# Patient Record
Sex: MALE | Race: BLACK OR AFRICAN AMERICAN | NOT HISPANIC OR LATINO | Employment: FULL TIME | URBAN - METROPOLITAN AREA
[De-identification: names, ages, dates, MRNs, and addresses within clinical notes are randomized per-mention and may not be internally consistent; named-entity substitution may affect disease eponyms.]

---

## 2017-04-03 ENCOUNTER — OFFICE VISIT (OUTPATIENT)
Dept: ORTHOPEDICS | Facility: CLINIC | Age: 47
End: 2017-04-03
Payer: COMMERCIAL

## 2017-04-03 VITALS — HEIGHT: 72 IN | BODY MASS INDEX: 34.54 KG/M2 | WEIGHT: 255 LBS

## 2017-04-03 DIAGNOSIS — S46.102A INJURY OF TENDON OF LONG HEAD OF LEFT BICEPS, INITIAL ENCOUNTER: ICD-10-CM

## 2017-04-03 DIAGNOSIS — S46.212A RUPTURE OF LEFT DISTAL BICEPS TENDON, INITIAL ENCOUNTER: ICD-10-CM

## 2017-04-03 DIAGNOSIS — S46.102A INJURY OF TENDON OF LONG HEAD OF BICEPS, LEFT, INITIAL ENCOUNTER: Primary | ICD-10-CM

## 2017-04-03 PROCEDURE — 99203 OFFICE O/P NEW LOW 30 MIN: CPT | Mod: S$GLB,,, | Performed by: ORTHOPAEDIC SURGERY

## 2017-04-03 PROCEDURE — 1160F RVW MEDS BY RX/DR IN RCRD: CPT | Mod: S$GLB,,, | Performed by: ORTHOPAEDIC SURGERY

## 2017-04-03 PROCEDURE — 99999 PR PBB SHADOW E&M-EST. PATIENT-LVL II: CPT | Mod: PBBFAC,,, | Performed by: ORTHOPAEDIC SURGERY

## 2017-04-03 NOTE — LETTER
April 3, 2017        Shane Mayorga Jr., MD  [address] [facility] [address]             [facility]  [address] [place] [address] [address]  [facility] [address]  Phone: [phone]   Patient: Esa Yang   MR Number: [id number]   YOB: 1970   Date of Visit: 4/3/2017       Dear Dr. Mayorga:    Thank you for referring Esa Yang to me for evaluation. Below are the relevant portions of my assessment and plan of care.            If you have questions, please do not hesitate to call me. I look forward to following Esa along with you.    Sincerely,      Montana Russ Jr., MD           CC  No Recipients

## 2017-04-03 NOTE — MR AVS SNAPSHOT
[facility]  [address] [place] [address] [address]  [place] [address]  Phone: [phone]                  Esa Yang   4/3/2017 9:00 AM   Office Visit    Description:  Male : 1970   Provider:  Montana Russ Jr., MD   Department:  [place] [facility]           Reason for Visit     Left Upper Arm - Pain, Injury           Diagnoses this Visit        Comments    Injury of tendon of long head of biceps, left, initial encounter    -  Primary     Injury of tendon of long head of left biceps, initial encounter         Rupture of left distal biceps tendon, initial encounter                To Do List           Future Appointments        Provider Department Dept Phone    2017 6:00 PM [facility] MRI1 [facility] [phone]    4/10/2017 10:00 AM Montana Russ Jr., MD [facility] [phone]      Goals (5 Years of Data)     None      [facility]     [facility] Nurse Care Line -  Assistance  Unless otherwise directed by your provider, please contact [facility], our nurse care line that is available for  assistance.     Registered nurses in the [facility] provide: appointment scheduling, clinical advisement, health education, and other advisory services.  Call: [phone] (toll free)               Medications           Message regarding Medications     Verify the changes and/or additions to your medication regime listed below are the same as discussed with your clinician today.  If any of these changes or additions are incorrect, please notify your healthcare provider.             Verify that the below list of medications is an accurate representation of the medications you are currently taking.  If none reported, the list may be blank. If incorrect, please contact your healthcare provider. Carry this list with you in case of emergency.           Current Medications     AXIRON 30 mg/actuation (1.5 mL) SlPm     cetirizine (ZYRTEC) 10  MG tablet Take 10 mg by mouth once daily.    ibuprofen (ADVIL,MOTRIN) 600 MG tablet Take 1 tablet (600 mg total) by mouth every 6 (six) hours as needed for Pain.    ranitidine (ZANTAC) 150 MG capsule Take 150 mg by mouth 2 (two) times daily.           Clinical Reference Information           Your Vitals Were     Height Weight BMI          6' (1.829 m) 115.7 kg (255 lb) 34.58 kg/m2        Allergies as of 4/3/2017     Sulfa (Sulfonamide Antibiotics)    Neosporin (Neomycin-polymyx)    Aspirin    Shellfish Containing Products      Immunizations Administered on Date of Encounter - 4/3/2017     None      Orders Placed During Today's Visit     Future Labs/Procedures Expected by Expires    MRI Upper Extremity Joint WO Cont Left  4/3/2017 4/3/2018      [facility] Sign-Up     Activating your [facility] account is as easy as 1-2-3!     1) Visit [email or web address], select Sign Up Now, enter this activation code and your date of birth, then select Next.  [email or web address]  Expires: 5/18/2017  9:29 AM      2) Create a username and password to use when you visit [facility] in the future and select a security question in case you lose your password and select Next.    3) Enter your e-mail address and click Sign Up!    Additional Information  If you have questions, please e-mail [email or web address] or call [phone] to talk to our [facility] staff. Remember, [facility] is NOT to be used for urgent needs. For medical emergencies, dial 911.         Language Assistance Services     ATTENTION: Language assistance services are available, free of charge. Please call [phone].      ATENCIÓN: Si habla español, tiene a glover disposición servicios gratuitos de asistencia lingüística. Llame al [phone].     CHÚ Ý: N?u b?n nói Ti?ng Vi?t, có các d?ch v? h? tr? ngôn ng? mi?n phí dành cho b?n. G?i s? [phone].         [facility] complies with applicable Federal civil rights laws and does not discriminate on the basis of race,  color, national origin, age, disability, or sex.

## 2017-04-03 NOTE — PROGRESS NOTES
INITIAL VISIT HISTORY:  A 47-year-old male sustained injury to his left arm when   he fell off a ladder, two days ago; seen at Urgent Care, and noted to have a   possible biceps rupture of the left arm.  He has had a lot of bruising and   swelling over the weekend.  He has been using ice.  Some pain reported, but   weakness mainly, left arm.    PAST MEDICAL HISTORY:  Significant for back pain and GERD.    PAST SURGICAL HISTORY:  Includes ankle surgery, knee surgery, and back surgery   recently.    FAMILY HISTORY:  Negative.    SOCIAL HISTORY:  The patient does not smoke.  Drinks occasionally.    REVIEW OF SYSTEMS:  Negative fever, chills, rashes.    CURRENT MEDICATIONS:  Reviewed on chart.    ALLERGIES:  Sulfa, Neosporin, aspirin, and shellfish.    PHYSICAL EXAMINATION:  GENERAL:  Well-developed, well-nourished, muscular male in no acute distress,   alert, oriented x3.  HEENT:  Unremarkable.  LUNGS:  Clear to auscultation.  HEART:  Regular rate and rhythm.  ABDOMEN:  Soft, nontender.  EXTREMITIES:  Significant for the left arm, demonstrating bruising and swelling   to left elbow anteriorly and medially.  He has full range of motion of elbow,   with some weakness of the biceps.  The biceps tendon itself is not really   palpable, but I do not feel anything ruptured either.    IMAGING:  No x-rays.    IMPRESSION:  Possible biceps tendon rupture, distal left elbow.    PLAN:  I explained the nature of the injury to the patient.  I think we do need   to get an MRI as soon as possible to check the biceps tendon.  We will set this   up this week.  In the meantime, continue ice, light activities, no heavy   lifting, and follow up in one week for recheck.      MARIELLA  dd: 04/03/2017 09:29:35 (CDT)  td: 04/03/2017 23:56:36 (CDT)  Doc ID   #[id number]  Job ID #[id number]    CC:

## 2017-04-05 ENCOUNTER — HOSPITAL ENCOUNTER (OUTPATIENT)
Dept: RADIOLOGY | Facility: HOSPITAL | Age: 47
Discharge: HOME | End: 2017-04-05
Attending: ORTHOPAEDIC SURGERY
Payer: COMMERCIAL

## 2017-04-05 DIAGNOSIS — S46.102A INJURY OF TENDON OF LONG HEAD OF BICEPS, LEFT, INITIAL ENCOUNTER: ICD-10-CM

## 2017-04-05 PROCEDURE — 73221 MRI JOINT UPR EXTREM W/O DYE: CPT | Mod: 26,LT,, | Performed by: RADIOLOGY

## 2017-04-05 PROCEDURE — 73221 MRI JOINT UPR EXTREM W/O DYE: CPT | Mod: TC,LT

## 2017-04-10 ENCOUNTER — OFFICE VISIT (OUTPATIENT)
Dept: ORTHOPEDICS | Facility: CLINIC | Age: 47
End: 2017-04-10
Payer: COMMERCIAL

## 2017-04-10 ENCOUNTER — TELEPHONE (OUTPATIENT)
Dept: ORTHOPEDICS | Facility: CLINIC | Age: 47
End: 2017-04-10

## 2017-04-10 DIAGNOSIS — S46.212D RUPTURE OF LEFT DISTAL BICEPS TENDON, SUBSEQUENT ENCOUNTER: Primary | ICD-10-CM

## 2017-04-10 PROCEDURE — 99214 OFFICE O/P EST MOD 30 MIN: CPT | Mod: 57,S$GLB,, | Performed by: ORTHOPAEDIC SURGERY

## 2017-04-10 PROCEDURE — 99999 PR PBB SHADOW E&M-EST. PATIENT-LVL III: CPT | Mod: PBBFAC,,, | Performed by: ORTHOPAEDIC SURGERY

## 2017-04-10 PROCEDURE — 1160F RVW MEDS BY RX/DR IN RCRD: CPT | Mod: S$GLB,,, | Performed by: ORTHOPAEDIC SURGERY

## 2017-04-10 NOTE — TELEPHONE ENCOUNTER
I spoke with the patient and informed him that we needed him at the hospital for 9:30am on Wednesday. He understood.

## 2017-04-10 NOTE — PROGRESS NOTES
CHIEF COMPLAINT:  Biceps tendon rupture, left elbow.    HISTORY OF PRESENT ILLNESS:  A 47-year-old male sustained injury to his left   elbow a week ago, injured his left arm.  Recent MRI shows a complete tear of the   distal biceps tendon, left elbow with some retraction.  I explained the nature   of the injury to the patient, recommended and set up surgery for later this week   at [facility].    PAST MEDICAL HISTORY:  Significant for back pain and GERD.    PAST SURGICAL HISTORY:  Includes knee surgery and ankle surgery.    FAMILY HISTORY:  Unremarkable.    SOCIAL HISTORY:  The patient does not smoke.  Drinks occasionally.    REVIEW OF SYSTEMS:  Negative fever, chills, rashes.    CURRENT MEDICATIONS:  Reviewed on the chart.    ALLERGIES:  Sulfa, Neosporin, aspirin and shellfish.    PHYSICAL EXAMINATION:  GENERAL:  Well-developed, muscular male in no acute distress, alert and oriented   x3.  HEENT:  Unremarkable.  LUNGS:  Clear to auscultation.  HEART:  Regular rate and rhythm.  ABDOMEN:  Soft, nontender.  EXTREMITIES:  Significant for the left elbow demonstrating tenderness   anteriorly, mild swelling.  Range of motion full.  Weakness, biceps.  NEUROLOGIC:  Intact.    MRI demonstrates complete tear of the biceps, distal.    IMPRESSION:  Distal biceps tendon tear, left elbow.    PLAN:  We will set up surgery for later this week for biceps repair to bone.    Risks and benefits of surgery explained to the patient as well as the   possibility of nerve injury, he understands.      MARIELLA  dd: 04/10/2017 10:33:31 (CDT)  td: 04/11/2017 08:11:09 (CDT)  Doc ID   #[id number]  Job ID #[id number]    CC:

## 2017-04-11 ENCOUNTER — TELEPHONE (OUTPATIENT)
Dept: ORTHOPEDICS | Facility: CLINIC | Age: 47
End: 2017-04-11

## 2017-04-11 ENCOUNTER — HOSPITAL ENCOUNTER (OUTPATIENT)
Dept: PREADMISSION TESTING | Facility: OTHER | Age: 47
Discharge: HOME | End: 2017-04-11
Attending: ORTHOPAEDIC SURGERY
Payer: COMMERCIAL

## 2017-04-11 ENCOUNTER — ANESTHESIA EVENT (OUTPATIENT)
Dept: SURGERY | Facility: OTHER | Age: 47
End: 2017-04-11
Payer: COMMERCIAL

## 2017-04-11 VITALS
HEIGHT: 72 IN | DIASTOLIC BLOOD PRESSURE: 88 MMHG | BODY MASS INDEX: 34.54 KG/M2 | OXYGEN SATURATION: 100 % | WEIGHT: 255 LBS | SYSTOLIC BLOOD PRESSURE: 139 MMHG | HEART RATE: 72 BPM | TEMPERATURE: 98 F

## 2017-04-11 RX ORDER — MIDAZOLAM HYDROCHLORIDE 5 MG/ML
5 INJECTION INTRAMUSCULAR; INTRAVENOUS
Status: DISPENSED | OUTPATIENT
Start: 2017-04-11 | End: 2017-04-11

## 2017-04-11 RX ORDER — PREGABALIN 75 MG/1
150 CAPSULE ORAL
Status: DISPENSED | OUTPATIENT
Start: 2017-04-11 | End: 2017-04-11

## 2017-04-11 RX ORDER — SODIUM CHLORIDE, SODIUM LACTATE, POTASSIUM CHLORIDE, CALCIUM CHLORIDE 600; 310; 30; 20 MG/100ML; MG/100ML; MG/100ML; MG/100ML
INJECTION, SOLUTION INTRAVENOUS CONTINUOUS
Status: CANCELLED | OUTPATIENT
Start: 2017-04-11

## 2017-04-11 RX ORDER — OMEPRAZOLE 40 MG/1
40 CAPSULE, DELAYED RELEASE ORAL DAILY
COMMUNITY
End: 2021-10-18

## 2017-04-11 RX ORDER — FAMOTIDINE 20 MG/1
20 TABLET, FILM COATED ORAL
Status: CANCELLED | OUTPATIENT
Start: 2017-04-11 | End: 2017-04-11

## 2017-04-11 NOTE — DISCHARGE INSTRUCTIONS
PRE-ADMIT TESTING -  [phone]    [address]  [facility]        OUTPATIENT SURGERY UNIT - [phone]    Your surgery has been scheduled at [facility]. We are pleased to have the opportunity to serve you. For Further Information please call [phone].    On the day of surgery please report to the Information Desk on the 1st floor.    CONTACT YOUR PHYSICIAN'S OFFICE THE DAY PRIOR TO YOUR SURGERY TO OBTAIN YOUR ARRIVAL TIME.     The evening before surgery do not eat anything after 9 p.m. ( this includes hard candy, chewing gum and mints).  You may have GATORADE, POWERADE AND WATER FROM 9 p.m. until leaving home to come to the hospital.   DO NOT DRINK ANY LIQUIDS ON THE WAY TO THE HOSPITAL.     SPECIAL MEDICATION INSTRUCTIONS: TAKE medications checked off by the Anesthesiologist on your Medication List.    Angiogram Patients: Take medications as instructed by your physician, including aspirin.     Surgery Patients:    If you take ASPIRIN - Your PHYSICIAN/SURGEON will need to inform you IF/OR when you need to stop taking aspirin prior to your surgery.     Do Not take any medications containing IBUPROFEN.  Do Not Wear any make-up or dark nail polish   (especially eye make-up) to surgery. If you come to surgery with makeup on you will be required to remove the makeup or nail polish.    Do not shave your surgical area at least 5 days prior to your surgery. The surgical prep will be performed at the hospital according to Infection Control regulations.    Leave all valuables at home.   Do Not wear any jewelry or watches, including any metal in body piercings.  Contact Lens must be removed before surgery. Either do not wear the contact lens or bring a case and solution for storage.  Please bring a container for eyeglasses or dentures as required.  Bring any paperwork your physician has provided, such as consent forms,  history and physicals, doctor's orders, etc.   Bring comfortable  clothes that are loose fitting to wear upon discharge. Take into consideration the type of surgery being performed.  Maintain your diet as advised per your physician the day prior to surgery.      Adequate rest the night before surgery is advised.   Park in the Parking lot behind the hospital or in the [facility] across the street from the parking lot. Parking is complimentary.  If you will be discharged the same day as your procedure, please arrange for a responsible adult to drive you home or to accompany you if traveling by taxi.   YOU WILL NOT BE PERMITTED TO DRIVE OR TO LEAVE THE HOSPITAL ALONE AFTER SURGERY.   It is strongly recommended that you arrange for someone to remain with you for the first 24 hrs following your surgery.       Thank you for your cooperation.  The Staff of [facility].        Bathing Instructions                                                                 Please shower the evening before and morning of your procedure with    ANTIBACTERIAL SOAP. ( DIAL, etc )  Concentrate on the surgical area   for at least 3 minutes and rinse completely. Dry off as usual.   Do not use any deodorant, powder, body lotions, perfume, after shave or    cologne.

## 2017-04-11 NOTE — IP AVS SNAPSHOT
[facility] Location ([facility])  [address] [place], [place] [address]  Phone: [phone]           Patient Discharge Instructions  Our goal is to set you up for success. This packet includes information on your condition, medications, and your home care. It will help you care for yourself to prevent having to return to the hospital.     Please ask your nurse if you have any questions.      There are many details to remember when preparing for your surgery. Here is what you will need to do, please ask your nurse if there are more specific instructions and if you have any questions:    1. Before procedure Do not smoke or drink alcoholic beverages 24 hours prior to your procedure. Do not eat or drink anything 8 hours before your procedure - this includes gum, mints, and candy.     2. Day of procedure Please remove all jewelry for the procedure. If you wear contact lenses, dentures, hearing aids or glasses, bring a container to put them in during your surgery and give to a family member.  If your doctor has scheduled you for an overnight stay, bring a small overnight bag with any personal items that you need.      3. After procedure  Make arrangements in advance for transportation home by a responsible adult. It is not safe to drive a vehicle during the 24 hours following surgery.     PLEASE NOTE: You may be contacted the day before your surgery to confirm your surgery date and arrival time. The Surgery schedule has many variables which may affect the time of your surgery case. Family members should be available if your surgery time changes.           [facility]  Unless otherwise directed by your provider, please   contact [facility], our nurse care line   that is available for 24/7 assistance.     [phone] (toll-free)     Registered nurses in the [facility]   provide: appointment scheduling, clinical advisement, health education, and other advisory services.                  **  Verify the list of medication(s) below is accurate and up to date. Carry this with you in case of emergency. If your medications have changed, please notify your healthcare provider.             Medication List      TAKE these medications        Additional Info                      omeprazole 40 MG capsule   Commonly known as:  PRILOSEC   Refills:  0   Dose:  40 mg    Instructions:  Take 40 mg by mouth once daily.     Begin Date    AM    Noon    PM    Bedtime                  Please bring to all follow up appointments:    1. A copy of your discharge instructions.  2. All medicines you are currently taking in their original bottles.  3. Identification and insurance card.    Please arrive 15 minutes ahead of scheduled appointment time.    Please call 24 hours in advance if you must reschedule your appointment and/or time.        Your Future Surgeries/Procedures     Apr 12, 2017   Surgery with Montana Russ Jr., MD   [facility] ([facility]    [address] [place] [address]   [phone]                  Discharge Instructions       PRE-ADMIT TESTING -  [phone]    [address] [place] [address]        OUTPATIENT SURGERY UNIT - [phone]    Your surgery has been scheduled at [facility]. We are pleased to have the opportunity to serve you. For Further Information please call [phone].    On the day of surgery please report to the Information Desk on the 1st floor.    CONTACT YOUR PHYSICIAN'S OFFICE THE DAY PRIOR TO YOUR SURGERY TO OBTAIN YOUR ARRIVAL TIME.     The evening before surgery do not eat anything after 9 p.m. ( this includes hard candy, chewing gum and mints).  You may have GATORADE, POWERADE AND WATER FROM 9 p.m. until leaving home to come to the hospital.   DO NOT DRINK ANY LIQUIDS ON THE WAY TO THE HOSPITAL.     SPECIAL MEDICATION INSTRUCTIONS: TAKE medications checked off by the Anesthesiologist on your Medication  List.    Angiogram Patients: Take medications as instructed by your physician, including aspirin.     Surgery Patients:    If you take ASPIRIN - Your PHYSICIAN/SURGEON will need to inform you IF/OR when you need to stop taking aspirin prior to your surgery.     Do Not take any medications containing IBUPROFEN.  Do Not Wear any make-up or dark nail polish   (especially eye make-up) to surgery. If you come to surgery with makeup on you will be required to remove the makeup or nail polish.    Do not shave your surgical area at least 5 days prior to your surgery. The surgical prep will be performed at the hospital according to Infection Control regulations.    Leave all valuables at home.   Do Not wear any jewelry or watches, including any metal in body piercings.  Contact Lens must be removed before surgery. Either do not wear the contact lens or bring a case and solution for storage.  Please bring a container for eyeglasses or dentures as required.  Bring any paperwork your physician has provided, such as consent forms,  history and physicals, doctor's orders, etc.   Bring comfortable clothes that are loose fitting to wear upon discharge. Take into consideration the type of surgery being performed.  Maintain your diet as advised per your physician the day prior to surgery.      Adequate rest the night before surgery is advised.   Park in the Parking lot behind the hospital or in the [place] [facility] across the street from the parking lot. Parking is complimentary.  If you will be discharged the same day as your procedure, please arrange for a responsible adult to drive you home or to accompany you if traveling by taxi.   YOU WILL NOT BE PERMITTED TO DRIVE OR TO LEAVE THE HOSPITAL ALONE AFTER SURGERY.   It is strongly recommended that you arrange for someone to remain with you for the first 24 hrs following your surgery.       Thank you for your cooperation.  The Staff of [facility].        Bathing Instructions                                                                 Please shower the evening before and morning of your procedure with    ANTIBACTERIAL SOAP. ( DIAL, etc )  Concentrate on the surgical area   for at least 3 minutes and rinse completely. Dry off as usual.   Do not use any deodorant, powder, body lotions, perfume, after shave or    cologne.                                                Admission Information     Date & Time Provider Department CSN    4/11/2017  8:00 AM Montana Russ Jr., MD [facility] [id number]      Care Providers     Provider Role Specialty Primary office phone    Montana Russ Jr., MD Attending Provider Hand Surgery [phone]      Your Vitals Were     BP Pulse Temp Height Weight SpO2    139/88 72 98.2 °F (36.8 °C) (Oral) 6' (1.829 m) 115.7 kg (255 lb) 100%    BMI                34.58 kg/m2          Recent Lab Values        11/14/2014                           9:00 AM           A1C 6.2                       Allergies as of 4/11/2017        Reactions    Sulfa (Sulfonamide Antibiotics) Swelling    Neosporin (Neomycin-polymyx) Rash    Topical irritation     Aspirin Swelling    Shellfish Containing Products Swelling      Advance Directives     An advance directive is a document which, in the event you are no longer able to make decisions for yourself, tells your healthcare team what kind of treatment you do or do not want to receive, or who you would like to make those decisions for you.  If you do not currently have an advance directive, [facility] encourages you to create one.  For more information call:  [phone]), [phone] ([phone]),  or log on to [email or web address].        Language Assistance Services     ATTENTION: Language assistance services are available, free of charge. Please call [phone].      ATENCIÓN: Si habla español, tiene a glover disposición servicios gratuitos de asistencia  apula. Cindy dillon [phone].     PAUL Ý: N?u b?n nói Ti?ng Vi?t, có các d?ch v? h? tr? ngôn ng? mi?n phí dành cho b?n. G?i s? [phone].        [facility] Sign-Up     Activating your [facility] account is as easy as 1-2-3!     1) Visit [email or web address], select Sign Up Now, enter this activation code and your date of birth, then select Next.  [email or web address]  Expires: 5/18/2017  9:29 AM      2) Create a username and password to use when you visit [facility] in the future and select a security question in case you lose your password and select Next.    3) Enter your e-mail address and click Sign Up!    Additional Information  If you have questions, please e-mail [email or web address] or call [phone] to talk to our [facility] staff. Remember, [facility] is NOT to be used for urgent needs. For medical emergencies, dial 911.          [facility] complies with applicable Federal civil rights laws and does not discriminate on the basis of race, color, national origin, age, disability, or sex.

## 2017-04-11 NOTE — ANESTHESIA PREPROCEDURE EVALUATION
04/11/2017  Esa Yang is a 47 y.o., male.    [facility] Anesthesia Evaluation    I have reviewed the Patient Summary Reports.    I have reviewed the Nursing Notes.   I have reviewed the Medications.     Review of Systems  Anesthesia Hx:  No problems with previous Anesthesia    Social:  Non-Smoker, No Alcohol Use    Hematology/Oncology:  Hematology Normal   Oncology Normal     Cardiovascular:  Cardiovascular Normal     Pulmonary:  Pulmonary Normal    Renal/:  Renal/ Normal     Hepatic/GI:   GERD, well controlled    Musculoskeletal:   Discectomies L3 and 5 with hardware.   Doing well with good pain relief Spine Disorders: lumbar Disc disease    Neurological:  Neurology Normal    Endocrine:  Endocrine Normal        Physical Exam  General:  Obesity    Airway/Jaw/Neck:  Airway Findings: Mouth Opening: Normal Tongue: Normal  General Airway Assessment: Adult, Average  Mallampati: II  TM Distance: 4 - 6 cm  Jaw/Neck Findings:  Neck ROM: Normal ROM      Dental:  Dental Findings: In tact, upper front caps             Anesthesia Plan  Type of Anesthesia, risks & benefits discussed:  Anesthesia Type:  general  Patient's Preference:   Intra-op Monitoring Plan:   Intra-op Monitoring Plan Comments:   Post Op Pain Control Plan:   Post Op Pain Control Plan Comments:   Induction:   IV  Beta Blocker:         Informed Consent: Patient understands risks and agrees with Anesthesia plan.  Questions answered. Anesthesia consent signed with patient.  ASA Score: 2     Day of Surgery Review of History & Physical:    H&P update referred to the surgeon.         Ready For Surgery From Anesthesia Perspective.

## 2017-04-12 ENCOUNTER — SURGERY (OUTPATIENT)
Age: 47
End: 2017-04-12

## 2017-04-12 ENCOUNTER — ANESTHESIA (OUTPATIENT)
Dept: SURGERY | Facility: OTHER | Age: 47
End: 2017-04-12
Payer: COMMERCIAL

## 2017-04-12 ENCOUNTER — HOSPITAL ENCOUNTER (OUTPATIENT)
Facility: OTHER | Age: 47
Discharge: HOME | End: 2017-04-12
Attending: ORTHOPAEDIC SURGERY | Admitting: ORTHOPAEDIC SURGERY
Payer: COMMERCIAL

## 2017-04-12 VITALS
DIASTOLIC BLOOD PRESSURE: 97 MMHG | HEIGHT: 72 IN | OXYGEN SATURATION: 100 % | RESPIRATION RATE: 18 BRPM | HEART RATE: 75 BPM | TEMPERATURE: 98 F | SYSTOLIC BLOOD PRESSURE: 164 MMHG | BODY MASS INDEX: 34.54 KG/M2 | WEIGHT: 255 LBS

## 2017-04-12 DIAGNOSIS — S46.212D RUPTURE OF LEFT DISTAL BICEPS TENDON, SUBSEQUENT ENCOUNTER: ICD-10-CM

## 2017-04-12 PROCEDURE — 63600175 PHARM REV CODE 636 W HCPCS: Performed by: SPECIALIST

## 2017-04-12 PROCEDURE — 63600175 PHARM REV CODE 636 W HCPCS: Performed by: ORTHOPAEDIC SURGERY

## 2017-04-12 PROCEDURE — 71000033 HC RECOVERY, INTIAL HOUR: Performed by: ORTHOPAEDIC SURGERY

## 2017-04-12 PROCEDURE — 27201423 OPTIME MED/SURG SUP & DEVICES STERILE SUPPLY: Performed by: ORTHOPAEDIC SURGERY

## 2017-04-12 PROCEDURE — 37000008 HC ANESTHESIA 1ST 15 MINUTES: Performed by: ORTHOPAEDIC SURGERY

## 2017-04-12 PROCEDURE — 25000003 PHARM REV CODE 250: Performed by: ANESTHESIOLOGY

## 2017-04-12 PROCEDURE — 36000708 HC OR TIME LEV III 1ST 15 MIN: Performed by: ORTHOPAEDIC SURGERY

## 2017-04-12 PROCEDURE — 24340 TENODESIS BICEPS TDN AT ELBW: CPT | Mod: LT,,, | Performed by: ORTHOPAEDIC SURGERY

## 2017-04-12 PROCEDURE — 25000242 PHARM REV CODE 250 ALT 637 W/ HCPCS: Performed by: NURSE ANESTHETIST, CERTIFIED REGISTERED

## 2017-04-12 PROCEDURE — 25000003 PHARM REV CODE 250: Performed by: ORTHOPAEDIC SURGERY

## 2017-04-12 PROCEDURE — 71000015 HC POSTOP RECOV 1ST HR: Performed by: ORTHOPAEDIC SURGERY

## 2017-04-12 PROCEDURE — 71000016 HC POSTOP RECOV ADDL HR: Performed by: ORTHOPAEDIC SURGERY

## 2017-04-12 PROCEDURE — 63600175 PHARM REV CODE 636 W HCPCS: Performed by: NURSE ANESTHETIST, CERTIFIED REGISTERED

## 2017-04-12 PROCEDURE — 25000003 PHARM REV CODE 250: Performed by: SPECIALIST

## 2017-04-12 PROCEDURE — 36000709 HC OR TIME LEV III EA ADD 15 MIN: Performed by: ORTHOPAEDIC SURGERY

## 2017-04-12 PROCEDURE — 71000039 HC RECOVERY, EACH ADD'L HOUR: Performed by: ORTHOPAEDIC SURGERY

## 2017-04-12 PROCEDURE — C1713 ANCHOR/SCREW BN/BN,TIS/BN: HCPCS | Performed by: ORTHOPAEDIC SURGERY

## 2017-04-12 PROCEDURE — 25000003 PHARM REV CODE 250: Performed by: NURSE ANESTHETIST, CERTIFIED REGISTERED

## 2017-04-12 PROCEDURE — 37000009 HC ANESTHESIA EA ADD 15 MINS: Performed by: ORTHOPAEDIC SURGERY

## 2017-04-12 DEVICE — BUTTON BICEPS DISTAL STERILE: Type: IMPLANTABLE DEVICE | Site: ARM | Status: FUNCTIONAL

## 2017-04-12 RX ORDER — DIPHENHYDRAMINE HYDROCHLORIDE 50 MG/ML
25 INJECTION INTRAMUSCULAR; INTRAVENOUS EVERY 6 HOURS PRN
Status: DISCONTINUED | OUTPATIENT
Start: 2017-04-12 | End: 2017-04-12 | Stop reason: HOSPADM

## 2017-04-12 RX ORDER — OXYCODONE AND ACETAMINOPHEN 10; 325 MG/1; MG/1
1 TABLET ORAL EVERY 4 HOURS PRN
Qty: 40 TABLET | Refills: 0 | Status: SHIPPED | OUTPATIENT
Start: 2017-04-12 | End: 2017-04-24

## 2017-04-12 RX ORDER — FAMOTIDINE 20 MG/1
20 TABLET, FILM COATED ORAL
Status: COMPLETED | OUTPATIENT
Start: 2017-04-12 | End: 2017-04-12

## 2017-04-12 RX ORDER — PROPOFOL 10 MG/ML
VIAL (ML) INTRAVENOUS
Status: DISCONTINUED | OUTPATIENT
Start: 2017-04-12 | End: 2017-04-12

## 2017-04-12 RX ORDER — OXYCODONE HYDROCHLORIDE 5 MG/1
5 TABLET ORAL
Status: DISCONTINUED | OUTPATIENT
Start: 2017-04-12 | End: 2017-04-12 | Stop reason: HOSPADM

## 2017-04-12 RX ORDER — BUPIVACAINE HYDROCHLORIDE 2.5 MG/ML
INJECTION, SOLUTION EPIDURAL; INFILTRATION; INTRACAUDAL
Status: DISCONTINUED | OUTPATIENT
Start: 2017-04-12 | End: 2017-04-12 | Stop reason: HOSPADM

## 2017-04-12 RX ORDER — HYDRALAZINE HYDROCHLORIDE 20 MG/ML
10 INJECTION INTRAMUSCULAR; INTRAVENOUS ONCE
Status: COMPLETED | OUTPATIENT
Start: 2017-04-12 | End: 2017-04-12

## 2017-04-12 RX ORDER — ONDANSETRON 2 MG/ML
4 INJECTION INTRAMUSCULAR; INTRAVENOUS ONCE AS NEEDED
Status: COMPLETED | OUTPATIENT
Start: 2017-04-12 | End: 2017-04-12

## 2017-04-12 RX ORDER — ONDANSETRON 8 MG/1
8 TABLET, ORALLY DISINTEGRATING ORAL EVERY 8 HOURS PRN
Status: DISCONTINUED | OUTPATIENT
Start: 2017-04-12 | End: 2017-04-12 | Stop reason: HOSPADM

## 2017-04-12 RX ORDER — OXYCODONE HYDROCHLORIDE 5 MG/1
10 TABLET ORAL EVERY 4 HOURS PRN
Status: DISCONTINUED | OUTPATIENT
Start: 2017-04-12 | End: 2017-04-12 | Stop reason: HOSPADM

## 2017-04-12 RX ORDER — MEPERIDINE HYDROCHLORIDE 50 MG/ML
12.5 INJECTION INTRAMUSCULAR; INTRAVENOUS; SUBCUTANEOUS ONCE AS NEEDED
Status: DISCONTINUED | OUTPATIENT
Start: 2017-04-12 | End: 2017-04-12 | Stop reason: HOSPADM

## 2017-04-12 RX ORDER — HYDROMORPHONE HYDROCHLORIDE 2 MG/ML
0.4 INJECTION, SOLUTION INTRAMUSCULAR; INTRAVENOUS; SUBCUTANEOUS EVERY 5 MIN PRN
Status: DISCONTINUED | OUTPATIENT
Start: 2017-04-12 | End: 2017-04-12 | Stop reason: HOSPADM

## 2017-04-12 RX ORDER — SODIUM CHLORIDE, SODIUM LACTATE, POTASSIUM CHLORIDE, CALCIUM CHLORIDE 600; 310; 30; 20 MG/100ML; MG/100ML; MG/100ML; MG/100ML
INJECTION, SOLUTION INTRAVENOUS CONTINUOUS
Status: DISCONTINUED | OUTPATIENT
Start: 2017-04-12 | End: 2017-04-12 | Stop reason: HOSPADM

## 2017-04-12 RX ORDER — ACETAMINOPHEN 325 MG/1
650 TABLET ORAL EVERY 4 HOURS PRN
Status: DISCONTINUED | OUTPATIENT
Start: 2017-04-12 | End: 2017-04-12 | Stop reason: HOSPADM

## 2017-04-12 RX ORDER — ALBUTEROL SULFATE 90 UG/1
AEROSOL, METERED RESPIRATORY (INHALATION)
Status: DISCONTINUED | OUTPATIENT
Start: 2017-04-12 | End: 2017-04-12

## 2017-04-12 RX ORDER — FENTANYL CITRATE 50 UG/ML
100 INJECTION, SOLUTION INTRAMUSCULAR; INTRAVENOUS EVERY 5 MIN PRN
Status: COMPLETED | OUTPATIENT
Start: 2017-04-12 | End: 2017-04-12

## 2017-04-12 RX ORDER — LIDOCAINE HCL/PF 100 MG/5ML
SYRINGE (ML) INTRAVENOUS
Status: DISCONTINUED | OUTPATIENT
Start: 2017-04-12 | End: 2017-04-12

## 2017-04-12 RX ORDER — PHENYLEPHRINE HYDROCHLORIDE 10 MG/ML
INJECTION INTRAVENOUS
Status: DISCONTINUED | OUTPATIENT
Start: 2017-04-12 | End: 2017-04-12

## 2017-04-12 RX ORDER — HYDROCODONE BITARTRATE AND ACETAMINOPHEN 5; 325 MG/1; MG/1
1 TABLET ORAL EVERY 4 HOURS PRN
Status: DISCONTINUED | OUTPATIENT
Start: 2017-04-12 | End: 2017-04-12 | Stop reason: HOSPADM

## 2017-04-12 RX ORDER — CEFAZOLIN SODIUM 2 G/50ML
2 SOLUTION INTRAVENOUS
Status: DISCONTINUED | OUTPATIENT
Start: 2017-04-12 | End: 2017-04-12 | Stop reason: HOSPADM

## 2017-04-12 RX ORDER — FENTANYL CITRATE 50 UG/ML
25 INJECTION, SOLUTION INTRAMUSCULAR; INTRAVENOUS EVERY 5 MIN PRN
Status: DISCONTINUED | OUTPATIENT
Start: 2017-04-12 | End: 2017-04-12 | Stop reason: HOSPADM

## 2017-04-12 RX ORDER — SODIUM CHLORIDE 0.9 % (FLUSH) 0.9 %
3 SYRINGE (ML) INJECTION
Status: DISCONTINUED | OUTPATIENT
Start: 2017-04-12 | End: 2017-04-12 | Stop reason: HOSPADM

## 2017-04-12 RX ORDER — PROMETHAZINE HYDROCHLORIDE 25 MG/1
25 TABLET ORAL EVERY 4 HOURS PRN
Qty: 30 TABLET | Refills: 0 | Status: SHIPPED | OUTPATIENT
Start: 2017-04-12 | End: 2017-04-19

## 2017-04-12 RX ORDER — MIDAZOLAM HYDROCHLORIDE 1 MG/ML
2 INJECTION INTRAMUSCULAR; INTRAVENOUS ONCE
Status: COMPLETED | OUTPATIENT
Start: 2017-04-12 | End: 2017-04-12

## 2017-04-12 RX ORDER — SODIUM CHLORIDE 0.9 % (FLUSH) 0.9 %
3 SYRINGE (ML) INJECTION EVERY 8 HOURS
Status: DISCONTINUED | OUTPATIENT
Start: 2017-04-12 | End: 2017-04-12 | Stop reason: HOSPADM

## 2017-04-12 RX ORDER — ONDANSETRON 2 MG/ML
INJECTION INTRAMUSCULAR; INTRAVENOUS
Status: DISCONTINUED | OUTPATIENT
Start: 2017-04-12 | End: 2017-04-12

## 2017-04-12 RX ADMIN — HYDRALAZINE HYDROCHLORIDE 10 MG: 20 INJECTION INTRAMUSCULAR; INTRAVENOUS at 02:04

## 2017-04-12 RX ADMIN — ALBUTEROL SULFATE 5 PUFF: 90 AEROSOL, METERED RESPIRATORY (INHALATION) at 09:04

## 2017-04-12 RX ADMIN — LIDOCAINE HYDROCHLORIDE 100 MG: 20 INJECTION, SOLUTION INTRAVENOUS at 08:04

## 2017-04-12 RX ADMIN — OXYCODONE HYDROCHLORIDE 5 MG: 5 TABLET ORAL at 10:04

## 2017-04-12 RX ADMIN — HYDROMORPHONE HYDROCHLORIDE 0.4 MG: 2 INJECTION, SOLUTION INTRAMUSCULAR; INTRAVENOUS; SUBCUTANEOUS at 10:04

## 2017-04-12 RX ADMIN — FENTANYL CITRATE 100 MCG: 50 INJECTION, SOLUTION INTRAMUSCULAR; INTRAVENOUS at 08:04

## 2017-04-12 RX ADMIN — CEFAZOLIN SODIUM 2 G: 2 SOLUTION INTRAVENOUS at 08:04

## 2017-04-12 RX ADMIN — ONDANSETRON 4 MG: 2 INJECTION INTRAMUSCULAR; INTRAVENOUS at 11:04

## 2017-04-12 RX ADMIN — FENTANYL CITRATE 50 MCG: 50 INJECTION, SOLUTION INTRAMUSCULAR; INTRAVENOUS at 08:04

## 2017-04-12 RX ADMIN — PHENYLEPHRINE HYDROCHLORIDE 100 MCG: 10 INJECTION INTRAVENOUS at 09:04

## 2017-04-12 RX ADMIN — FAMOTIDINE 20 MG: 20 TABLET, FILM COATED ORAL at 07:04

## 2017-04-12 RX ADMIN — BUPIVACAINE HYDROCHLORIDE 15 ML: 2.5 INJECTION, SOLUTION EPIDURAL; INFILTRATION; INTRACAUDAL; PERINEURAL at 09:04

## 2017-04-12 RX ADMIN — ONDANSETRON 8 MG: 8 TABLET, ORALLY DISINTEGRATING ORAL at 02:04

## 2017-04-12 RX ADMIN — HYDROMORPHONE HYDROCHLORIDE 0.4 MG: 2 INJECTION, SOLUTION INTRAMUSCULAR; INTRAVENOUS; SUBCUTANEOUS at 11:04

## 2017-04-12 RX ADMIN — SODIUM CHLORIDE, SODIUM LACTATE, POTASSIUM CHLORIDE, AND CALCIUM CHLORIDE: 600; 310; 30; 20 INJECTION, SOLUTION INTRAVENOUS at 09:04

## 2017-04-12 RX ADMIN — CARBOXYMETHYLCELLULOSE SODIUM 2 DROP: 2.5 SOLUTION/ DROPS OPHTHALMIC at 08:04

## 2017-04-12 RX ADMIN — ONDANSETRON 4 MG: 2 INJECTION INTRAMUSCULAR; INTRAVENOUS at 09:04

## 2017-04-12 RX ADMIN — SODIUM CHLORIDE, SODIUM LACTATE, POTASSIUM CHLORIDE, AND CALCIUM CHLORIDE: 600; 310; 30; 20 INJECTION, SOLUTION INTRAVENOUS at 07:04

## 2017-04-12 RX ADMIN — MIDAZOLAM HYDROCHLORIDE 2 MG: 1 INJECTION, SOLUTION INTRAMUSCULAR; INTRAVENOUS at 07:04

## 2017-04-12 RX ADMIN — PROPOFOL 300 MG: 10 INJECTION, EMULSION INTRAVENOUS at 08:04

## 2017-04-12 NOTE — TRANSFER OF CARE
Anesthesia Transfer of Care Note    Patient: Esa Yang    Procedure(s) Performed: Procedure(s) (LRB):  REPAIR-TENDON-BICEP (Left)    Patient location: PACU    Anesthesia Type: general    Transport from OR: Transported from OR on 2-3 L/min O2 by NC with adequate spontaneous ventilation    Post pain: adequate analgesia    Post assessment: no apparent anesthetic complications and tolerated procedure well    Post vital signs: stable    Level of consciousness: awake, alert and oriented    Nausea/Vomiting: no nausea/vomiting    Complications: none          Last vitals:   Visit Vitals    /85 (BP Location: Right arm, Patient Position: Lying, BP Method: Automatic)    Pulse 71    Temp 36.5 °C (97.7 °F) (Oral)    Resp 18    Ht 6' (1.829 m)    Wt 115.7 kg (255 lb)    SpO2 97%    BMI 34.58 kg/m2

## 2017-04-12 NOTE — OR NURSING
Spoke with Dr Delgadillo, aware that B/P was 159/108 prior to administration of hydralazine and , B/P after was 161/101, order noted to discharge home and have patient follow up with primary physician.

## 2017-04-12 NOTE — ANESTHESIA POSTPROCEDURE EVALUATION
Anesthesia Post Evaluation    Patient: Esa Yang    Procedure(s) Performed: Procedure(s) (LRB):  REPAIR-TENDON-BICEP (Left)    Final Anesthesia Type: general  Patient location during evaluation: PACU  Patient participation: Yes- Able to Participate  Level of consciousness: awake and alert  Post-procedure vital signs: reviewed and stable  Pain management: adequate  Airway patency: patent  PONV status at discharge: No PONV  Anesthetic complications: no      Cardiovascular status: blood pressure returned to baseline  Respiratory status: unassisted, spontaneous ventilation and room air  Hydration status: euvolemic  Follow-up not needed.        Visit Vitals    BP (!) 143/85 (BP Location: Left arm, Patient Position: Lying, BP Method: Automatic)    Pulse 90    Temp 36.5 °C (97.7 °F) (Oral)    Resp 16    Ht 6' (1.829 m)    Wt 115.7 kg (255 lb)    SpO2 100%    BMI 34.58 kg/m2       Pain/Melissa Score: Pain Assessment Performed: Yes (4/12/2017 10:10 AM)  Presence of Pain: non-verbal indicators absent (4/12/2017 10:10 AM)  Pain Rating Prior to Med Admin: 8 (4/12/2017 10:52 AM)  Melissa Score: 8 (4/12/2017 10:10 AM)

## 2017-04-12 NOTE — INTERVAL H&P NOTE
The patient has been examined and the H&P has been reviewed:    I concur with the findings and no changes have occurred since H&P was written.    Anesthesia/Surgery risks, benefits and alternative options discussed and understood by patient/family.          Active Hospital Problems    Diagnosis  POA    Rupture of left distal biceps tendon [S46.212A]  Yes      Resolved Hospital Problems    Diagnosis Date Resolved POA   No resolved problems to display.

## 2017-04-12 NOTE — BRIEF OP NOTE
[facility]  Brief Operative Note     SUMMARY     Surgery Date: 4/12/2017     Surgeon(s) and Role:     * Montana Russ Jr., MD - Primary    Assisting Surgeon: None    Pre-op Diagnosis:  Rupture of left distal biceps tendon, subsequent encounter [S46.212D]    Post-op Diagnosis:  Post-Op Diagnosis Codes:     * Rupture of left distal biceps tendon, subsequent encounter [S46.212D]    Procedure(s) (LRB):  REPAIR-TENDON-BICEP (Left)    Anesthesia: General    Description of the findings of the procedure: biceps tendon rupture left    Findings/Key Components: biceps repair left elbow    Estimated Blood Loss: * No values recorded between 4/12/2017  8:31 AM and 4/12/2017 10:12 AM *         Specimens:   Specimen     None          Discharge Note    SUMMARY     Admit Date: 4/12/2017    Discharge Date and Time:  04/12/2017 10:12 AM    Hospital Course (synopsis of major diagnoses, care, treatment, and services provided during the course of the hospital stay): see op note     Final Diagnosis: Post-Op Diagnosis Codes:     * Rupture of left distal biceps tendon, subsequent encounter [S46.212D]    Disposition: Home or Self Care    Follow Up/Patient Instructions:     Medications:  Reconciled Home Medications:   Current Discharge Medication List      START taking these medications    Details   oxycodone-acetaminophen (PERCOCET)  mg per tablet Take 1 tablet by mouth every 4 (four) hours as needed for Pain.  Qty: 40 tablet, Refills: 0         CONTINUE these medications which have NOT CHANGED    Details   omeprazole (PRILOSEC) 40 MG capsule Take 40 mg by mouth once daily.             Discharge Procedure Orders  Diet general     Shower on day dressing removed (No bath)     Call MD for:  temperature >100.4     Call MD for:  persistent nausea and vomiting     Call MD for:  severe uncontrolled pain     Leave dressing on - Keep it clean, dry, and intact until clinic visit       Follow-up Information     Follow up  with Montana Russ Jr, MD. Schedule an appointment as soon as possible for a visit in 8 days.    Specialties:  Hand Surgery, Orthopedic Surgery    Why:  For wound re-check    Contact information:    [address]  [address]  [place] [address] [phone]

## 2017-04-12 NOTE — H&P (VIEW-ONLY)
CHIEF COMPLAINT:  Biceps tendon rupture, left elbow.    HISTORY OF PRESENT ILLNESS:  A 47-year-old male sustained injury to his left   elbow a week ago, injured his left arm.  Recent MRI shows a complete tear of the   distal biceps tendon, left elbow with some retraction.  I explained the nature   of the injury to the patient, recommended and set up surgery for later this week   at [facility].    PAST MEDICAL HISTORY:  Significant for back pain and GERD.    PAST SURGICAL HISTORY:  Includes knee surgery and ankle surgery.    FAMILY HISTORY:  Unremarkable.    SOCIAL HISTORY:  The patient does not smoke.  Drinks occasionally.    REVIEW OF SYSTEMS:  Negative fever, chills, rashes.    CURRENT MEDICATIONS:  Reviewed on the chart.    ALLERGIES:  Sulfa, Neosporin, aspirin and shellfish.    PHYSICAL EXAMINATION:  GENERAL:  Well-developed, muscular male in no acute distress, alert and oriented   x3.  HEENT:  Unremarkable.  LUNGS:  Clear to auscultation.  HEART:  Regular rate and rhythm.  ABDOMEN:  Soft, nontender.  EXTREMITIES:  Significant for the left elbow demonstrating tenderness   anteriorly, mild swelling.  Range of motion full.  Weakness, biceps.  NEUROLOGIC:  Intact.    MRI demonstrates complete tear of the biceps, distal.    IMPRESSION:  Distal biceps tendon tear, left elbow.    PLAN:  We will set up surgery for later this week for biceps repair to bone.    Risks and benefits of surgery explained to the patient as well as the   possibility of nerve injury, he understands.      MARIELLA  dd: 04/10/2017 10:33:31 (CDT)  td: 04/11/2017 08:11:09 (CDT)  Doc ID   #[id number]  Job ID #[id number]    CC:

## 2017-04-12 NOTE — DISCHARGE INSTRUCTIONS
Anesthesia: After Your Surgery  Youve just had surgery. During surgery, you received medication called anesthesia to keep you comfortable and pain-free. After surgery, you may experience some pain or nausea. This is common. Here are some tips for feeling better and recovering after surgery.    Going home  Your doctor or nurse will show you how to take care of yourself when you go home. He or she will also answer your questions. Have an adult family member or friend drive you home. For the first 24 hours after your surgery:  · Do not drive or use heavy equipment.  · Do not make important decisions or sign legal documents.  · Avoid alcohol.  · Have someone stay with you, if needed. He or she can watch for problems and help keep you safe.  Be sure to keep all follow-up appointments with your doctor. And rest after your procedure for as long as your doctor tells you to.    Coping with pain  If you have pain after surgery, pain medication will help you feel better. Take it as directed, before pain becomes severe. Also, ask your doctor or pharmacist about other ways to control pain, such as with heat, ice, and relaxation. And follow any other instructions your surgeon or nurse gives you.    Tips for taking pain medication  To get the best relief possible, remember these points:  · Pain medications can upset your stomach. Taking them with a little food may help.  · Most pain relievers taken by mouth need at least 20 to 30 minutes to take effect.  · Taking medication on a schedule can help you remember to take it. Try to time your medication so that you can take it before beginning an activity, such as dressing, walking, or sitting down for dinner.  · Constipation is a common side effect of pain medications. Contact your doctor before taking any medications like laxatives or stool softeners to help relieve constipation. Also ask about any dietary restrictions, because drinking lots of fluids and eating foods like fruits  and vegetables that are high in fiber can also help. Remember, dont take laxatives unless your surgeon has prescribed them.  · Mixing alcohol and pain medication can cause dizziness and slow your breathing. It can even be fatal. Dont drink alcohol while taking pain medication.  · Pain medication can slow your reflexes. Dont drive or operate machinery while taking pain medication.  If your health care provider tells you to take acetaminophen to help relieve your pain, ask him or her how much you are supposed to take each day. (Acetaminophen is the generic name for Tylenol and other brand-name pain relievers.) Acetaminophen or other pain relievers may interact with your prescription medicines or other over-the-counter (OTC) drugs. Some prescription medications contain acetaminophen along with other active ingredients. Using both prescription and OTC acetaminophen for pain can cause you to overdose. The FDA recommends that you read the labels on your OTC medications carefully. This will help you to clearly understand the list of active ingredients, dosing instructions, and any warnings. It may also help you avoid taking too much acetaminophen. If you have questions or don't understand the information, ask your pharmacist or health care provider to explain it to you before you take the OTC medication.    Managing nausea  Some people have an upset stomach after surgery. This is often due to anesthesia, pain, pain medications, or the stress of surgery. The following tips will help you manage nausea and get good nutrition as you recover. If you were on a special diet before surgery, ask your doctor if you should follow it during recovery. These tips may help:  · Dont push yourself to eat. Your body will tell you when to eat and how much.  · Start off with clear liquids and soup. They are easier to digest.  · Progress to semi-solid foods (mashed potatoes, applesauce, and gelatin) as you feel ready.  · Slowly move to  solid foods. Dont eat fatty, rich, or spicy foods at first.  · Dont force yourself to have three large meals a day. Instead, eat smaller amounts more often.  · Take pain medications with a small amount of solid food, such as crackers or toast to avoid nausea.      Call your surgeon if  · You still have pain an hour after taking medication (it may not be strong enough).  · You feel too sleepy, dizzy, or groggy (medication may be too strong).  · You have side effects like nausea, vomiting, or skin changes (rash, itching, or hives).   © 7590-8394 [employer]. [address] [place] [address], [place] [address]. All rights reserved. This information is not intended as a substitute for professional medical care. Always follow your healthcare professional's instructions.

## 2017-04-12 NOTE — OP NOTE
DATE OF PROCEDURE:  04/12/2017    PREOPERATIVE DIAGNOSIS:  Distal biceps tendon rupture, left elbow.    POSTOPERATIVE DIAGNOSIS:  Distal biceps tendon rupture, left elbow.    OPERATIVE PROCEDURE:  Biceps tendon repair, distal left elbow using Arthrex   Bio-Tenodesis screw and Endobutton.    SURGEON:  Montana Russ Jr., M.D.    ANESTHESIA:  General endotracheal.    ESTIMATED BLOOD LOSS:  Minimal.    COMPLICATIONS:  None.    BRIEF INDICATIONS:  A 47-year-old male sustained rupture, left distal biceps,   complete, taken to Surgery for the above procedure.    OPERATIVE PROCEDURE IN DETAIL:  After operative consent was obtained, the   patient brought to the Operating Room, placed supine on the operating room   table.  Anesthesia by GET method was performed by the Anesthesia staff.  After   the patient was asleep, tourniquet applied high on the left arm.  Left upper   prepped and draped out in the normal sterile fashion.  Esmarch used to   exsanguinate the limb.  Tourniquet inflated to 250 mmHg.    Following this, a longitudinal incision made just distal to the antecubital   fossa with a #15 blade.  Full-thickness skin flaps elevated, hemostasis achieved   with the Bovie.  Subcutaneous dissection used to dissect proximally and a   finger was used to retrieve the biceps tendon, which was retracted and curled up   on itself.  A pulling suture was placed in the leading edge of the biceps   tendon.  It was stretched back out to length and now trimmed up slightly.    Deep dissection was then performed in the biceps tendon interval passing down   between the brachioradialis and the pronator all the way down on to the radial   tuberosity.  The leash of Vincenzo was carefully ligated with 3-0 Vicryls ties.    Deep dissection was carefully performed to elevate soft tissue off of the   bicipital tuberosity and the forearm was held in full supination to allow this.    The posterior interosseous nerve was carefully protected by  supination maneuver   and sparing use of deep retractors.  Soft tissue was cleared off of the   bicipital tuberosity and a guidepin was then placed center-center position on   the radial tuberosity 30 degrees ulnar.    After drilling the guidepin, the reamer was used to ream unicortical over the   guidepin to create a bone tunnel.  The wound was then thoroughly irrigated of   bone debris and suctioned.    The pin was then removed and the Endobutton was applied to the sutures, which   had been previously placed in a special whipstitched through the leading edge of   the biceps tendon, which have been sized at a 7-mm size.  The Endobutton was   then passed through the tunnel, doubled back on itself with good purchase on the   distal cortex.    The sutures were then alternatively tightened up, bringing the biceps tendon   down into the bone tunnel all the way down in 15 mm.    After this had been done, the Bio-Tenodesis screw was carefully placed on the   radial side of the tunnel, capturing the tendon, and locking in place with good   purchase.  The suture was tied over the button to add security and range of   motion then checked and noted to be full.  Excess suture cut.  The wound   thoroughly irrigated with antibiotic saline solution.  Hemostasis achieved with   the Bovie.  The subcutaneous tissue closed with 3-0 Vicryl and Steri-Strips on   the skin.  Sterile dressing applied followed by a well-padded posterior splint   at 90 degrees.  Tourniquet deflated.  The patient extubated and brought to the   Recovery Room in stable condition.  All sponge and needle counts reported as   correct.  No complications.      NAFISA  dd: 04/12/2017 10:17:43 (CDT)  td: 04/12/2017 12:51:17 (CD)  Doc ID   #[id number]  Job ID #[id number]    CC:

## 2017-04-18 ENCOUNTER — TELEPHONE (OUTPATIENT)
Dept: ORTHOPEDICS | Facility: CLINIC | Age: 47
End: 2017-04-18

## 2017-04-18 NOTE — TELEPHONE ENCOUNTER
----- Message from Alicia Mott sent at 4/18/2017  9:00 AM CDT -----  Contact: [phone]/ self   Patient was told to follow up 8 days after his procedure but the soonest post-op is 05/0917.  Patient would like to be seen sooner than the next available appointment. Patient would like to know if he can take is cast off. Please advise.

## 2017-04-24 ENCOUNTER — OFFICE VISIT (OUTPATIENT)
Dept: ORTHOPEDICS | Facility: CLINIC | Age: 47
End: 2017-04-24
Payer: COMMERCIAL

## 2017-04-24 VITALS — BODY MASS INDEX: 34.54 KG/M2 | HEIGHT: 72 IN | WEIGHT: 255 LBS

## 2017-04-24 DIAGNOSIS — S46.212D RUPTURE OF LEFT DISTAL BICEPS TENDON, SUBSEQUENT ENCOUNTER: Primary | ICD-10-CM

## 2017-04-24 PROCEDURE — 99024 POSTOP FOLLOW-UP VISIT: CPT | Mod: S$GLB,,, | Performed by: ORTHOPAEDIC SURGERY

## 2017-04-24 PROCEDURE — 99999 PR PBB SHADOW E&M-EST. PATIENT-LVL II: CPT | Mod: PBBFAC,,, | Performed by: ORTHOPAEDIC SURGERY

## 2017-04-24 NOTE — PROGRESS NOTES
Mr. Yang is about 12 days out from biceps tendon repair, left elbow distal.    He is doing well.    PHYSICAL EXAMINATION:  LEFT ARM:  The incision looks great, but he does have some blistering from the   Steri-Strips.  No evidence of infection.  Range of motion of the elbow is   limited, but he has good palpable biceps tendon anteriorly.  No evidence of   infection.    PLAN:  I instructed an appropriate wound care for the blistering.  Gentle range   of motion only.  Continue sling, especially when out of the house.  No heavy   lifting.  I have cautioned him about any overuse with the left arm.  Follow up   in three weeks.      MARIELLA  dd: 04/24/2017 10:33:32 (CDT)  td: 04/25/2017 01:17:19 (CDT)  Doc ID   #[id number]  Job ID #[id number]    CC:

## 2017-09-27 ENCOUNTER — OFFICE VISIT (OUTPATIENT)
Dept: UROLOGY | Facility: CLINIC | Age: 47
End: 2017-09-27
Payer: COMMERCIAL

## 2017-09-27 ENCOUNTER — LAB VISIT (OUTPATIENT)
Dept: LAB | Facility: HOSPITAL | Age: 47
End: 2017-09-27
Attending: UROLOGY
Payer: COMMERCIAL

## 2017-09-27 VITALS
DIASTOLIC BLOOD PRESSURE: 99 MMHG | WEIGHT: 255 LBS | HEIGHT: 72 IN | SYSTOLIC BLOOD PRESSURE: 150 MMHG | HEART RATE: 73 BPM | BODY MASS INDEX: 34.54 KG/M2

## 2017-09-27 DIAGNOSIS — Z12.5 PROSTATE CANCER SCREENING: ICD-10-CM

## 2017-09-27 DIAGNOSIS — R03.0 BLOOD PRESSURE ELEVATED WITHOUT HISTORY OF HTN: ICD-10-CM

## 2017-09-27 DIAGNOSIS — Z80.42 FAMILY HISTORY OF PROSTATE CANCER: ICD-10-CM

## 2017-09-27 DIAGNOSIS — R35.0 FREQUENCY OF URINATION: Primary | ICD-10-CM

## 2017-09-27 LAB
BILIRUB SERPL-MCNC: NORMAL MG/DL
BLOOD URINE, POC: NORMAL
COLOR, POC UA: NORMAL
COMPLEXED PSA SERPL-MCNC: 0.33 NG/ML
GLUCOSE UR QL STRIP: NORMAL
KETONES UR QL STRIP: NORMAL
LEUKOCYTE ESTERASE URINE, POC: NORMAL
NITRITE, POC UA: NORMAL
PH, POC UA: 6
POC RESIDUAL URINE VOLUME: 25 ML (ref 0–100)
PROTEIN, POC: NORMAL
SPECIFIC GRAVITY, POC UA: 1.01
UROBILINOGEN, POC UA: NORMAL

## 2017-09-27 PROCEDURE — 51798 US URINE CAPACITY MEASURE: CPT | Mod: S$GLB,,, | Performed by: UROLOGY

## 2017-09-27 PROCEDURE — 36415 COLL VENOUS BLD VENIPUNCTURE: CPT

## 2017-09-27 PROCEDURE — 84153 ASSAY OF PSA TOTAL: CPT

## 2017-09-27 PROCEDURE — 87086 URINE CULTURE/COLONY COUNT: CPT

## 2017-09-27 PROCEDURE — 99999 PR PBB SHADOW E&M-EST. PATIENT-LVL III: CPT | Mod: PBBFAC,,, | Performed by: UROLOGY

## 2017-09-27 PROCEDURE — 81001 URINALYSIS AUTO W/SCOPE: CPT | Mod: S$GLB,,, | Performed by: UROLOGY

## 2017-09-27 PROCEDURE — 99204 OFFICE O/P NEW MOD 45 MIN: CPT | Mod: 25,S$GLB,, | Performed by: UROLOGY

## 2017-09-27 PROCEDURE — 3008F BODY MASS INDEX DOCD: CPT | Mod: S$GLB,,, | Performed by: UROLOGY

## 2017-09-27 RX ORDER — OXYBUTYNIN CHLORIDE 5 MG/1
5 TABLET ORAL 3 TIMES DAILY
Qty: 90 TABLET | Refills: 12 | Status: SHIPPED | OUTPATIENT
Start: 2017-09-27 | End: 2018-05-22

## 2017-09-27 NOTE — PROGRESS NOTES
HPI:  Esa Yang is a 47 y.o. year old male that  presents with No chief complaint on file.  .  This patient refers himself to the office with intermittent frequency especially at night.  This is been going on and off for approximately 6-12 months.  Patient has no dysuria    He has no some decreased force of stream with no straining to void.  He is not sure that he fully empties his bladder.  Usually he gets up only one time at night but occasionally has up to 3 times.  Bladder scan postvoid residual the office today is okay at 25 cc    Patient status at prostate cancer at age 69.  Other vasectomy patient had has had no other urologic intervention.  There is no family history of kidney or bladder cancer graft chart review shows ruptured biceps in orthopedic note from April of this year.  November 2014 serum creatinine was 1.2      Past Medical History:   Diagnosis Date    Allergy     Back pain     GERD (gastroesophageal reflux disease)      Social History     Social History    Marital status:      Spouse name: N/A    Number of children: N/A    Years of education: N/A     Occupational History    Not on file.     Social History Main Topics    Smoking status: Never Smoker    Smokeless tobacco: Never Used    Alcohol use Yes      Comment: occasional    Drug use: No    Sexual activity: Yes     Other Topics Concern    Not on file     Social History Narrative    No narrative on file     Past Surgical History:   Procedure Laterality Date    ANKLE SURGERY Left     BACK SURGERY  01/30/2017    KNEE SURGERY      x 5    VASECTOMY       Family History   Problem Relation Age of Onset    Prostate cancer Father     Kidney cancer Neg Hx            Review of Systems  The patient has no chest pains.  The patient has no shortness of breath  Patient wears        glasses.  All other review of systems are negative.      Physical Exam:  BP (!) 150/99   Pulse 73   Ht 6' (1.829 m)   Wt 115.7 kg (255 lb)    BMI 34.58 kg/m²   General appearance: alert, cooperative, no distress  Constitutional:Oriented to person, place, and time.appears well-developed and well-nourished.   HEENT: Normocephalic, atraumatic, neck symmetrical, no nasal discharge   Eyes: conjunctivae/corneas clear, PERRL, EOM's intact  Lungs: clear to auscultation bilaterally, no dullness to percussion bilaterally  Heart: regular rate and rhythm without rub; no displacement of the PMI   Abdomen: soft, non-tender; bowel sounds normoactive; no organomegaly  :Penis/perineum without lesions, scrotum without rash/cysts, epididymis nontender bilaterally, urethral meatus in normal location normal size, no penile plaques palpated, prostate:  Smooth small and unremarkable                     seminal vesicles not palpated.  No rectal masses, sphincter tone normal.  Testes equal in size without masses  Extremities: extremities symmetric; no clubbing, cyanosis, or edema  Integument: Skin color, texture, turgor normal; no rashes; hair distrubution normal  Neurologic: Alert and oriented X 3, normal strength, normal coordination and gait  Psychiatric: no pressured speech; normal affect; no evidence of impaired cognition     LABS:    Complete Blood Count  Lab Results   Component Value Date    RBC 5.49 11/10/2014    HGB 14.4 11/10/2014    HCT 42.8 11/10/2014    MCV 78 (L) 11/10/2014    MCH 26.2 (L) 11/10/2014    MCHC 33.6 11/10/2014    RDW 13.5 11/10/2014     11/10/2014    MPV 11.4 11/10/2014    GRAN 4.6 11/10/2014    GRAN 56.4 11/10/2014    LYMPH 2.8 11/10/2014    LYMPH 34.2 11/10/2014    MONO 0.6 11/10/2014    MONO 7.0 11/10/2014    EOS 0.2 11/10/2014    BASO 0.01 11/10/2014    EOSINOPHIL 2.3 11/10/2014    BASOPHIL 0.1 11/10/2014    DIFFMETHOD Automated 11/10/2014       Comprehensive Metabolic Panel  Lab Results   Component Value Date    GLU 94 11/10/2014    BUN 16 11/10/2014    CREATININE 1.2 11/10/2014     11/10/2014    K 4.4 11/10/2014      11/10/2014    PROT 8.1 11/10/2014    ALBUMIN 4.1 11/10/2014    BILITOT 0.6 11/10/2014    AST 20 11/10/2014    ALKPHOS 90 11/10/2014    CO2 24 11/10/2014    ALT 16 11/10/2014    ANIONGAP 10 11/10/2014    EGFRNONAA >60 11/10/2014    ESTGFRAFRICA >60 11/10/2014       PSA  No results found for: PSA      Assessment:    ICD-10-CM ICD-9-CM    1. Frequency of urination R35.0 788.41 POCT urinalysis, dipstick or tablet reag      Urine culture      POCT Bladder Scan   2. Blood pressure elevated without history of HTN R03.0 796.2    3. Family history of prostate cancer Z80.42 V16.42    4. Prostate cancer screening Z12.5 V76.44 PSA, Screening     The primary encounter diagnosis was Frequency of urination. Diagnoses of Blood pressure elevated without history of HTN, Family history of prostate cancer, and Prostate cancer screening were also pertinent to this visit.      Plan:1 intermittent frequency of urination especially at night.  Plan.  I discussed with patient the difficulty of explaining intermittent symptoms.  Further discussed that I do not feel that enlargement of his prostate is an issue.  I discussed risks and benefits of daily at bedtime oxybutynin which he wants to give it a try.  Patient warned about dry mouth and constipation and difficulty voiding.  Follow-up 2 months for recheck    #2.   Elevated blood pressure.  Plan.  This finding pointed out to the patient.  I recommend that the patient follow up with the patient's PCP for this.    #3.  And #4. Family history of prostate cancer.  I discussed with the patient that this places him at increased risk for developing prostate cancer.  I discussed that starting at age 40 he needs yearly PSAs and yearly digital rectal exams which are usually performed by his PCP.  I recommend screening PSA torsion patient agrees.  We will contact the patient with any significant finding  Orders Placed This Encounter   Procedures    Urine culture    PSA, Screening    POCT urinalysis,  dipstick or tablet reag    POCT Bladder Scan           Humble Green MD    PLEASE NOTE:  Please be advised that portions of this note were dictated using voice recognition software and may contain dictation related errors in spelling/grammar/appropriate pronouns/syntax or other errors that might have not been found and or corrected on text review.

## 2017-09-29 LAB — BACTERIA UR CULT: NORMAL

## 2018-05-22 ENCOUNTER — OFFICE VISIT (OUTPATIENT)
Dept: UROLOGY | Facility: CLINIC | Age: 48
End: 2018-05-22
Payer: COMMERCIAL

## 2018-05-22 VITALS — WEIGHT: 255 LBS | BODY MASS INDEX: 34.54 KG/M2 | HEIGHT: 72 IN

## 2018-05-22 DIAGNOSIS — N39.43 POST-VOID DRIBBLING: ICD-10-CM

## 2018-05-22 DIAGNOSIS — R39.11 HESITANCY: ICD-10-CM

## 2018-05-22 DIAGNOSIS — R79.89 LOW TESTOSTERONE IN MALE: ICD-10-CM

## 2018-05-22 DIAGNOSIS — R35.0 BENIGN PROSTATIC HYPERPLASIA WITH URINARY FREQUENCY: ICD-10-CM

## 2018-05-22 DIAGNOSIS — N40.1 BENIGN PROSTATIC HYPERPLASIA WITH URINARY FREQUENCY: ICD-10-CM

## 2018-05-22 DIAGNOSIS — R35.1 NOCTURIA: ICD-10-CM

## 2018-05-22 DIAGNOSIS — R39.198 SLOW URINARY STREAM: ICD-10-CM

## 2018-05-22 LAB
BILIRUB UR QL STRIP: NEGATIVE
CLARITY UR REFRACT.AUTO: CLEAR
COLOR UR AUTO: YELLOW
GLUCOSE UR QL STRIP: NEGATIVE
HGB UR QL STRIP: NEGATIVE
KETONES UR QL STRIP: NEGATIVE
LEUKOCYTE ESTERASE UR QL STRIP: NEGATIVE
NITRITE UR QL STRIP: NEGATIVE
PH UR STRIP: 5 [PH] (ref 5–8)
PROT UR QL STRIP: NEGATIVE
SP GR UR STRIP: 1.02 (ref 1–1.03)
URN SPEC COLLECT METH UR: NORMAL
UROBILINOGEN UR STRIP-ACNC: NEGATIVE EU/DL

## 2018-05-22 PROCEDURE — 99999 PR PBB SHADOW E&M-EST. PATIENT-LVL III: CPT | Mod: PBBFAC,,, | Performed by: UROLOGY

## 2018-05-22 PROCEDURE — 81003 URINALYSIS AUTO W/O SCOPE: CPT

## 2018-05-22 PROCEDURE — 3008F BODY MASS INDEX DOCD: CPT | Mod: CPTII,S$GLB,, | Performed by: UROLOGY

## 2018-05-22 PROCEDURE — 99215 OFFICE O/P EST HI 40 MIN: CPT | Mod: S$GLB,,, | Performed by: UROLOGY

## 2018-05-22 RX ORDER — TAMSULOSIN HYDROCHLORIDE 0.4 MG/1
0.4 CAPSULE ORAL DAILY
Qty: 30 CAPSULE | Refills: 11 | Status: SHIPPED | OUTPATIENT
Start: 2018-05-22 | End: 2019-01-11 | Stop reason: SDUPTHER

## 2018-05-22 NOTE — PROGRESS NOTES
Subjective:       Patient ID: Esa Yang is a 48 y.o. male.    Chief Complaint: Urinary Frequency and Other (saw dr moore 9/2017 for frequency and psa screening)    49 yo AAM with history of low Testosterone. In past. Lost to F/U and off meds. LUTS due to BPH. Seen by  last year with no improvement. Here for re-initiation of treatment.        Urinary Frequency    This is a chronic (Every 2 hours) problem. The current episode started more than 1 year ago. The problem occurs every urination. The problem has been unchanged. The pain is at a severity of 0/10. The patient is experiencing no pain. There has been no fever. He is sexually active. There is no history of pyelonephritis. Associated symptoms include frequency, hesitancy and urgency. Pertinent negatives include no behavior changes, chills, discharge, flank pain, hematuria, nausea, possible pregnancy, sweats, vomiting, weight loss, bubble bath use, constipation, rash or withholding. He has tried nothing for the symptoms. There is no history of catheterization, diabetes insipidus, diabetes mellitus, genitourinary reflux, hypertension, kidney stones, recurrent UTIs, a single kidney, STD, urinary stasis or a urological procedure.   Other   This is a chronic (Low Testosterone) problem. The current episode started more than 1 year ago. The problem occurs constantly. The problem has been waxing and waning. Associated symptoms include fatigue. Pertinent negatives include no abdominal pain, anorexia, arthralgias, change in bowel habit, chest pain, chills, congestion, coughing, diaphoresis, fever, headaches, joint swelling, myalgias, nausea, neck pain, numbness, rash, sore throat, swollen glands, urinary symptoms, vertigo, visual change, vomiting or weakness. Associated symptoms comments: Gaining weight.   Benign Prostatic Hypertrophy   This is a chronic problem. The current episode started more than 1 year ago. The problem has been gradually worsening  since onset. Irritative symptoms include frequency, nocturia ( x1-3) and urgency. Obstructive symptoms include dribbling (post-void), a slower stream and a weak stream. Obstructive symptoms do not include incomplete emptying, an intermittent stream or straining. Associated symptoms include hesitancy. Pertinent negatives include no chills, dysuria, genital pain, hematuria, nausea or vomiting. AUA score is 8-19. He is sexually active. The symptoms are aggravated by caffeine. Past treatments include nothing.     Review of Systems   Constitutional: Positive for fatigue. Negative for activity change, appetite change, chills, diaphoresis, fever, unexpected weight change and weight loss.   HENT: Negative for congestion, hearing loss, sinus pressure, sore throat and trouble swallowing.    Eyes: Negative for photophobia, pain, discharge and visual disturbance.   Respiratory: Negative for apnea, cough and shortness of breath.    Cardiovascular: Negative for chest pain, palpitations and leg swelling.   Gastrointestinal: Negative for abdominal distention, abdominal pain, anal bleeding, anorexia, blood in stool, change in bowel habit, constipation, diarrhea, nausea, rectal pain and vomiting.   Endocrine: Negative for cold intolerance, heat intolerance, polydipsia, polyphagia and polyuria.   Genitourinary: Positive for frequency, hesitancy, nocturia ( x1-3) and urgency. Negative for decreased urine volume, difficulty urinating, discharge, dysuria, enuresis, flank pain, genital sores, hematuria, incomplete emptying, penile pain, penile swelling, scrotal swelling and testicular pain.   Musculoskeletal: Negative for arthralgias, back pain, joint swelling, myalgias and neck pain.   Skin: Negative for color change, pallor, rash and wound.   Allergic/Immunologic: Negative for environmental allergies, food allergies and immunocompromised state.   Neurological: Negative for dizziness, vertigo, seizures, weakness, numbness and headaches.    Hematological: Negative for adenopathy. Does not bruise/bleed easily.   Psychiatric/Behavioral: Negative.        Objective:      Physical Exam   Nursing note and vitals reviewed.  Constitutional: He is oriented to person, place, and time. He appears well-developed and well-nourished.   HENT:   Head: Normocephalic.   Nose: Nose normal.   Mouth/Throat: Oropharynx is clear and moist.   Eyes: Conjunctivae and EOM are normal. Pupils are equal, round, and reactive to light.   Neck: Normal range of motion. Neck supple.   Cardiovascular: Normal rate, regular rhythm, normal heart sounds and intact distal pulses.    Pulmonary/Chest: Effort normal and breath sounds normal.   Abdominal: Soft. Bowel sounds are normal.   Genitourinary: Rectum normal, testes normal and penis normal. Prostate is enlarged. Prostate is not tender. Circumcised.   Musculoskeletal: Normal range of motion.   Neurological: He is alert and oriented to person, place, and time. He has normal reflexes.   Skin: Skin is warm and dry.     Psychiatric: He has a normal mood and affect. His behavior is normal. Judgment and thought content normal.       Assessment:       1. Nocturia    2. Benign prostatic hyperplasia with urinary frequency    3. Hesitancy    4. Slow urinary stream    5. Post-void dribbling    6. Low testosterone in male        Plan:       Patient Instructions   Check T level  U/A  Flomax daily  F/U 3 wks for TRT trial

## 2018-06-19 ENCOUNTER — OFFICE VISIT (OUTPATIENT)
Dept: UROLOGY | Facility: CLINIC | Age: 48
End: 2018-06-19
Payer: COMMERCIAL

## 2018-06-19 ENCOUNTER — LAB VISIT (OUTPATIENT)
Dept: LAB | Facility: HOSPITAL | Age: 48
End: 2018-06-19
Attending: UROLOGY
Payer: COMMERCIAL

## 2018-06-19 VITALS
HEART RATE: 79 BPM | HEIGHT: 72 IN | WEIGHT: 260 LBS | SYSTOLIC BLOOD PRESSURE: 106 MMHG | DIASTOLIC BLOOD PRESSURE: 76 MMHG | BODY MASS INDEX: 35.21 KG/M2

## 2018-06-19 DIAGNOSIS — R39.198 SLOW URINARY STREAM: ICD-10-CM

## 2018-06-19 DIAGNOSIS — R79.89 LOW TESTOSTERONE IN MALE: ICD-10-CM

## 2018-06-19 DIAGNOSIS — R39.11 HESITANCY: ICD-10-CM

## 2018-06-19 DIAGNOSIS — R79.89 LOW TESTOSTERONE IN MALE: Primary | ICD-10-CM

## 2018-06-19 DIAGNOSIS — R35.0 BENIGN PROSTATIC HYPERPLASIA WITH URINARY FREQUENCY: ICD-10-CM

## 2018-06-19 DIAGNOSIS — R35.1 NOCTURIA: ICD-10-CM

## 2018-06-19 DIAGNOSIS — N39.43 POST-VOID DRIBBLING: ICD-10-CM

## 2018-06-19 DIAGNOSIS — N40.1 BENIGN PROSTATIC HYPERPLASIA WITH URINARY FREQUENCY: ICD-10-CM

## 2018-06-19 LAB — TESTOST SERPL-MCNC: 446 NG/DL

## 2018-06-19 PROCEDURE — 84403 ASSAY OF TOTAL TESTOSTERONE: CPT | Mod: PO

## 2018-06-19 PROCEDURE — 3008F BODY MASS INDEX DOCD: CPT | Mod: CPTII,S$GLB,, | Performed by: UROLOGY

## 2018-06-19 PROCEDURE — 99214 OFFICE O/P EST MOD 30 MIN: CPT | Mod: S$GLB,,, | Performed by: UROLOGY

## 2018-06-19 PROCEDURE — 99999 PR PBB SHADOW E&M-EST. PATIENT-LVL III: CPT | Mod: PBBFAC,,, | Performed by: UROLOGY

## 2018-06-19 PROCEDURE — 36415 COLL VENOUS BLD VENIPUNCTURE: CPT | Mod: PO

## 2018-06-19 RX ORDER — TESTOSTERONE CYPIONATE 200 MG/ML
200 INJECTION, SOLUTION INTRAMUSCULAR
Qty: 10 ML | Refills: 1 | Status: SHIPPED | OUTPATIENT
Start: 2018-06-19 | End: 2018-12-18

## 2018-06-19 NOTE — PATIENT INSTRUCTIONS
Continue Flomax  Check T level x 2  (once in AM)  Replace Prior TRT with T Cypionate 200 mg q 2 weeks (1cc)  F/U 3 months with T level and PSA level.

## 2018-06-19 NOTE — PROGRESS NOTES
Subjective:       Patient ID: Esa Yang is a 48 y.o. male.    Chief Complaint: Follow-up and Urinary Frequency    49 yo AAM with (mild) ED and chronic low T. Failed gels and Axiron in the past. Desires TRT injection. LUTS and BPH ( just started Flomax 5 days ago)      Urinary Frequency    This is a chronic problem. The current episode started acute onset. The problem occurs every urination. The problem has been unchanged. The pain is at a severity of 0/10. The patient is experiencing no pain. He is sexually active. There is no history of pyelonephritis. Associated symptoms include frequency (Noctureia x 5-6, Freq x 5-6), hesitancy and urgency. Pertinent negatives include no behavior changes, chills, discharge, flank pain, hematuria, nausea, possible pregnancy, sweats, vomiting, weight loss, bubble bath use, constipation, rash or withholding. Associated symptoms comments: Weak Stream. Treatments tried: Flomax Started 5 days ago) The treatment provided mild relief. There is no history of catheterization, diabetes insipidus, diabetes mellitus, genitourinary reflux, hypertension, kidney stones, recurrent UTIs, a single kidney, STD, urinary stasis or a urological procedure.   Benign Prostatic Hypertrophy   This is a chronic problem. The current episode started more than 1 year ago. The problem is unchanged. Irritative symptoms include frequency (Noctureia x 5-6, Freq x 5-6), nocturia and urgency. Obstructive symptoms include dribbling, incomplete emptying, a slower stream and a weak stream. Obstructive symptoms do not include an intermittent stream or straining. Associated symptoms include hesitancy. Pertinent negatives include no chills, dysuria, genital pain, hematuria, nausea or vomiting. AUA score is 8-19. He is sexually active. Nothing aggravates the symptoms. Past treatments include tamsulosin. The treatment provided mild relief. He has been using treatment for 1 to 7 days.     Review of Systems    Constitutional: Negative for activity change, appetite change, chills, diaphoresis, fatigue, fever, unexpected weight change and weight loss.   HENT: Negative for congestion, hearing loss, sinus pressure and trouble swallowing.    Eyes: Negative for photophobia, pain, discharge and visual disturbance.   Respiratory: Negative for apnea, cough and shortness of breath.    Cardiovascular: Negative for chest pain, palpitations and leg swelling.   Gastrointestinal: Negative for abdominal distention, abdominal pain, anal bleeding, blood in stool, constipation, diarrhea, nausea, rectal pain and vomiting.   Endocrine: Negative for cold intolerance, heat intolerance, polydipsia, polyphagia and polyuria.   Genitourinary: Positive for frequency (Noctureia x 5-6, Freq x 5-6), hesitancy, incomplete emptying, nocturia and urgency. Negative for decreased urine volume, difficulty urinating, discharge, dysuria, enuresis, flank pain, genital sores, hematuria, penile pain, penile swelling, scrotal swelling and testicular pain.   Musculoskeletal: Negative for arthralgias, back pain and myalgias.   Skin: Negative for color change, pallor, rash and wound.   Allergic/Immunologic: Negative for environmental allergies, food allergies and immunocompromised state.   Neurological: Negative for dizziness, seizures, weakness and headaches.   Hematological: Negative for adenopathy. Does not bruise/bleed easily.   Psychiatric/Behavioral: Negative.        Objective:      Physical Exam   Nursing note and vitals reviewed.  Constitutional: He is oriented to person, place, and time. He appears well-developed and well-nourished.   HENT:   Head: Normocephalic.   Nose: Nose normal.   Mouth/Throat: Oropharynx is clear and moist.   Eyes: Conjunctivae and EOM are normal. Pupils are equal, round, and reactive to light.   Neck: Normal range of motion. Neck supple.   Cardiovascular: Normal rate, regular rhythm, normal heart sounds and intact distal pulses.     Pulmonary/Chest: Effort normal and breath sounds normal.   Abdominal: Soft. Bowel sounds are normal.   Musculoskeletal: Normal range of motion.   Neurological: He is alert and oriented to person, place, and time. He has normal reflexes.   Skin: Skin is warm and dry.     Psychiatric: He has a normal mood and affect. His behavior is normal. Judgment and thought content normal.       Assessment:       1. Low testosterone in male    2. Benign prostatic hyperplasia with urinary frequency    3. Hesitancy    4. Nocturia    5. Post-void dribbling    6. Slow urinary stream        Plan:       Patient Instructions   Continue Flomax  Check T level x 2  (once in AM)  Replace Prior TRT with T Cypionate 200 mg q 2 weeks (1cc)  F/U 3 months with T level and PSA level.

## 2018-06-20 ENCOUNTER — TELEPHONE (OUTPATIENT)
Dept: UROLOGY | Facility: CLINIC | Age: 48
End: 2018-06-20

## 2018-06-20 DIAGNOSIS — R79.89 LOW TESTOSTERONE: Primary | ICD-10-CM

## 2018-06-21 ENCOUNTER — LAB VISIT (OUTPATIENT)
Dept: LAB | Facility: HOSPITAL | Age: 48
End: 2018-06-21
Attending: UROLOGY
Payer: COMMERCIAL

## 2018-06-21 DIAGNOSIS — R79.89 LOW TESTOSTERONE: ICD-10-CM

## 2018-06-21 LAB — TESTOST SERPL-MCNC: 281 NG/DL

## 2018-06-21 PROCEDURE — 36415 COLL VENOUS BLD VENIPUNCTURE: CPT | Mod: PO

## 2018-06-21 PROCEDURE — 84403 ASSAY OF TOTAL TESTOSTERONE: CPT | Mod: PO

## 2018-09-09 ENCOUNTER — HOSPITAL ENCOUNTER (EMERGENCY)
Facility: HOSPITAL | Age: 48
Discharge: HOME | End: 2018-09-09
Attending: FAMILY MEDICINE
Payer: COMMERCIAL

## 2018-09-09 VITALS
WEIGHT: 255 LBS | OXYGEN SATURATION: 100 % | TEMPERATURE: 98 F | HEART RATE: 82 BPM | DIASTOLIC BLOOD PRESSURE: 76 MMHG | SYSTOLIC BLOOD PRESSURE: 123 MMHG | HEIGHT: 72 IN | RESPIRATION RATE: 20 BRPM | BODY MASS INDEX: 34.54 KG/M2

## 2018-09-09 DIAGNOSIS — K81.0 CHOLECYSTITIS, ACUTE: Primary | ICD-10-CM

## 2018-09-09 LAB
ALBUMIN SERPL BCP-MCNC: 4.2 G/DL
ALP SERPL-CCNC: 105 U/L
ALT SERPL W/O P-5'-P-CCNC: 15 U/L
AMYLASE SERPL-CCNC: 90 U/L
ANION GAP SERPL CALC-SCNC: 8 MMOL/L
APTT BLDCRRT: 32.6 SEC
AST SERPL-CCNC: 24 U/L
BACTERIA #/AREA URNS AUTO: NORMAL /HPF
BASOPHILS # BLD AUTO: 0.01 K/UL
BASOPHILS NFR BLD: 0.1 %
BILIRUB SERPL-MCNC: 0.7 MG/DL
BILIRUB UR QL STRIP: NEGATIVE
BUN SERPL-MCNC: 19 MG/DL
CALCIUM SERPL-MCNC: 9.4 MG/DL
CHLORIDE SERPL-SCNC: 101 MMOL/L
CLARITY UR REFRACT.AUTO: CLEAR
CO2 SERPL-SCNC: 28 MMOL/L
COLOR UR AUTO: YELLOW
CREAT SERPL-MCNC: 1.47 MG/DL
DIFFERENTIAL METHOD: ABNORMAL
EOSINOPHIL # BLD AUTO: 0.1 K/UL
EOSINOPHIL NFR BLD: 1 %
ERYTHROCYTE [DISTWIDTH] IN BLOOD BY AUTOMATED COUNT: 13.8 %
EST. GFR  (AFRICAN AMERICAN): >60 ML/MIN/1.73 M^2
EST. GFR  (NON AFRICAN AMERICAN): 55.2 ML/MIN/1.73 M^2
GLUCOSE SERPL-MCNC: 107 MG/DL
GLUCOSE UR QL STRIP: NEGATIVE
HCT VFR BLD AUTO: 42.6 %
HGB BLD-MCNC: 14.3 G/DL
HGB UR QL STRIP: ABNORMAL
HYALINE CASTS UR QL AUTO: 0 /LPF
INR PPP: 1.2
KETONES UR QL STRIP: NEGATIVE
LEUKOCYTE ESTERASE UR QL STRIP: NEGATIVE
LIPASE SERPL-CCNC: 37 U/L
LYMPHOCYTES # BLD AUTO: 2.1 K/UL
LYMPHOCYTES NFR BLD: 20.3 %
MCH RBC QN AUTO: 25.6 PG
MCHC RBC AUTO-ENTMCNC: 33.6 G/DL
MCV RBC AUTO: 76 FL
MICROSCOPIC COMMENT: NORMAL
MONOCYTES # BLD AUTO: 0.7 K/UL
MONOCYTES NFR BLD: 6.2 %
NEUTROPHILS # BLD AUTO: 7.6 K/UL
NEUTROPHILS NFR BLD: 72.2 %
NITRITE UR QL STRIP: NEGATIVE
PH UR STRIP: 6 [PH] (ref 5–8)
PLATELET # BLD AUTO: 196 K/UL
PMV BLD AUTO: 11.2 FL
POTASSIUM SERPL-SCNC: 4.2 MMOL/L
PROT SERPL-MCNC: 8.2 G/DL
PROT UR QL STRIP: ABNORMAL
PROTHROMBIN TIME: 13.3 SEC
RBC # BLD AUTO: 5.58 M/UL
RBC #/AREA URNS AUTO: 1 /HPF (ref 0–4)
SODIUM SERPL-SCNC: 137 MMOL/L
SP GR UR STRIP: 1.01 (ref 1–1.03)
TROPONIN I SERPL DL<=0.01 NG/ML-MCNC: <0.012 NG/ML
URN SPEC COLLECT METH UR: ABNORMAL
UROBILINOGEN UR STRIP-ACNC: NEGATIVE EU/DL
WBC # BLD AUTO: 10.46 K/UL
WBC #/AREA URNS AUTO: 0 /HPF (ref 0–5)

## 2018-09-09 PROCEDURE — 82150 ASSAY OF AMYLASE: CPT

## 2018-09-09 PROCEDURE — 84484 ASSAY OF TROPONIN QUANT: CPT

## 2018-09-09 PROCEDURE — 83690 ASSAY OF LIPASE: CPT

## 2018-09-09 PROCEDURE — 63600175 PHARM REV CODE 636 W HCPCS: Performed by: FAMILY MEDICINE

## 2018-09-09 PROCEDURE — 85025 COMPLETE CBC W/AUTO DIFF WBC: CPT

## 2018-09-09 PROCEDURE — 99284 EMERGENCY DEPT VISIT MOD MDM: CPT | Mod: 25

## 2018-09-09 PROCEDURE — 81000 URINALYSIS NONAUTO W/SCOPE: CPT

## 2018-09-09 PROCEDURE — 96375 TX/PRO/DX INJ NEW DRUG ADDON: CPT | Mod: 59

## 2018-09-09 PROCEDURE — 96365 THER/PROPH/DIAG IV INF INIT: CPT

## 2018-09-09 PROCEDURE — 25000003 PHARM REV CODE 250: Performed by: FAMILY MEDICINE

## 2018-09-09 PROCEDURE — 85610 PROTHROMBIN TIME: CPT

## 2018-09-09 PROCEDURE — 80053 COMPREHEN METABOLIC PANEL: CPT

## 2018-09-09 PROCEDURE — C9113 INJ PANTOPRAZOLE SODIUM, VIA: HCPCS | Performed by: FAMILY MEDICINE

## 2018-09-09 PROCEDURE — 96361 HYDRATE IV INFUSION ADD-ON: CPT

## 2018-09-09 PROCEDURE — 85730 THROMBOPLASTIN TIME PARTIAL: CPT

## 2018-09-09 RX ORDER — DICYCLOMINE HYDROCHLORIDE 20 MG/1
20 TABLET ORAL 3 TIMES DAILY PRN
Qty: 30 TABLET | Refills: 0 | Status: SHIPPED | OUTPATIENT
Start: 2018-09-09 | End: 2021-10-18

## 2018-09-09 RX ORDER — MORPHINE SULFATE 4 MG/ML
3 INJECTION, SOLUTION INTRAMUSCULAR; INTRAVENOUS
Status: COMPLETED | OUTPATIENT
Start: 2018-09-09 | End: 2018-09-09

## 2018-09-09 RX ORDER — HYOSCYAMINE SULFATE 0.5 MG/ML
0.5 INJECTION, SOLUTION SUBCUTANEOUS
Status: COMPLETED | OUTPATIENT
Start: 2018-09-09 | End: 2018-09-09

## 2018-09-09 RX ORDER — TRAMADOL HYDROCHLORIDE 50 MG/1
50 TABLET ORAL EVERY 6 HOURS PRN
Qty: 12 TABLET | Refills: 0 | Status: SHIPPED | OUTPATIENT
Start: 2018-09-09 | End: 2018-09-19

## 2018-09-09 RX ORDER — METRONIDAZOLE 500 MG/1
500 TABLET ORAL 3 TIMES DAILY
Qty: 21 TABLET | Refills: 0 | Status: SHIPPED | OUTPATIENT
Start: 2018-09-09 | End: 2018-09-16

## 2018-09-09 RX ORDER — CIPROFLOXACIN 500 MG/1
500 TABLET ORAL 2 TIMES DAILY
Qty: 20 TABLET | Refills: 0 | Status: SHIPPED | OUTPATIENT
Start: 2018-09-09 | End: 2018-09-19

## 2018-09-09 RX ORDER — PANTOPRAZOLE SODIUM 40 MG/10ML
40 INJECTION, POWDER, LYOPHILIZED, FOR SOLUTION INTRAVENOUS
Status: COMPLETED | OUTPATIENT
Start: 2018-09-09 | End: 2018-09-09

## 2018-09-09 RX ADMIN — SODIUM CHLORIDE 1000 ML: 0.9 INJECTION, SOLUTION INTRAVENOUS at 03:09

## 2018-09-09 RX ADMIN — MORPHINE SULFATE 3 MG: 4 INJECTION INTRAVENOUS at 05:09

## 2018-09-09 RX ADMIN — PANTOPRAZOLE SODIUM 40 MG: 40 INJECTION, POWDER, LYOPHILIZED, FOR SOLUTION INTRAVENOUS at 03:09

## 2018-09-09 RX ADMIN — HYOSCYAMINE SULFATE 0.5 MG: 0.5 INJECTION, SOLUTION SUBCUTANEOUS at 03:09

## 2018-09-09 RX ADMIN — PIPERACILLIN AND TAZOBACTAM 4.5 G: 4; .5 INJECTION, POWDER, LYOPHILIZED, FOR SOLUTION INTRAVENOUS; PARENTERAL at 05:09

## 2018-09-09 NOTE — ED PROVIDER NOTES
"Encounter Date: 9/9/2018       History     Chief Complaint   Patient presents with    Abdominal Pain     RUQ pain that started tonight; denies N/V/D; "sharp" constant pain     Patient complains of right upper quadrant pain since 10:00 p.m. last night.  Patient had fried chicken at 5:00 p.m..  Denies drinking any alcohol.   pain got worse with nausea came to the ER for evaluation.  Denies any fever, chills or rigors.  No diarrhea.  No chest pain.      The history is provided by the patient.     Review of patient's allergies indicates:   Allergen Reactions    Sulfa (sulfonamide antibiotics) Swelling    Neosporin (neomycin-polymyx) Rash     Topical irritation     Aspirin Swelling    Latex, natural rubber Hives and Itching    Shellfish containing products Swelling     Past Medical History:   Diagnosis Date    Allergy     Back pain     GERD (gastroesophageal reflux disease)      Past Surgical History:   Procedure Laterality Date    ANKLE SURGERY Left     ARTHROSCOPY-KNEE DEBRIDEMENT Left 12/31/2014    Performed by Sergey Kruse [facility] at [facility]    BACK SURGERY  01/30/2017    KNEE SURGERY      x 5    MENISCECTOMY Left 12/31/2014    Performed by [facility] at [facility]    REPAIR-TENDON-BICEP Left 4/12/2017    Performed by Montana Russ Jr., MD at [facility]    SYNOVECTOMY-KNEE Left 12/31/2014    Performed by [facility] Kruse [facility] at [facility]    VASECTOMY       Family History   Problem Relation Age of Onset    Prostate cancer Father     Kidney cancer Neg Hx      Social History     Tobacco Use    Smoking status: Never Smoker    Smokeless tobacco: Never Used   Substance Use Topics    Alcohol use: Yes     Comment: occasional    Drug use: No     Review of Systems   Constitutional: Negative for activity change, appetite change, chills and fever.   HENT: Negative for congestion, ear discharge, rhinorrhea, sinus pressure, sinus pain, sore throat and trouble swallowing.    Eyes: Negative for " photophobia, pain, discharge, redness, itching and visual disturbance.   Respiratory: Negative for cough, chest tightness, shortness of breath and wheezing.    Cardiovascular: Negative for chest pain, palpitations and leg swelling.   Gastrointestinal: Positive for abdominal pain. Negative for abdominal distention, constipation, diarrhea, nausea and vomiting.   Genitourinary: Negative for dysuria, flank pain, frequency and hematuria.   Musculoskeletal: Negative for back pain, gait problem, neck pain and neck stiffness.   Skin: Negative for rash and wound.   Neurological: Negative for dizziness, tremors, seizures, syncope, speech difficulty, weakness, light-headedness, numbness and headaches.   Psychiatric/Behavioral: Negative for behavioral problems, confusion, hallucinations and sleep disturbance. The patient is not nervous/anxious.    All other systems reviewed and are negative.      Physical Exam     Initial Vitals [09/09/18 0232]   BP Pulse Resp Temp SpO2   130/81 79 20 97.9 °F (36.6 °C) 96 %      MAP       --         Physical Exam    Nursing note and vitals reviewed.  Constitutional: Vital signs are normal. He appears well-developed and well-nourished. He is active.   HENT:   Head: Normocephalic and atraumatic.   Nose: Nose normal.   Mouth/Throat: Oropharynx is clear and moist.   Eyes: Conjunctivae, EOM and lids are normal. Pupils are equal, round, and reactive to light.   Neck: Trachea normal, normal range of motion and full passive range of motion without pain. Neck supple. Normal range of motion present. No neck rigidity.   Cardiovascular: Normal rate, regular rhythm, S1 normal, S2 normal, normal heart sounds, intact distal pulses and normal pulses.   Pulmonary/Chest: Effort normal and breath sounds normal. No respiratory distress.   Abdominal: Soft. Normal appearance and bowel sounds are normal. He exhibits no distension. There is tenderness in the right upper quadrant. There is guarding and positive  Godoy's sign. There is no rigidity, no rebound and no tenderness at McBurney's point.   Musculoskeletal: Normal range of motion.   Lymphadenopathy:     He has no cervical adenopathy.   Neurological: He is alert and oriented to person, place, and time. He has normal reflexes. No cranial nerve deficit or sensory deficit. GCS eye subscore is 4. GCS verbal subscore is 5. GCS motor subscore is 6.   Skin: Skin is warm and intact. Capillary refill takes less than 2 seconds. No abrasion, no bruising and no rash noted.   Psychiatric: He has a normal mood and affect. His speech is normal and behavior is normal. Judgment and thought content normal. He is not actively hallucinating. Cognition and memory are normal. He is attentive.         ED Course   Procedures  Labs Reviewed   COMPREHENSIVE METABOLIC PANEL   CBC W/ AUTO DIFFERENTIAL   LIPASE   AMYLASE   TROPONIN I   PROTIME-INR   APTT   URINALYSIS          Imaging Results    None          Medical Decision Making:   Initial Assessment:   Patient complains of right upper quadrant pain he started few hours ago.  No nausea or vomiting. No fever.  Differential Diagnosis:   Gastritis, GERD, peptic ulcer, pancreatitis, cholelithiasis, cholecystitis.  Food poisoning.  Clinical Tests:   Lab Tests: Ordered and Reviewed  Radiological Study: Ordered and Reviewed  ED Management:  Patient had normal white cell count, a CT scan showed possible early cholecystitis.  May be gallbladder sludge.  Patient is treated with IV antibiotics and pain medication.  Symptoms improved and patient discharged on antibiotics and pain medication.  He is advised to follow up with his primary care physician or to the ER if symptoms worsen like pain, fever, chills and rigors, nausea or vomiting.         Results discussed with patient.  Patient verbalizes understanding results,Diagnosis, treatment, management and prognosis.  Patient feels safe to go home.              Clinical Impression:   The encounter  diagnosis was Cholecystitis, acute.      Disposition:   Disposition: Discharged  Condition: Annemarie Lopez MD  09/15/18 [id number]

## 2018-09-19 ENCOUNTER — OFFICE VISIT (OUTPATIENT)
Dept: UROLOGY | Facility: CLINIC | Age: 48
End: 2018-09-19
Payer: COMMERCIAL

## 2018-09-19 VITALS — WEIGHT: 255 LBS | BODY MASS INDEX: 33.8 KG/M2 | HEIGHT: 73 IN

## 2018-09-19 DIAGNOSIS — R53.83 FATIGUE, UNSPECIFIED TYPE: ICD-10-CM

## 2018-09-19 DIAGNOSIS — R39.198 SLOW URINARY STREAM: ICD-10-CM

## 2018-09-19 DIAGNOSIS — R35.1 NOCTURIA: ICD-10-CM

## 2018-09-19 DIAGNOSIS — N40.1 BENIGN PROSTATIC HYPERPLASIA WITH URINARY FREQUENCY: ICD-10-CM

## 2018-09-19 DIAGNOSIS — R35.0 BENIGN PROSTATIC HYPERPLASIA WITH URINARY FREQUENCY: ICD-10-CM

## 2018-09-19 DIAGNOSIS — R79.89 LOW TESTOSTERONE IN MALE: Primary | ICD-10-CM

## 2018-09-19 DIAGNOSIS — N39.43 POST-VOID DRIBBLING: ICD-10-CM

## 2018-09-19 PROCEDURE — 99214 OFFICE O/P EST MOD 30 MIN: CPT | Mod: S$GLB,,, | Performed by: UROLOGY

## 2018-09-19 PROCEDURE — 3008F BODY MASS INDEX DOCD: CPT | Mod: CPTII,S$GLB,, | Performed by: UROLOGY

## 2018-09-19 PROCEDURE — 99999 PR PBB SHADOW E&M-EST. PATIENT-LVL III: CPT | Mod: PBBFAC,,, | Performed by: UROLOGY

## 2018-09-19 RX ORDER — TESTOSTERONE CYPIONATE 200 MG/ML
200 INJECTION, SOLUTION INTRAMUSCULAR
Qty: 10 ML | Refills: 1 | Status: SHIPPED | OUTPATIENT
Start: 2018-09-19 | End: 2024-03-01

## 2018-09-19 NOTE — PROGRESS NOTES
Subjective:       Patient ID: Esa Yang is a 48 y.o. male.    Chief Complaint: Low Testosterone    47 yo AAM with LOW T and Fatigue. Did not start T yet. BPH is stable on Flomax.      Benign Prostatic Hypertrophy   This is a chronic problem. The current episode started more than 1 year ago. The problem has been gradually improving since onset. Irritative symptoms include frequency (x5-6), nocturia (x 1-2) and urgency. Obstructive symptoms include a slower stream. Obstructive symptoms do not include dribbling, incomplete emptying, an intermittent stream, straining or a weak stream. Pertinent negatives include no chills, dysuria, genital pain, hematuria, hesitancy, nausea or vomiting. AUA score is 8-19. He is sexually active. Nothing aggravates the symptoms. Past treatments include tamsulosin. The treatment provided significant relief. He has been using treatment for 2 or more years.   Other   This is a chronic problem. The current episode started more than 1 year ago. The problem occurs constantly. The problem has been unchanged. Associated symptoms include fatigue. Pertinent negatives include no abdominal pain, anorexia, arthralgias, change in bowel habit, chest pain, chills, congestion, coughing, diaphoresis, fever, headaches, joint swelling, myalgias, nausea, neck pain, numbness, rash, sore throat, swollen glands, urinary symptoms, vertigo, visual change, vomiting or weakness. Nothing aggravates the symptoms. He has tried nothing for the symptoms. The treatment provided significant relief.     Review of Systems   Constitutional: Positive for fatigue. Negative for chills, diaphoresis and fever.   HENT: Negative for congestion and sore throat.    Respiratory: Negative for cough.    Cardiovascular: Negative for chest pain.   Gastrointestinal: Negative for abdominal pain, anorexia, change in bowel habit, nausea and vomiting.   Genitourinary: Positive for frequency (x5-6), nocturia (x 1-2) and urgency. Negative  for dysuria, hematuria, hesitancy and incomplete emptying.   Musculoskeletal: Negative for arthralgias, joint swelling, myalgias and neck pain.   Skin: Negative for rash.   Neurological: Negative for vertigo, weakness, numbness and headaches.       Objective:      Physical Exam   Nursing note and vitals reviewed.  Constitutional: He is oriented to person, place, and time. He appears well-developed and well-nourished.   HENT:   Head: Normocephalic.   Nose: Nose normal.   Mouth/Throat: Oropharynx is clear and moist.   Eyes: Conjunctivae and EOM are normal. Pupils are equal, round, and reactive to light.   Neck: Normal range of motion. Neck supple.   Cardiovascular: Normal rate, regular rhythm, normal heart sounds and intact distal pulses.    Pulmonary/Chest: Effort normal and breath sounds normal.   Abdominal: Soft. Bowel sounds are normal.   Genitourinary: Penis normal.   Musculoskeletal: Normal range of motion.   Neurological: He is alert and oriented to person, place, and time. He has normal reflexes.   Skin: Skin is warm and dry.     Psychiatric: He has a normal mood and affect. His behavior is normal. Judgment and thought content normal.       Assessment:       1. Low testosterone in male    2. Fatigue, unspecified type    3. Benign prostatic hyperplasia with urinary frequency    4. Nocturia    5. Post-void dribbling    6. Slow urinary stream        Plan:       Patient Instructions   Start T Cypionate 1 cc IM every 2 weeks  Check T level 1/2 way between 3rd and 4th shot.  F/U 6 month with PSA and T level

## 2018-09-19 NOTE — PATIENT INSTRUCTIONS
Start T Cypionate 1 cc IM every 2 weeks  Check T level 1/2 way between 3rd and 4th shot.  F/U 6 month with PSA and T level

## 2018-10-26 ENCOUNTER — LAB VISIT (OUTPATIENT)
Dept: LAB | Facility: HOSPITAL | Age: 48
End: 2018-10-26
Attending: UROLOGY
Payer: COMMERCIAL

## 2018-10-26 DIAGNOSIS — R53.83 FATIGUE, UNSPECIFIED TYPE: ICD-10-CM

## 2018-10-26 DIAGNOSIS — N39.43 POST-VOID DRIBBLING: ICD-10-CM

## 2018-10-26 DIAGNOSIS — R35.1 NOCTURIA: ICD-10-CM

## 2018-10-26 DIAGNOSIS — R35.0 BENIGN PROSTATIC HYPERPLASIA WITH URINARY FREQUENCY: ICD-10-CM

## 2018-10-26 DIAGNOSIS — N40.1 BENIGN PROSTATIC HYPERPLASIA WITH URINARY FREQUENCY: ICD-10-CM

## 2018-10-26 DIAGNOSIS — R39.198 SLOW URINARY STREAM: ICD-10-CM

## 2018-10-26 DIAGNOSIS — R79.89 LOW TESTOSTERONE IN MALE: ICD-10-CM

## 2018-10-26 LAB — TESTOST SERPL-MCNC: 628 NG/DL

## 2018-10-26 PROCEDURE — 84403 ASSAY OF TOTAL TESTOSTERONE: CPT | Mod: PO

## 2018-10-26 PROCEDURE — 36415 COLL VENOUS BLD VENIPUNCTURE: CPT | Mod: PO

## 2018-10-29 ENCOUNTER — TELEPHONE (OUTPATIENT)
Dept: UROLOGY | Facility: CLINIC | Age: 48
End: 2018-10-29

## 2019-01-11 ENCOUNTER — OFFICE VISIT (OUTPATIENT)
Dept: UROLOGY | Facility: CLINIC | Age: 49
End: 2019-01-11
Payer: COMMERCIAL

## 2019-01-11 VITALS — BODY MASS INDEX: 33.8 KG/M2 | WEIGHT: 255 LBS | HEIGHT: 73 IN

## 2019-01-11 DIAGNOSIS — N48.89 PENILE PAIN: ICD-10-CM

## 2019-01-11 DIAGNOSIS — R79.89 LOW TESTOSTERONE IN MALE: Primary | ICD-10-CM

## 2019-01-11 DIAGNOSIS — R53.83 FATIGUE, UNSPECIFIED TYPE: ICD-10-CM

## 2019-01-11 PROCEDURE — 3008F PR BODY MASS INDEX (BMI) DOCUMENTED: ICD-10-PCS | Mod: CPTII,S$GLB,, | Performed by: UROLOGY

## 2019-01-11 PROCEDURE — 99999 PR PBB SHADOW E&M-EST. PATIENT-LVL III: CPT | Mod: PBBFAC,,, | Performed by: UROLOGY

## 2019-01-11 PROCEDURE — 99215 OFFICE O/P EST HI 40 MIN: CPT | Mod: S$GLB,,, | Performed by: UROLOGY

## 2019-01-11 PROCEDURE — 99215 PR OFFICE/OUTPT VISIT, EST, LEVL V, 40-54 MIN: ICD-10-PCS | Mod: S$GLB,,, | Performed by: UROLOGY

## 2019-01-11 PROCEDURE — 99999 PR PBB SHADOW E&M-EST. PATIENT-LVL III: ICD-10-PCS | Mod: PBBFAC,,, | Performed by: UROLOGY

## 2019-01-11 PROCEDURE — 3008F BODY MASS INDEX DOCD: CPT | Mod: CPTII,S$GLB,, | Performed by: UROLOGY

## 2019-01-11 RX ORDER — TAMSULOSIN HYDROCHLORIDE 0.4 MG/1
0.4 CAPSULE ORAL DAILY
Qty: 30 CAPSULE | Refills: 11 | Status: SHIPPED | OUTPATIENT
Start: 2019-01-11 | End: 2024-03-26

## 2019-01-11 NOTE — PROGRESS NOTES
Subjective:       Patient ID: Esa Yang is a 48 y.o. male.    Chief Complaint: Low Testosterone    47 yo AAM with Low T , adverse reaction after several months with generalized aching, neuropathy, chest and side pain and severe ED with symptom resolution within 3 weeks of stopping T. Cypionate.        Other   This is a chronic (LOW T.) problem. The current episode started more than 1 year ago. The problem occurs constantly. The problem has been waxing and waning. Associated symptoms include fatigue and urinary symptoms. Pertinent negatives include no abdominal pain, anorexia, arthralgias, change in bowel habit, chest pain, chills, congestion, coughing, diaphoresis, fever, headaches, joint swelling, myalgias, nausea, neck pain, numbness, rash, sore throat, swollen glands, vertigo, visual change, vomiting or weakness. Nothing aggravates the symptoms. Treatments tried: TRT with T Cypionate. The treatment provided no relief (Worsening condition with meds.).     Review of Systems   Constitutional: Positive for fatigue. Negative for activity change, appetite change, chills, diaphoresis, fever and unexpected weight change.   HENT: Negative for congestion, hearing loss, sinus pressure, sore throat and trouble swallowing.    Eyes: Negative for photophobia, pain, discharge and visual disturbance.   Respiratory: Negative for apnea, cough and shortness of breath.    Cardiovascular: Negative for chest pain, palpitations and leg swelling.   Gastrointestinal: Negative for abdominal distention, abdominal pain, anal bleeding, anorexia, blood in stool, change in bowel habit, constipation, diarrhea, nausea, rectal pain and vomiting.   Endocrine: Negative for cold intolerance, heat intolerance, polydipsia, polyphagia and polyuria.   Genitourinary: Negative for decreased urine volume, difficulty urinating, discharge, dysuria, enuresis, flank pain, frequency, genital sores, hematuria, penile pain, penile swelling, scrotal  swelling, testicular pain and urgency.   Musculoskeletal: Negative for arthralgias, back pain, joint swelling, myalgias and neck pain.   Skin: Negative for color change, pallor, rash and wound.   Allergic/Immunologic: Negative for environmental allergies, food allergies and immunocompromised state.   Neurological: Negative for dizziness, vertigo, seizures, weakness, numbness and headaches.   Hematological: Negative for adenopathy. Does not bruise/bleed easily.   Psychiatric/Behavioral: Negative.        Objective:      Physical Exam   Nursing note and vitals reviewed.  Constitutional: He is oriented to person, place, and time. He appears well-developed and well-nourished.   HENT:   Head: Normocephalic.   Nose: Nose normal.   Mouth/Throat: Oropharynx is clear and moist.   Eyes: Conjunctivae and EOM are normal. Pupils are equal, round, and reactive to light.   Neck: Normal range of motion. Neck supple.   Cardiovascular: Normal rate, regular rhythm, normal heart sounds and intact distal pulses.    Pulmonary/Chest: Effort normal and breath sounds normal.   Abdominal: Soft. Bowel sounds are normal.   Genitourinary: Rectum normal, testes normal and penis normal. Prostate is enlarged. Prostate is not tender.   Musculoskeletal: Normal range of motion.   Neurological: He is alert and oriented to person, place, and time. He has normal reflexes.   Skin: Skin is warm and dry.     Psychiatric: He has a normal mood and affect. His behavior is normal. Judgment and thought content normal.       Assessment:       1. Low testosterone in male    2. Fatigue, unspecified type    3. Penile pain        Plan:           Patient Instructions   Check PSA, T and EST  Stop T Cyp injections  Start Tamsulosin  F/U 1 month  PEP Trial

## 2019-04-24 NOTE — IP AVS SNAPSHOT
[facility] [place] [facility] ([facility])  [address] [place], [place] [address]  Phone: [phone]           Patient Discharge Instructions   Our goal is to set you up for success. This packet includes information on your condition, medications, and your home care.  It will help you care for yourself to prevent having to return to the hospital.     Please ask your nurse if you have any questions.      There are many details to remember when preparing to leave the hospital. Here is what you will need to do:    1. Take your medicine. If you are prescribed medications, review your Medication List on the following pages. You may have new medications to  at the pharmacy and others that you'll need to stop taking. Review the instructions for how and when to take your medications. Talk with your doctor or nurses if you are unsure of what to do.     2. Go to your follow-up appointments. Specific follow-up information is listed in the following pages. Your may be contacted by a nurse or clinical provider about future appointments. Be sure we have all of the phone numbers to reach you. Please contact your provider's office if you are unable to make an appointment.     3. Watch for warning signs. Your doctor or nurse will give you detailed warning signs to watch for and when to call for assistance. These instructions may also include educational information about your condition. If you experience any of warning signs to your health, call your doctor.               ** Verify the list of medication(s) below is accurate and up to date. Carry this with you in case of emergency. If your medications have changed, please notify your healthcare provider.             Medication List      START taking these medications        Additional Info                      oxycodone-acetaminophen  mg per tablet   Commonly known as:  PERCOCET   Quantity:  40 tablet   Refills:  0   Dose:  1 tablet    Instructions:  Take 1 tablet by  Attempted to reach pt/daughter on home number. No answer. Message left to return call. Attempted to reach daughter Ananya Aviles (HIPAA Selena Simmonds). No answer. Message left requesting return call. mouth every 4 (four) hours as needed for Pain.     Begin Date    AM    Noon    PM    Bedtime         CONTINUE taking these medications        Additional Info                      omeprazole 40 MG capsule   Commonly known as:  PRILOSEC   Refills:  0   Dose:  40 mg    Instructions:  Take 40 mg by mouth once daily.     Begin Date    AM    Noon    PM    Bedtime            Where to Get Your Medications      You can get these medications from any pharmacy     Bring a paper prescription for each of these medications     oxycodone-acetaminophen  mg per tablet                  Please bring to all follow up appointments:    1. A copy of your discharge instructions.  2. All medicines you are currently taking in their original bottles.  3. Identification and insurance card.    Please arrive 15 minutes ahead of scheduled appointment time.    Please call 24 hours in advance if you must reschedule your appointment and/or time.        Follow-up Information     Follow up with Montana Russ Jr, MD. Schedule an appointment as soon as possible for a visit in 8 days.    Specialties:  Hand Surgery, Orthopedic Surgery    Why:  For wound re-check    Contact information:    [address]  [address]  [facility] [id number] [phone]          Discharge Instructions     Future Orders    Call MD for:  persistent nausea and vomiting     Call MD for:  severe uncontrolled pain     Call MD for:  temperature >100.4     Diet general     Questions:    Total calories:      Fat restriction, if any:      Protein restriction, if any:      Na restriction, if any:      Fluid restriction:      Additional restrictions:      Leave dressing on - Keep it clean, dry, and intact until clinic visit     Shower on day dressing removed (No bath)         Discharge Instructions         Anesthesia: After Your Surgery  Youve just had surgery. During surgery, you received medication called anesthesia to keep you comfortable and pain-free. After surgery, you may  experience some pain or nausea. This is common. Here are some tips for feeling better and recovering after surgery.    Going home  Your doctor or nurse will show you how to take care of yourself when you go home. He or she will also answer your questions. Have an adult family member or friend drive you home. For the first 24 hours after your surgery:  · Do not drive or use heavy equipment.  · Do not make important decisions or sign legal documents.  · Avoid alcohol.  · Have someone stay with you, if needed. He or she can watch for problems and help keep you safe.  Be sure to keep all follow-up appointments with your doctor. And rest after your procedure for as long as your doctor tells you to.    Coping with pain  If you have pain after surgery, pain medication will help you feel better. Take it as directed, before pain becomes severe. Also, ask your doctor or pharmacist about other ways to control pain, such as with heat, ice, and relaxation. And follow any other instructions your surgeon or nurse gives you.    Tips for taking pain medication  To get the best relief possible, remember these points:  · Pain medications can upset your stomach. Taking them with a little food may help.  · Most pain relievers taken by mouth need at least 20 to 30 minutes to take effect.  · Taking medication on a schedule can help you remember to take it. Try to time your medication so that you can take it before beginning an activity, such as dressing, walking, or sitting down for dinner.  · Constipation is a common side effect of pain medications. Contact your doctor before taking any medications like laxatives or stool softeners to help relieve constipation. Also ask about any dietary restrictions, because drinking lots of fluids and eating foods like fruits and vegetables that are high in fiber can also help. Remember, dont take laxatives unless your surgeon has prescribed them.  · Mixing alcohol and pain medication can cause  dizziness and slow your breathing. It can even be fatal. Dont drink alcohol while taking pain medication.  · Pain medication can slow your reflexes. Dont drive or operate machinery while taking pain medication.  If your health care provider tells you to take acetaminophen to help relieve your pain, ask him or her how much you are supposed to take each day. (Acetaminophen is the generic name for Tylenol and other brand-name pain relievers.) Acetaminophen or other pain relievers may interact with your prescription medicines or other over-the-counter (OTC) drugs. Some prescription medications contain acetaminophen along with other active ingredients. Using both prescription and OTC acetaminophen for pain can cause you to overdose. The FDA recommends that you read the labels on your OTC medications carefully. This will help you to clearly understand the list of active ingredients, dosing instructions, and any warnings. It may also help you avoid taking too much acetaminophen. If you have questions or don't understand the information, ask your pharmacist or health care provider to explain it to you before you take the OTC medication.    Managing nausea  Some people have an upset stomach after surgery. This is often due to anesthesia, pain, pain medications, or the stress of surgery. The following tips will help you manage nausea and get good nutrition as you recover. If you were on a special diet before surgery, ask your doctor if you should follow it during recovery. These tips may help:  · Dont push yourself to eat. Your body will tell you when to eat and how much.  · Start off with clear liquids and soup. They are easier to digest.  · Progress to semi-solid foods (mashed potatoes, applesauce, and gelatin) as you feel ready.  · Slowly move to solid foods. Dont eat fatty, rich, or spicy foods at first.  · Dont force yourself to have three large meals a day. Instead, eat smaller amounts more often.  · Take pain  medications with a small amount of solid food, such as crackers or toast to avoid nausea.      Call your surgeon if  · You still have pain an hour after taking medication (it may not be strong enough).  · You feel too sleepy, dizzy, or groggy (medication may be too strong).  · You have side effects like nausea, vomiting, or skin changes (rash, itching, or hives).   © 9258-9601 [employer]. [address] [place] [address]. All rights reserved. This information is not intended as a substitute for professional medical care. Always follow your healthcare professional's instructions.                      Primary Diagnosis     Your primary diagnosis was:  Rupture Of Left Distal Biceps Tendon      Admission Information     Date & Time Provider Department CSN    4/12/2017  5:57 AM Montana Russ Jr., MD [facility] [id number]      Care Providers     Provider Role Specialty Primary office phone    Montana Russ Jr., MD Attending Provider Hand Surgery [phone]    Montana Russ Jr., MD Surgeon  Hand Surgery [phone]      Your Vitals Were     BP Pulse Temp Resp Height Weight    159/108 76 98 °F (36.7 °C) 16 6' (1.829 m) 115.7 kg (255 lb)    SpO2 BMI             98% 34.58 kg/m2         Recent Lab Values        11/14/2014                           9:00 AM           A1C 6.2                       Allergies as of 4/12/2017        Reactions    Sulfa (Sulfonamide Antibiotics) Swelling    Neosporin (Neomycin-polymyx) Rash    Topical irritation     Aspirin Swelling    Latex, Natural Rubber Hives, Itching    Shellfish Containing Products Swelling      [facility]     [facility] Nurse Care Line - 24/7 Assistance  Unless otherwise directed by your provider, please contact [facility], our nurse care line that is available for 24/7 assistance.     Registered nurses in the [facility] provide clinical advisement, health education, appointment booking, and other  advisory services.  Call for this free service at [phone].        Advance Directives     An advance directive is a document which, in the event you are no longer able to make decisions for yourself, tells your healthcare team what kind of treatment you do or do not want to receive, or who you would like to make those decisions for you.  If you do not currently have an advance directive, [facility] encourages you to create one.  For more information call:  [phone]), [phone] ([phone]),  or log on to [email or web address].        Language Assistance Services     ATTENTION: Language assistance services are available, free of charge. Please call [phone].      ATENCIÓN: Si habla español, tiene a glover disposición servicios gratuitos de asistencia lingüística. Llame al [phone].     CHÚ Ý: N?u b?n nói Ti?ng Vi?t, có các d?ch v? h? tr? ngôn ng? mi?n phí dành cho b?n. G?i s? [phone].        [facility] Sign-Up     Activating your [facility] account is as easy as 1-2-3!     1) Visit [email or web address], select Sign Up Now, enter this activation code and your date of birth, then select Next.  [email or web address]  Expires: 5/18/2017  9:29 AM      2) Create a username and password to use when you visit [facility] in the future and select a security question in case you lose your password and select Next.    3) Enter your e-mail address and click Sign Up!    Additional Information  If you have questions, please e-mail [email or web address] or call [phone] to talk to our [facility] staff. Remember, [facility] is NOT to be used for urgent needs. For medical emergencies, dial 911.          [facility] complies with applicable Federal civil rights laws and does not discriminate on the basis of race, color, national origin, age, disability, or sex.

## 2020-02-04 ENCOUNTER — TELEPHONE (OUTPATIENT)
Dept: UROLOGY | Facility: CLINIC | Age: 50
End: 2020-02-04

## 2020-02-04 NOTE — TELEPHONE ENCOUNTER
----- Message from Brianna Covington sent at 2/4/2020 10:37 AM CST -----  Contact: Lorraine Blackman - [phone]  Needs a call back on medication PGE . Please advise

## 2020-02-20 ENCOUNTER — LAB VISIT (OUTPATIENT)
Dept: LAB | Facility: HOSPITAL | Age: 50
End: 2020-02-20
Attending: UROLOGY
Payer: COMMERCIAL

## 2020-02-20 DIAGNOSIS — N40.1 BENIGN PROSTATIC HYPERPLASIA WITH LOWER URINARY TRACT SYMPTOMS: Primary | ICD-10-CM

## 2020-02-20 LAB — COMPLEXED PSA SERPL-MCNC: 0.37 NG/ML (ref 0–4)

## 2020-02-20 PROCEDURE — 84153 ASSAY OF PSA TOTAL: CPT

## 2020-02-20 PROCEDURE — 36415 COLL VENOUS BLD VENIPUNCTURE: CPT | Mod: PO

## 2021-02-11 ENCOUNTER — TELEPHONE (OUTPATIENT)
Dept: SPORTS MEDICINE | Facility: CLINIC | Age: 51
End: 2021-02-11

## 2021-02-12 ENCOUNTER — OFFICE VISIT (OUTPATIENT)
Dept: SPORTS MEDICINE | Facility: CLINIC | Age: 51
End: 2021-02-12
Payer: COMMERCIAL

## 2021-02-12 ENCOUNTER — HOSPITAL ENCOUNTER (OUTPATIENT)
Dept: RADIOLOGY | Facility: HOSPITAL | Age: 51
Discharge: HOME | End: 2021-02-12
Attending: PHYSICIAN ASSISTANT
Payer: COMMERCIAL

## 2021-02-12 VITALS
SYSTOLIC BLOOD PRESSURE: 143 MMHG | WEIGHT: 280 LBS | HEART RATE: 89 BPM | HEIGHT: 73 IN | DIASTOLIC BLOOD PRESSURE: 85 MMHG | BODY MASS INDEX: 37.11 KG/M2

## 2021-02-12 DIAGNOSIS — M94.261 CHONDROMALACIA, KNEE, RIGHT: ICD-10-CM

## 2021-02-12 DIAGNOSIS — M25.561 RIGHT KNEE PAIN, UNSPECIFIED CHRONICITY: Primary | ICD-10-CM

## 2021-02-12 DIAGNOSIS — M25.561 RIGHT KNEE PAIN, UNSPECIFIED CHRONICITY: ICD-10-CM

## 2021-02-12 DIAGNOSIS — S83.241A ACUTE MEDIAL MENISCUS TEAR OF RIGHT KNEE, INITIAL ENCOUNTER: ICD-10-CM

## 2021-02-12 PROCEDURE — 99204 PR OFFICE/OUTPT VISIT, NEW, LEVL IV, 45-59 MIN: ICD-10-PCS | Mod: S$GLB,,, | Performed by: PHYSICIAN ASSISTANT

## 2021-02-12 PROCEDURE — 1125F AMNT PAIN NOTED PAIN PRSNT: CPT | Mod: S$GLB,,, | Performed by: PHYSICIAN ASSISTANT

## 2021-02-12 PROCEDURE — 99204 OFFICE O/P NEW MOD 45 MIN: CPT | Mod: S$GLB,,, | Performed by: PHYSICIAN ASSISTANT

## 2021-02-12 PROCEDURE — 73564 XR KNEE ORTHO BILAT WITH FLEXION: ICD-10-PCS | Mod: 26,,, | Performed by: RADIOLOGY

## 2021-02-12 PROCEDURE — 99999 PR PBB SHADOW E&M-EST. PATIENT-LVL III: CPT | Mod: PBBFAC,,, | Performed by: PHYSICIAN ASSISTANT

## 2021-02-12 PROCEDURE — 1125F PR PAIN SEVERITY QUANTIFIED, PAIN PRESENT: ICD-10-PCS | Mod: S$GLB,,, | Performed by: PHYSICIAN ASSISTANT

## 2021-02-12 PROCEDURE — 3008F BODY MASS INDEX DOCD: CPT | Mod: CPTII,S$GLB,, | Performed by: PHYSICIAN ASSISTANT

## 2021-02-12 PROCEDURE — 3008F PR BODY MASS INDEX (BMI) DOCUMENTED: ICD-10-PCS | Mod: CPTII,S$GLB,, | Performed by: PHYSICIAN ASSISTANT

## 2021-02-12 PROCEDURE — 73564 X-RAY EXAM KNEE 4 OR MORE: CPT | Mod: TC,50

## 2021-02-12 PROCEDURE — 73564 X-RAY EXAM KNEE 4 OR MORE: CPT | Mod: 26,,, | Performed by: RADIOLOGY

## 2021-02-12 PROCEDURE — 99999 PR PBB SHADOW E&M-EST. PATIENT-LVL III: ICD-10-PCS | Mod: PBBFAC,,, | Performed by: PHYSICIAN ASSISTANT

## 2021-02-12 RX ORDER — MELOXICAM 15 MG/1
15 TABLET ORAL DAILY
Qty: 30 TABLET | Refills: 0 | Status: SHIPPED | OUTPATIENT
Start: 2021-02-12 | End: 2021-03-12

## 2021-02-19 ENCOUNTER — HOSPITAL ENCOUNTER (OUTPATIENT)
Dept: RADIOLOGY | Facility: HOSPITAL | Age: 51
Discharge: HOME | End: 2021-02-19
Attending: PHYSICIAN ASSISTANT
Payer: COMMERCIAL

## 2021-02-19 DIAGNOSIS — M25.561 RIGHT KNEE PAIN, UNSPECIFIED CHRONICITY: ICD-10-CM

## 2021-02-19 DIAGNOSIS — M94.261 CHONDROMALACIA, KNEE, RIGHT: ICD-10-CM

## 2021-02-19 DIAGNOSIS — S83.241A ACUTE MEDIAL MENISCUS TEAR OF RIGHT KNEE, INITIAL ENCOUNTER: ICD-10-CM

## 2021-02-19 PROCEDURE — 73721 MRI KNEE WITHOUT CONTRAST RIGHT: ICD-10-PCS | Mod: 26,RT,, | Performed by: RADIOLOGY

## 2021-02-19 PROCEDURE — 73721 MRI JNT OF LWR EXTRE W/O DYE: CPT | Mod: 26,RT,, | Performed by: RADIOLOGY

## 2021-02-19 PROCEDURE — 73721 MRI JNT OF LWR EXTRE W/O DYE: CPT | Mod: TC,RT

## 2021-03-11 ENCOUNTER — OFFICE VISIT (OUTPATIENT)
Dept: SPORTS MEDICINE | Facility: CLINIC | Age: 51
End: 2021-03-11
Payer: COMMERCIAL

## 2021-03-11 VITALS
HEART RATE: 83 BPM | BODY MASS INDEX: 37.11 KG/M2 | DIASTOLIC BLOOD PRESSURE: 76 MMHG | SYSTOLIC BLOOD PRESSURE: 118 MMHG | HEIGHT: 73 IN | WEIGHT: 280 LBS

## 2021-03-11 DIAGNOSIS — M17.0 OSTEOARTHRITIS OF BOTH KNEES, UNSPECIFIED OSTEOARTHRITIS TYPE: Primary | ICD-10-CM

## 2021-03-11 LAB — CRC RECOMMENDATION EXT: NORMAL

## 2021-03-11 PROCEDURE — 99214 PR OFFICE/OUTPT VISIT, EST, LEVL IV, 30-39 MIN: ICD-10-PCS | Mod: S$GLB,,, | Performed by: ORTHOPAEDIC SURGERY

## 2021-03-11 PROCEDURE — 99214 OFFICE O/P EST MOD 30 MIN: CPT | Mod: S$GLB,,, | Performed by: ORTHOPAEDIC SURGERY

## 2021-03-11 PROCEDURE — 3008F PR BODY MASS INDEX (BMI) DOCUMENTED: ICD-10-PCS | Mod: CPTII,S$GLB,, | Performed by: ORTHOPAEDIC SURGERY

## 2021-03-11 PROCEDURE — 1126F PR PAIN SEVERITY QUANTIFIED, NO PAIN PRESENT: ICD-10-PCS | Mod: S$GLB,,, | Performed by: ORTHOPAEDIC SURGERY

## 2021-03-11 PROCEDURE — 3008F BODY MASS INDEX DOCD: CPT | Mod: CPTII,S$GLB,, | Performed by: ORTHOPAEDIC SURGERY

## 2021-03-11 PROCEDURE — 99999 PR PBB SHADOW E&M-EST. PATIENT-LVL III: ICD-10-PCS | Mod: PBBFAC,,, | Performed by: ORTHOPAEDIC SURGERY

## 2021-03-11 PROCEDURE — 1126F AMNT PAIN NOTED NONE PRSNT: CPT | Mod: S$GLB,,, | Performed by: ORTHOPAEDIC SURGERY

## 2021-03-11 PROCEDURE — 99999 PR PBB SHADOW E&M-EST. PATIENT-LVL III: CPT | Mod: PBBFAC,,, | Performed by: ORTHOPAEDIC SURGERY

## 2021-04-12 DIAGNOSIS — R06.02 SHORTNESS OF BREATH: ICD-10-CM

## 2021-04-12 DIAGNOSIS — R20.2 PARESTHESIA: Primary | ICD-10-CM

## 2021-04-12 DIAGNOSIS — M79.605 LEFT LEG PAIN: ICD-10-CM

## 2021-06-14 ENCOUNTER — HOSPITAL ENCOUNTER (EMERGENCY)
Facility: HOSPITAL | Age: 51
Discharge: HOME | End: 2021-06-14
Attending: EMERGENCY MEDICINE
Payer: COMMERCIAL

## 2021-06-14 VITALS
HEIGHT: 72 IN | WEIGHT: 265 LBS | TEMPERATURE: 99 F | DIASTOLIC BLOOD PRESSURE: 78 MMHG | BODY MASS INDEX: 35.89 KG/M2 | SYSTOLIC BLOOD PRESSURE: 122 MMHG | OXYGEN SATURATION: 97 % | HEART RATE: 78 BPM | RESPIRATION RATE: 19 BRPM

## 2021-06-14 DIAGNOSIS — J06.9 VIRAL UPPER RESPIRATORY INFECTION: Primary | ICD-10-CM

## 2021-06-14 DIAGNOSIS — R22.0 MOUTH SWELLING: ICD-10-CM

## 2021-06-14 LAB — SARS-COV-2 RDRP RESP QL NAA+PROBE: NEGATIVE

## 2021-06-14 PROCEDURE — 99284 EMERGENCY DEPT VISIT MOD MDM: CPT | Mod: ER

## 2021-06-14 PROCEDURE — U0002 COVID-19 LAB TEST NON-CDC: HCPCS | Mod: ER | Performed by: PHYSICIAN ASSISTANT

## 2021-06-14 RX ORDER — CLINDAMYCIN HYDROCHLORIDE 150 MG/1
300 CAPSULE ORAL EVERY 8 HOURS
Qty: 56 CAPSULE | Refills: 0 | Status: SHIPPED | OUTPATIENT
Start: 2021-06-14 | End: 2021-06-21

## 2021-06-14 RX ORDER — BENZONATATE 100 MG/1
100 CAPSULE ORAL 3 TIMES DAILY PRN
Qty: 20 CAPSULE | Refills: 0 | Status: SHIPPED | OUTPATIENT
Start: 2021-06-14 | End: 2021-06-24

## 2021-07-06 ENCOUNTER — LAB VISIT (OUTPATIENT)
Dept: LAB | Facility: HOSPITAL | Age: 51
End: 2021-07-06
Attending: UROLOGY
Payer: COMMERCIAL

## 2021-07-06 DIAGNOSIS — N40.1 BENIGN PROSTATIC HYPERPLASIA WITH LOWER URINARY TRACT SYMPTOMS: Primary | ICD-10-CM

## 2021-07-06 LAB — COMPLEXED PSA SERPL-MCNC: 0.48 NG/ML (ref 0–4)

## 2021-07-06 PROCEDURE — 36415 COLL VENOUS BLD VENIPUNCTURE: CPT | Mod: PO | Performed by: UROLOGY

## 2021-07-06 PROCEDURE — 84153 ASSAY OF PSA TOTAL: CPT | Performed by: UROLOGY

## 2021-09-27 ENCOUNTER — TELEPHONE (OUTPATIENT)
Dept: ORTHOPEDICS | Facility: CLINIC | Age: 51
End: 2021-09-27

## 2021-10-18 ENCOUNTER — TELEPHONE (OUTPATIENT)
Dept: ORTHOPEDICS | Facility: CLINIC | Age: 51
End: 2021-10-18

## 2021-10-18 ENCOUNTER — HOSPITAL ENCOUNTER (OUTPATIENT)
Dept: RADIOLOGY | Facility: HOSPITAL | Age: 51
Discharge: HOME | End: 2021-10-18
Attending: ORTHOPAEDIC SURGERY
Payer: COMMERCIAL

## 2021-10-18 ENCOUNTER — OFFICE VISIT (OUTPATIENT)
Dept: ORTHOPEDICS | Facility: CLINIC | Age: 51
End: 2021-10-18
Payer: COMMERCIAL

## 2021-10-18 VITALS — HEIGHT: 72 IN | BODY MASS INDEX: 35.89 KG/M2 | WEIGHT: 265 LBS

## 2021-10-18 DIAGNOSIS — M25.512 LEFT SHOULDER PAIN, UNSPECIFIED CHRONICITY: ICD-10-CM

## 2021-10-18 DIAGNOSIS — M25.512 LEFT SHOULDER PAIN, UNSPECIFIED CHRONICITY: Primary | ICD-10-CM

## 2021-10-18 DIAGNOSIS — M65.331 TRIGGER MIDDLE FINGER OF RIGHT HAND: ICD-10-CM

## 2021-10-18 PROBLEM — S46.212A RUPTURE OF LEFT DISTAL BICEPS TENDON: Status: RESOLVED | Noted: 2017-04-03 | Resolved: 2021-10-18

## 2021-10-18 PROCEDURE — 1159F MED LIST DOCD IN RCRD: CPT | Mod: CPTII,S$GLB,, | Performed by: ORTHOPAEDIC SURGERY

## 2021-10-18 PROCEDURE — 3008F PR BODY MASS INDEX (BMI) DOCUMENTED: ICD-10-PCS | Mod: CPTII,S$GLB,, | Performed by: ORTHOPAEDIC SURGERY

## 2021-10-18 PROCEDURE — 3008F BODY MASS INDEX DOCD: CPT | Mod: CPTII,S$GLB,, | Performed by: ORTHOPAEDIC SURGERY

## 2021-10-18 PROCEDURE — 99203 PR OFFICE/OUTPT VISIT, NEW, LEVL III, 30-44 MIN: ICD-10-PCS | Mod: 25,S$GLB,, | Performed by: ORTHOPAEDIC SURGERY

## 2021-10-18 PROCEDURE — 99999 PR PBB SHADOW E&M-EST. PATIENT-LVL II: CPT | Mod: PBBFAC,,, | Performed by: ORTHOPAEDIC SURGERY

## 2021-10-18 PROCEDURE — 73130 XR HAND COMPLETE 3 VIEW RIGHT: ICD-10-PCS | Mod: 26,RT,, | Performed by: RADIOLOGY

## 2021-10-18 PROCEDURE — 73130 X-RAY EXAM OF HAND: CPT | Mod: TC,PN,RT

## 2021-10-18 PROCEDURE — 20550 NJX 1 TENDON SHEATH/LIGAMENT: CPT | Mod: 59,51,F7,S$GLB | Performed by: ORTHOPAEDIC SURGERY

## 2021-10-18 PROCEDURE — 73030 XR SHOULDER COMPLETE 2 OR MORE VIEWS LEFT: ICD-10-PCS | Mod: 26,LT,, | Performed by: RADIOLOGY

## 2021-10-18 PROCEDURE — 20610 DRAIN/INJ JOINT/BURSA W/O US: CPT | Mod: LT,S$GLB,, | Performed by: ORTHOPAEDIC SURGERY

## 2021-10-18 PROCEDURE — 73030 X-RAY EXAM OF SHOULDER: CPT | Mod: 26,LT,, | Performed by: RADIOLOGY

## 2021-10-18 PROCEDURE — 20610 PR DRAIN/INJECT LARGE JOINT/BURSA: ICD-10-PCS | Mod: LT,S$GLB,, | Performed by: ORTHOPAEDIC SURGERY

## 2021-10-18 PROCEDURE — 1159F PR MEDICATION LIST DOCUMENTED IN MEDICAL RECORD: ICD-10-PCS | Mod: CPTII,S$GLB,, | Performed by: ORTHOPAEDIC SURGERY

## 2021-10-18 PROCEDURE — 99203 OFFICE O/P NEW LOW 30 MIN: CPT | Mod: 25,S$GLB,, | Performed by: ORTHOPAEDIC SURGERY

## 2021-10-18 PROCEDURE — 20550 PR INJECT TENDON SHEATH/LIGAMENT: ICD-10-PCS | Mod: 59,51,F7,S$GLB | Performed by: ORTHOPAEDIC SURGERY

## 2021-10-18 PROCEDURE — 73130 X-RAY EXAM OF HAND: CPT | Mod: 26,RT,, | Performed by: RADIOLOGY

## 2021-10-18 PROCEDURE — 99999 PR PBB SHADOW E&M-EST. PATIENT-LVL II: ICD-10-PCS | Mod: PBBFAC,,, | Performed by: ORTHOPAEDIC SURGERY

## 2021-10-18 PROCEDURE — 73030 X-RAY EXAM OF SHOULDER: CPT | Mod: TC,PN,LT

## 2021-10-18 RX ORDER — TRIAMCINOLONE ACETONIDE 40 MG/ML
40 INJECTION, SUSPENSION INTRA-ARTICULAR; INTRAMUSCULAR
Status: COMPLETED | OUTPATIENT
Start: 2021-10-18 | End: 2021-10-18

## 2021-10-18 RX ORDER — TRIAMCINOLONE ACETONIDE 40 MG/ML
20 INJECTION, SUSPENSION INTRA-ARTICULAR; INTRAMUSCULAR
Status: COMPLETED | OUTPATIENT
Start: 2021-10-18 | End: 2021-10-18

## 2021-10-18 RX ADMIN — TRIAMCINOLONE ACETONIDE 40 MG: 40 INJECTION, SUSPENSION INTRA-ARTICULAR; INTRAMUSCULAR at 10:10

## 2021-10-18 RX ADMIN — TRIAMCINOLONE ACETONIDE 20 MG: 40 INJECTION, SUSPENSION INTRA-ARTICULAR; INTRAMUSCULAR at 10:10

## 2021-11-17 ENCOUNTER — TELEPHONE (OUTPATIENT)
Dept: SPORTS MEDICINE | Facility: CLINIC | Age: 51
End: 2021-11-17
Payer: COMMERCIAL

## 2021-11-29 ENCOUNTER — OFFICE VISIT (OUTPATIENT)
Dept: ORTHOPEDICS | Facility: CLINIC | Age: 51
End: 2021-11-29
Payer: COMMERCIAL

## 2021-11-29 VITALS
BODY MASS INDEX: 35.89 KG/M2 | WEIGHT: 265 LBS | HEIGHT: 72 IN | SYSTOLIC BLOOD PRESSURE: 145 MMHG | DIASTOLIC BLOOD PRESSURE: 87 MMHG | HEART RATE: 73 BPM

## 2021-11-29 DIAGNOSIS — M25.519 SHOULDER PAIN, UNSPECIFIED CHRONICITY, UNSPECIFIED LATERALITY: ICD-10-CM

## 2021-11-29 DIAGNOSIS — M75.112 NONTRAUMATIC INCOMPLETE TEAR OF LEFT ROTATOR CUFF: ICD-10-CM

## 2021-11-29 PROCEDURE — 99213 OFFICE O/P EST LOW 20 MIN: CPT | Mod: S$GLB,,, | Performed by: ORTHOPAEDIC SURGERY

## 2021-11-29 PROCEDURE — 99999 PR PBB SHADOW E&M-EST. PATIENT-LVL III: CPT | Mod: PBBFAC,,, | Performed by: ORTHOPAEDIC SURGERY

## 2021-11-29 PROCEDURE — 99999 PR PBB SHADOW E&M-EST. PATIENT-LVL III: ICD-10-PCS | Mod: PBBFAC,,, | Performed by: ORTHOPAEDIC SURGERY

## 2021-11-29 PROCEDURE — 99213 PR OFFICE/OUTPT VISIT, EST, LEVL III, 20-29 MIN: ICD-10-PCS | Mod: S$GLB,,, | Performed by: ORTHOPAEDIC SURGERY

## 2021-12-07 ENCOUNTER — OFFICE VISIT (OUTPATIENT)
Dept: SPORTS MEDICINE | Facility: CLINIC | Age: 51
End: 2021-12-07
Payer: COMMERCIAL

## 2021-12-07 VITALS
DIASTOLIC BLOOD PRESSURE: 97 MMHG | HEIGHT: 72 IN | WEIGHT: 279 LBS | BODY MASS INDEX: 37.79 KG/M2 | SYSTOLIC BLOOD PRESSURE: 141 MMHG | HEART RATE: 63 BPM

## 2021-12-07 DIAGNOSIS — M17.0 OSTEOARTHRITIS OF BOTH KNEES, UNSPECIFIED OSTEOARTHRITIS TYPE: Primary | ICD-10-CM

## 2021-12-07 PROCEDURE — 99214 OFFICE O/P EST MOD 30 MIN: CPT | Mod: S$GLB,,, | Performed by: ORTHOPAEDIC SURGERY

## 2021-12-07 PROCEDURE — 99999 PR PBB SHADOW E&M-EST. PATIENT-LVL III: ICD-10-PCS | Mod: PBBFAC,,, | Performed by: ORTHOPAEDIC SURGERY

## 2021-12-07 PROCEDURE — 99999 PR PBB SHADOW E&M-EST. PATIENT-LVL III: CPT | Mod: PBBFAC,,, | Performed by: ORTHOPAEDIC SURGERY

## 2021-12-07 PROCEDURE — 99214 PR OFFICE/OUTPT VISIT, EST, LEVL IV, 30-39 MIN: ICD-10-PCS | Mod: S$GLB,,, | Performed by: ORTHOPAEDIC SURGERY

## 2021-12-10 ENCOUNTER — TELEPHONE (OUTPATIENT)
Dept: ORTHOPEDICS | Facility: CLINIC | Age: 51
End: 2021-12-10
Payer: COMMERCIAL

## 2022-01-19 ENCOUNTER — LAB VISIT (OUTPATIENT)
Dept: LAB | Facility: HOSPITAL | Age: 52
End: 2022-01-19
Attending: SPECIALIST
Payer: COMMERCIAL

## 2022-01-19 DIAGNOSIS — Z80.42 FAMILY HISTORY OF MALIGNANT NEOPLASM OF PROSTATE: ICD-10-CM

## 2022-01-19 DIAGNOSIS — E29.1 3-OXO-5 ALPHA-STEROID DELTA 4-DEHYDROGENASE DEFICIENCY: ICD-10-CM

## 2022-01-19 DIAGNOSIS — N40.1 ENLARGED PROSTATE WITH URINARY OBSTRUCTION: Primary | ICD-10-CM

## 2022-01-19 DIAGNOSIS — N52.9 IMPOTENCE OF ORGANIC ORIGIN: ICD-10-CM

## 2022-01-19 DIAGNOSIS — N13.8 ENLARGED PROSTATE WITH URINARY OBSTRUCTION: Primary | ICD-10-CM

## 2022-01-19 LAB
COMPLEXED PSA SERPL-MCNC: 1.1 NG/ML (ref 0–4)
TESTOST SERPL-MCNC: 389 NG/DL (ref 304–1227)

## 2022-01-19 PROCEDURE — 84153 ASSAY OF PSA TOTAL: CPT | Performed by: SPECIALIST

## 2022-01-19 PROCEDURE — 84403 ASSAY OF TOTAL TESTOSTERONE: CPT | Mod: PO | Performed by: SPECIALIST

## 2022-01-19 PROCEDURE — 36415 COLL VENOUS BLD VENIPUNCTURE: CPT | Mod: PO | Performed by: SPECIALIST

## 2022-01-19 PROCEDURE — 84402 ASSAY OF FREE TESTOSTERONE: CPT | Mod: PO | Performed by: SPECIALIST

## 2022-01-20 ENCOUNTER — TELEPHONE (OUTPATIENT)
Dept: ORTHOPEDICS | Facility: CLINIC | Age: 52
End: 2022-01-20
Payer: COMMERCIAL

## 2022-01-20 NOTE — TELEPHONE ENCOUNTER
----- Message from Rossana Yo sent at 1/20/2022  2:02 PM CST -----  Regarding: sooner  Contact: [phone]  Type:  Sooner Apoointment Request    Caller is requesting a sooner appointment.  Caller declined first available appointment listed below.  Caller will not accept being placed on the waitlist and is requesting a message be sent to doctor.  Name of Caller: self   When is the first available appointment? 02/17  Symptoms: MRI results. He has the CD  Would the patient rather a call back or a response via [facility]?  call  Best Call Back Number: [phone]  Additional Information:

## 2022-01-21 LAB — TESTOST FREE SERPL-MCNC: 10.5 PG/ML

## 2022-01-24 ENCOUNTER — OFFICE VISIT (OUTPATIENT)
Dept: ORTHOPEDICS | Facility: CLINIC | Age: 52
End: 2022-01-24
Payer: COMMERCIAL

## 2022-01-24 DIAGNOSIS — M75.112 NONTRAUMATIC INCOMPLETE TEAR OF LEFT ROTATOR CUFF: Primary | ICD-10-CM

## 2022-01-24 PROCEDURE — 99213 OFFICE O/P EST LOW 20 MIN: CPT | Mod: 95,,, | Performed by: ORTHOPAEDIC SURGERY

## 2022-01-24 PROCEDURE — 99213 PR OFFICE/OUTPT VISIT, EST, LEVL III, 20-29 MIN: ICD-10-PCS | Mod: 95,,, | Performed by: ORTHOPAEDIC SURGERY

## 2022-01-24 RX ORDER — DIAZEPAM 5 MG/1
5 TABLET ORAL EVERY 6 HOURS PRN
Qty: 4 TABLET | Refills: 0 | Status: SHIPPED | OUTPATIENT
Start: 2022-01-24 | End: 2024-03-01

## 2022-01-24 NOTE — PROGRESS NOTES
Established Patient - Audio Only Telehealth Visit     The patient location is:  At home  The chief complaint leading to consultation is:  Left shoulder pain  Visit type: Virtual visit with audio only (telephone)  Total time spent with patient:  10 minutes       The reason for the audio only service rather than synchronous audio and video virtual visit was related to technical difficulties or patient preference/necessity.     Each patient to whom I provide medical services by telemedicine is:  (1) informed of the relationship between the physician and patient and the respective role of any other health care provider with respect to management of the patient; and (2) notified that they may decline to receive medical services by telemedicine and may withdraw from such care at any time. Patient verbally consented to receive this service via voice-only telephone call.       HPI:  52-year-old male with ongoing symptoms left shoulder of 6 months duration       Assessment and plan:  Recent MRI scan shows significant impingement of the rotator cuff with possible partial tear and spurring off of the AC joint  Degenerative changes of the shoulder joint itself were also noted  Went over these findings with the patient on the phone today  He would like to co repair  The patient would like to consider possible surgery for the left shoulder  I do not think his shoulder arthritis is bad enough to need a shoulder replacement but we might consider a shoulder arthroscopy subacromial decompression and possible rotator cuff repair  In the meantime avoid overhead lifting  Follow-up to possibly set up surgery left shoulder                        This service was not originating from a related E/M service provided within the previous 7 days nor will  to an E/M service or procedure within the next 24 hours or my soonest available appointment.  Prevailing standard of care was able to be met in this audio-only visit.

## 2022-04-27 ENCOUNTER — HOSPITAL ENCOUNTER (EMERGENCY)
Facility: HOSPITAL | Age: 52
Discharge: HOME | End: 2022-04-27
Attending: EMERGENCY MEDICINE
Payer: COMMERCIAL

## 2022-04-27 VITALS
HEIGHT: 72 IN | WEIGHT: 265 LBS | RESPIRATION RATE: 20 BRPM | SYSTOLIC BLOOD PRESSURE: 130 MMHG | BODY MASS INDEX: 35.89 KG/M2 | TEMPERATURE: 98 F | DIASTOLIC BLOOD PRESSURE: 70 MMHG | HEART RATE: 81 BPM | OXYGEN SATURATION: 98 %

## 2022-04-27 DIAGNOSIS — H16.133 WELDERS' FLASH, BILATERAL: Primary | ICD-10-CM

## 2022-04-27 PROCEDURE — 99284 EMERGENCY DEPT VISIT MOD MDM: CPT | Mod: ER

## 2022-04-27 PROCEDURE — 25000003 PHARM REV CODE 250: Mod: ER | Performed by: EMERGENCY MEDICINE

## 2022-04-27 RX ORDER — FLUORESCEIN SODIUM AND BENOXINATE HYDROCHLORIDE 2.6; 4.4 MG/ML; MG/ML
2 SOLUTION/ DROPS OPHTHALMIC
Status: COMPLETED | OUTPATIENT
Start: 2022-04-27 | End: 2022-04-27

## 2022-04-27 RX ORDER — HYDROCODONE BITARTRATE AND ACETAMINOPHEN 5; 325 MG/1; MG/1
1 TABLET ORAL EVERY 4 HOURS PRN
Qty: 18 TABLET | Refills: 0 | Status: SHIPPED | OUTPATIENT
Start: 2022-04-27 | End: 2022-04-30

## 2022-04-27 RX ORDER — ERYTHROMYCIN 5 MG/G
OINTMENT OPHTHALMIC
Qty: 3.5 G | Refills: 0 | Status: ON HOLD | OUTPATIENT
Start: 2022-04-27 | End: 2023-02-13 | Stop reason: HOSPADM

## 2022-04-27 RX ORDER — ERYTHROMYCIN 5 MG/G
OINTMENT OPHTHALMIC
Status: COMPLETED | OUTPATIENT
Start: 2022-04-27 | End: 2022-04-27

## 2022-04-27 RX ORDER — HYDROCODONE BITARTRATE AND ACETAMINOPHEN 5; 325 MG/1; MG/1
2 TABLET ORAL
Status: COMPLETED | OUTPATIENT
Start: 2022-04-27 | End: 2022-04-27

## 2022-04-27 RX ADMIN — FLUORESCEIN SODIUM AND BENOXINATE HYDROCHLORIDE 2 DROP: 2.6; 4.4 SOLUTION/ DROPS OPHTHALMIC at 03:04

## 2022-04-27 RX ADMIN — HYDROCODONE BITARTRATE AND ACETAMINOPHEN 2 TABLET: 5; 325 TABLET ORAL at 03:04

## 2022-04-27 RX ADMIN — ERYTHROMYCIN 1 INCH: 5 OINTMENT OPHTHALMIC at 03:04

## 2022-04-27 NOTE — ED NOTES
Educated pt/ spouse on eye care, cleaning eye, and administering ointment.   Educated on pain medications and OTC alternatives.  Educated on when to follow up and when to return to the ER monitoring for worsening of s/s.  Pt/ Spouse verbalized understanding.  Ambulatory and stable at time of discharge.

## 2022-04-28 NOTE — ED PROVIDER NOTES
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 No eye drops used of medication for pain. Photophobia reported. Tearing noted.      The patient currently presents with concern regarding eye pain.  This is localized to both eyes.  This began early this morning.  Notably the patient is a  and notes that he was not wearing his protective eye shield.  Patient has not taken any medications prior to arrival.  He has experienced this previously the though not to this degree.  Patient does note substantial photophobia.        Review of patient's allergies indicates:   Allergen Reactions    Sulfa (sulfonamide antibiotics) Swelling    Neosporin (neomycin-polymyx) Rash     Topical irritation     Aspirin Swelling    Latex, natural rubber Hives and Itching    Shellfish containing products Swelling     Past Medical History:   Diagnosis Date    Allergy     Back pain     GERD (gastroesophageal reflux disease)      Past Surgical History:   Procedure Laterality Date    ANKLE SURGERY Left     BACK SURGERY  01/30/2017    KNEE SURGERY      x 5    VASECTOMY       Family History   Problem Relation Age of Onset    Prostate cancer Father     Kidney cancer Neg Hx      Social History     Tobacco Use    Smoking status: Never Smoker    Smokeless tobacco: Never Used   Substance Use Topics    Alcohol use: Yes     Comment: occasional    Drug use: No     Review of Systems   Constitutional: Negative for chills and fever.   HENT: Negative for congestion and sore throat.    Eyes: Positive for photophobia, pain and redness. Negative for discharge.   Respiratory: Negative for chest tightness and shortness of breath.    Cardiovascular: Negative for chest pain and palpitations.   Gastrointestinal: Negative for abdominal pain and vomiting.    Genitourinary: Negative for difficulty urinating and dysuria.   Skin: Negative for color change and rash.   Allergic/Immunologic: Negative for immunocompromised state.   Neurological: Negative for weakness and numbness.   Hematological: Negative for adenopathy.   All other systems reviewed and are negative.    Physical Exam     Initial Vitals [04/27/22 0259]   BP Pulse Resp Temp SpO2   129/79 79 20 97.9 °F (36.6 °C) 96 %      MAP       --         Physical Exam    Constitutional: He appears well-developed and well-nourished. He is not diaphoretic. No distress.   HENT:   Head: Normocephalic and atraumatic.   Eyes: EOM and lids are normal. Pupils are equal, round, and reactive to light. Right conjunctiva is injected. Left conjunctiva is injected. No scleral icterus.   Slit lamp exam:       The right eye shows no corneal abrasion, no corneal flare and no corneal ulcer.        The left eye shows no corneal abrasion, no corneal flare and no corneal ulcer.   Scattered punctate flourescein uptake noted overlying the sclera bilaterally   Cardiovascular: Normal rate, regular rhythm, normal heart sounds and intact distal pulses.   Pulmonary/Chest: Breath sounds normal. No respiratory distress.     Neurological: He is alert and oriented to person, place, and time.   Skin: Skin is warm and dry.   Psychiatric: He has a normal mood and affect. His behavior is normal.         ED Course   Procedures  Labs Reviewed - No data to display       Imaging Results    None          Medications   HYDROcodone-acetaminophen 5-325 mg per tablet 2 tablet (2 tablets Oral Given 4/27/22 0310)   erythromycin 5 mg/gram (0.5 %) ophthalmic ointment (1 inch Both Eyes Given 4/27/22 0311)   fluorescein-benoxinate 0.3-0.4 % Drop 2 drop (2 drops Ophthalmic Given 4/27/22 0308)     Medical Decision Making:   ED Management:  All findings were reviewed with the patient/family in detail.  I see no indication of an emergent process beyond that addressed during  our encounter but have duly counseled the patient/family regarding the need for prompt follow-up as well as the indications that should prompt immediate return to the emergency room should new or worrisome developments occur.  The patient has additionally been provided with printed information regarding diagnosis as well as instructions regarding follow up and any medications intended to manage the patient's aforementioned conditions.  The patient/family communicates understanding of all this information and all remaining questions and concerns were addressed at this time.                            Clinical Impression:   Final diagnoses:  [H16.133] Welders' flash, bilateral (Primary)          ED Disposition Condition    Discharge Stable        ED Prescriptions     Medication Sig Dispense Start Date End Date Auth. Provider    HYDROcodone-acetaminophen (NORCO) 5-325 mg per tablet Take 1 tablet by mouth every 4 (four) hours as needed for Pain. 18 tablet 4/27/2022 4/30/2022 Kendrick Downs MD    erythromycin (ROMYCIN) ophthalmic ointment Place a 1/2 inch ribbon of ointment into the lower eyelid of each eye 4 times daily. 3.5 g 4/27/2022  Kendrick Downs MD        Follow-up Information     Follow up With Specialties Details Why Contact Info    PCP  Schedule an appointment as soon as possible for a visit  for reassessment     [facility] Emergency Dept Emergency Medicine Go to  As needed, If symptoms worsen [address] [facility] [id number] [phone]           Kendrick Downs MD  04/27/22 8275

## 2022-11-09 ENCOUNTER — TELEPHONE (OUTPATIENT)
Dept: ORTHOPEDICS | Facility: CLINIC | Age: 52
End: 2022-11-09
Payer: COMMERCIAL

## 2022-11-09 NOTE — TELEPHONE ENCOUNTER
----- Message from Tyesha Ingram sent at 11/9/2022 10:20 AM CST -----  Type:  Needs Medical Advice    Who Called: pt  Pt would like to consult with Dr Russ about scheduling a surgery     Would the patient rather a call back or a response via [facility]? call  Best Call Back Number: [phone]  Additional Information:

## 2022-11-14 ENCOUNTER — OFFICE VISIT (OUTPATIENT)
Dept: ORTHOPEDICS | Facility: CLINIC | Age: 52
End: 2022-11-14
Payer: COMMERCIAL

## 2022-11-14 VITALS — HEIGHT: 72 IN | BODY MASS INDEX: 35.89 KG/M2 | WEIGHT: 265 LBS

## 2022-11-14 DIAGNOSIS — M65.331 TRIGGER MIDDLE FINGER OF RIGHT HAND: Primary | ICD-10-CM

## 2022-11-14 DIAGNOSIS — M75.112 NONTRAUMATIC INCOMPLETE TEAR OF LEFT ROTATOR CUFF: ICD-10-CM

## 2022-11-14 PROCEDURE — 99214 PR OFFICE/OUTPT VISIT, EST, LEVL IV, 30-39 MIN: ICD-10-PCS | Mod: 25,S$GLB,, | Performed by: ORTHOPAEDIC SURGERY

## 2022-11-14 PROCEDURE — 3008F BODY MASS INDEX DOCD: CPT | Mod: CPTII,S$GLB,, | Performed by: ORTHOPAEDIC SURGERY

## 2022-11-14 PROCEDURE — 99999 PR PBB SHADOW E&M-EST. PATIENT-LVL III: ICD-10-PCS | Mod: PBBFAC,,, | Performed by: ORTHOPAEDIC SURGERY

## 2022-11-14 PROCEDURE — 1159F MED LIST DOCD IN RCRD: CPT | Mod: CPTII,S$GLB,, | Performed by: ORTHOPAEDIC SURGERY

## 2022-11-14 PROCEDURE — 20550 PR INJECT TENDON SHEATH/LIGAMENT: ICD-10-PCS | Mod: RT,S$GLB,, | Performed by: ORTHOPAEDIC SURGERY

## 2022-11-14 PROCEDURE — 99999 PR PBB SHADOW E&M-EST. PATIENT-LVL III: CPT | Mod: PBBFAC,,, | Performed by: ORTHOPAEDIC SURGERY

## 2022-11-14 PROCEDURE — 1159F PR MEDICATION LIST DOCUMENTED IN MEDICAL RECORD: ICD-10-PCS | Mod: CPTII,S$GLB,, | Performed by: ORTHOPAEDIC SURGERY

## 2022-11-14 PROCEDURE — 3008F PR BODY MASS INDEX (BMI) DOCUMENTED: ICD-10-PCS | Mod: CPTII,S$GLB,, | Performed by: ORTHOPAEDIC SURGERY

## 2022-11-14 PROCEDURE — 99214 OFFICE O/P EST MOD 30 MIN: CPT | Mod: 25,S$GLB,, | Performed by: ORTHOPAEDIC SURGERY

## 2022-11-14 PROCEDURE — 20550 NJX 1 TENDON SHEATH/LIGAMENT: CPT | Mod: RT,S$GLB,, | Performed by: ORTHOPAEDIC SURGERY

## 2022-11-14 RX ORDER — CEFAZOLIN SODIUM 2 G/50ML
2 SOLUTION INTRAVENOUS
Status: CANCELLED | OUTPATIENT
Start: 2022-11-14

## 2022-11-14 RX ORDER — TRIAMCINOLONE ACETONIDE 40 MG/ML
20 INJECTION, SUSPENSION INTRA-ARTICULAR; INTRAMUSCULAR
Status: COMPLETED | OUTPATIENT
Start: 2022-11-14 | End: 2022-11-14

## 2022-11-14 RX ADMIN — TRIAMCINOLONE ACETONIDE 20 MG: 40 INJECTION, SUSPENSION INTRA-ARTICULAR; INTRAMUSCULAR at 03:11

## 2022-11-14 NOTE — PROGRESS NOTES
CC:  Left shoulder impingement with partial tear rotator cuff and triggering of the right middle finger        HPI:  Esa Yang is a very pleasant 52 y.o. male with ongoing symptoms left shoulder related to partial tear   We have tried physical therapy as well as injection in the past without long-term relief   He would like to proceed with surgery for left shoulder arthroscopy   On unrelated matter he is having painful locking of the right middle finger no numbness or tingling is reported         PAST MEDICAL HISTORY:   Past Medical History:   Diagnosis Date    Allergy     Back pain     GERD (gastroesophageal reflux disease)      PAST SURGICAL HISTORY:   Past Surgical History:   Procedure Laterality Date    ANKLE SURGERY Left     BACK SURGERY  01/30/2017    KNEE SURGERY      x 5    VASECTOMY       FAMILY HISTORY:   Family History   Problem Relation Age of Onset    Prostate cancer Father     Kidney cancer Neg Hx      SOCIAL HISTORY:   Social History     Socioeconomic History    Marital status:    Tobacco Use    Smoking status: Never    Smokeless tobacco: Never   Substance and Sexual Activity    Alcohol use: Yes     Comment: occasional    Drug use: No    Sexual activity: Yes       MEDICATIONS:   Current Outpatient Medications:     erythromycin (ROMYCIN) ophthalmic ointment, Place a 1/2 inch ribbon of ointment into the lower eyelid of each eye 4 times daily., Disp: 3.5 g, Rfl: 0    pep injection, Inject .25 ml as directed   For compounding pharmacy use:  Add PAPAVERINE 30 mcg Add PHENTOLAMINE 10 mg Add ALPROSTADIL 100 mcg, Disp: 2 vial, Rfl: 11    tamsulosin (FLOMAX) 0.4 mg Cap, Take 1 capsule (0.4 mg total) by mouth once daily., Disp: 30 capsule, Rfl: 11    testosterone cypionate (DEPOTESTOTERONE CYPIONATE) 200 mg/mL injection, Inject 1 mL (200 mg total) into the muscle every 14 (fourteen) days., Disp: 10 mL, Rfl: 1    diazePAM (VALIUM) 5 MG tablet, Take 1 tablet (5 mg total) by mouth every 6 (six)  hours as needed for Anxiety., Disp: 4 tablet, Rfl: 0  No current facility-administered medications for this visit.    Facility-Administered Medications Ordered in Other Visits:     triamcinolone acetonide injection 40 mg, 40 mg, Intra-articular, , Sergey Kruse MD, 40 mg at 11/11/14 1327  ALLERGIES:   Review of patient's allergies indicates:   Allergen Reactions    Sulfa (sulfonamide antibiotics) Swelling    Neosporin (neomycin-polymyx) Rash     Topical irritation     Aspirin Swelling    Latex, natural rubber Hives and Itching    Shellfish containing products Swelling       Review of Systems:  Constitutional: no fever or chills  ENT: no nasal congestion or sore throat  Respiratory: no cough or shortness of breath  Cardiovascular: no chest pain or palpitations  Gastrointestinal: no nausea or vomiting, PUD, GERD, NSAID intolerance  Genitourinary: no hematuria or dysuria  Integument/Breast: no rash or pruritis  Hematologic/Lymphatic: no easy bruising or lymphadenopathy  Musculoskeletal: see HPI  Neurological: no seizures or tremors  Behavioral/Psych: no auditory or visual hallucinations      Physical Exam   Vitals:    11/14/22 1441   Weight: 120.2 kg (265 lb)   Height: 6' (1.829 m)   PainSc:   4   PainLoc: Shoulder       Constitutional: Oriented to person, place, and time. Appears well-developed and well-nourished.   HENT:   Head: Normocephalic and atraumatic.   Nose: Nose normal.   Eyes: No scleral icterus.   Neck: Normal range of motion.   Cardiovascular: Normal rate and regular rhythm.    Pulses:       Radial pulses are 2+ on the right side, and 2+ on the left side.   Pulmonary/Chest: Effort normal and breath sounds normal.   Abdominal: Soft.   Neurological: Alert and oriented to person, place, and time.   Skin: Skin is warm.   Psychiatric: Normal mood and affect.     MUSCULOSKELETAL UPPER EXTREMITY:  Examination left shoulder no tenderness no swelling  Range of motion slightly decreased due to pain  Positive  "impingement sign  Positive supraspinatus stress test  No instability  Examination of the right hand shows tenderness over the flexor tendon sheath to the right middle finger with triggering at the A1 pulley   Tinel sign negative               Diagnostic Studies:  MRI demonstrates partial tear rotator cuff with some degenerative changes of the joint as well        Assessment:  Partial tear rotator cuff left shoulder.      2. Arthritis left shoulder.      3. Triggering of the right middle finger    Plan:  For the right middle finger I recommended injection today  After pause for time-out identified the right middle finger injected flexor tendon sheath combination Kenalog 20 mg 0.5 cc xylocaine sterile technique  Tolerated the procedure well without complication   For the left shoulder he would like to set up surgery for left shoulder arthroscopy rotator cuff repair and possible AC joint resection risks and benefits of surgery explained to the patient as well as the fact that he may have some symptoms related to arthritis that would continue even after surgery he understood      The risks and benefits of surgery were discussed with the patient today and they understand.  The consent was signed in the office for surgery.      Montana Russ MD (Jay)  [facility]  Orthopedic Upper Extremity Surgery       "

## 2022-12-05 ENCOUNTER — TELEPHONE (OUTPATIENT)
Dept: PREADMISSION TESTING | Facility: HOSPITAL | Age: 52
End: 2022-12-05
Payer: COMMERCIAL

## 2022-12-05 DIAGNOSIS — Z01.818 PRE-OP TESTING: Primary | ICD-10-CM

## 2022-12-09 ENCOUNTER — CLINICAL SUPPORT (OUTPATIENT)
Dept: LAB | Facility: HOSPITAL | Age: 52
End: 2022-12-09
Attending: ORTHOPAEDIC SURGERY
Payer: COMMERCIAL

## 2022-12-09 ENCOUNTER — HOSPITAL ENCOUNTER (OUTPATIENT)
Dept: PREADMISSION TESTING | Facility: HOSPITAL | Age: 52
Discharge: HOME | End: 2022-12-09
Attending: ORTHOPAEDIC SURGERY
Payer: COMMERCIAL

## 2022-12-09 ENCOUNTER — ANESTHESIA EVENT (OUTPATIENT)
Dept: SURGERY | Facility: HOSPITAL | Age: 52
End: 2022-12-09
Payer: COMMERCIAL

## 2022-12-09 VITALS
WEIGHT: 272.94 LBS | BODY MASS INDEX: 36.97 KG/M2 | SYSTOLIC BLOOD PRESSURE: 144 MMHG | RESPIRATION RATE: 16 BRPM | OXYGEN SATURATION: 99 % | HEART RATE: 73 BPM | DIASTOLIC BLOOD PRESSURE: 87 MMHG | HEIGHT: 72 IN | TEMPERATURE: 99 F

## 2022-12-09 DIAGNOSIS — Z01.818 PRE-OP TESTING: ICD-10-CM

## 2022-12-09 PROCEDURE — 93010 EKG 12-LEAD: ICD-10-PCS | Mod: ,,, | Performed by: INTERNAL MEDICINE

## 2022-12-09 PROCEDURE — 93005 ELECTROCARDIOGRAM TRACING: CPT

## 2022-12-09 PROCEDURE — 93010 ELECTROCARDIOGRAM REPORT: CPT | Mod: ,,, | Performed by: INTERNAL MEDICINE

## 2022-12-09 RX ORDER — SODIUM CHLORIDE, SODIUM LACTATE, POTASSIUM CHLORIDE, CALCIUM CHLORIDE 600; 310; 30; 20 MG/100ML; MG/100ML; MG/100ML; MG/100ML
INJECTION, SOLUTION INTRAVENOUS CONTINUOUS
Status: CANCELLED | OUTPATIENT
Start: 2022-12-09

## 2022-12-09 RX ORDER — GABAPENTIN 100 MG/1
100 CAPSULE ORAL 3 TIMES DAILY
COMMUNITY

## 2022-12-09 RX ORDER — LIDOCAINE HYDROCHLORIDE 10 MG/ML
1 INJECTION, SOLUTION EPIDURAL; INFILTRATION; INTRACAUDAL; PERINEURAL ONCE
Status: CANCELLED | OUTPATIENT
Start: 2022-12-09 | End: 2022-12-09

## 2022-12-09 NOTE — PRE-PROCEDURE INSTRUCTIONS
Toyin Yang [phone]    Allergies, medical, surgical, family and psychosocial histories reviewed with patient. Periop plan of care reviewed. Preop instructions given, including medications to take and to hold. Hibiclens soap and instructions on use given. Time allotted for questions to be addressed.  Patient verbalized understanding.

## 2022-12-09 NOTE — DISCHARGE INSTRUCTIONS
Your surgery is scheduled for 12/16/22.    Please report to [facility] on the 1st Floor at 1130 a.m.    THIS TIME IS SUBJECT TO CHANGE.  YOU WILL RECEIVE A PHONE CALL THE DAY BEFORE SURGERY BY 3:30 PM TO CONFIRM YOUR TIME OF ARRIVAL.  IF YOU HAVE NOT RECEIVED A PHONE CALL BY 3:30 PM THE DAY BEFORE YOUR SURGERY PLEASE CALL [phone]     INSTRUCTIONS IMPORTANT!!!  ¨ Do not eat or drink after 12 midnight-including water, candy, gum, & mints. OK to brush teeth.      ____  Proceed to [facility] on *** for additional testing.        ____  Do not wear makeup, including mascara.  ____  No powder, lotions or creams to surgical area.  ____  Please remove all jewelry, including piercings and leave at home.  ____  No money or valuables needed. Please leave at home.  ____  Please bring any documents given by your doctor.  ____  If going home the same day, arrange for a ride home. You will not be able to             drive if Anesthesia was used.  ____  Children under 18 years require a parent / guardian present the entire time             they are in surgery / recovery.  ____  Wear loose fitting clothing. Allow for dressings, bandages.  ____  Stop Aspirin, Ibuprofen, Motrin, Aleve, Goody's/BC powders, Excedrine and Naproxen (NSAIDS) at least 3-5 days before surgery, unless otherwise instructed by your doctor, or the nurse.   You MAY use Tylenol/acetaminophen until day of surgery.  ____  Wash the surgical area with Hibiclens the night before surgery, and again the             morning of surgery.  Be sure to rinse hibiclens off completely (if instructed by   nurse).  ____  If you take diabetic medication, do not take am of surgery unless instructed by Doctor.  ____  Call MD for temperature above 101 degrees or any other signs of infection such as Urinary (bladder) infection, Upper respiratory infection, skin boils, etc.  ____ Stop taking any Fish Oil supplement or any Vitamins that contain Vitamin E at  least 5 days prior to surgery.  ____ Do Not wear your contact lenses the day of your procedure.  You may wear your glasses.      ____Do not shave surgical site for 3 days prior to surgery.  ____ Practice Good hand washing before, during, and after procedure.      I have read or had read and explained to me, and understand the above information.  Additional comments or instructions:  For additional questions call [phone]      ANESTHESIA SIDE EFFECTS  -For the first 24 hours after surgery:  Do not drive, use heavy equipment, make important decisions, or drink alcohol  -It is normal to feel sleepy for several hours.  Rest until you are more awake.  -Have someone stay with you, if needed.  They can watch for problems and help keep you safe.  -Some possible post anesthesia side effects include: nausea and vomiting, sore throat and hoarseness, sleepiness, and dizziness.        Pre-Op Bathing Instructions    Before surgery, you can play an important role in your own health.    Because skin is not sterile, we need to be sure that your skin is as free of germs as possible. By following the instructions below, you can reduce the number of germs on your skin before surgery.    IMPORTANT: You will need to shower with a special soap called Hibiclens*, available at any pharmacy.  If you are allergic to Chlorhexidine (the antiseptic in Hibiclens), use an antibacterial soap such as Dial Soap for your preoperative shower.  You will shower with Hibiclens both the night before your surgery and the morning of your surgery.  Do not use Hibiclens on the head, face or genitals to avoid injury to those areas.    STEP #1: THE NIGHT BEFORE YOUR SURGERY     Do not shave the area of your body where your surgery will be performed.  Shower and wash your hair and body as usual with your normal soap and shampoo.  Rinse your hair and body thoroughly after you shower to remove all soap residue.  With your hand, apply one packet of Hibiclens soap to  the surgical site.   Wash the site gently for five (5) minutes. Do not scrub your skin too hard.   Do not wash with your regular soap after Hibiclens is used.  Rinse your body thoroughly.  Pat yourself dry with a clean, soft towel.  Do not use lotion, cream, or powder.  Wear clean clothes.    STEP #2: THE MORNING OF YOUR SURGERY     Repeat Step #1.    * Not to be used by people allergic to Chlorhexidine.

## 2022-12-09 NOTE — ANESTHESIA PREPROCEDURE EVALUATION
12/09/2022  Esa Yang is a 52 y.o., male scheduled for REPAIR, ROTATOR CUFF, ARTHROSCOPIC (Left: Shoulder) 12/16/22.    Past Medical History:   Diagnosis Date    Allergy     Back pain     GERD (gastroesophageal reflux disease)      Past Surgical History:   Procedure Laterality Date    ANKLE SURGERY Left     BACK SURGERY  01/30/2017    KNEE SURGERY      x 5    VASECTOMY       Current Outpatient Medications   Medication Instructions    diazePAM (VALIUM) 5 mg, Oral, Every 6 hours PRN    erythromycin (ROMYCIN) ophthalmic ointment Place a 1/2 inch ribbon of ointment into the lower eyelid of each eye 4 times daily.    gabapentin (NEURONTIN) 100 mg, Oral, 3 times daily    pep injection Inject .25 ml as directed <BR><BR>For compounding pharmacy use: <BR>Add PAPAVERINE 30 mcg<BR>Add PHENTOLAMINE 10 mg<BR>Add ALPROSTADIL 100 mcg    tamsulosin (FLOMAX) 0.4 mg, Oral, Daily    testosterone cypionate (DEPOTESTOTERONE CYPIONATE) 200 mg, Intramuscular, Every 14 days       Pre-op Assessment    I have reviewed the Patient Summary Reports.     I have reviewed the Nursing Notes.    I have reviewed the Medications.     Review of Systems  Anesthesia Hx:  Personal Hx of Anesthesia complications  Post Dural Puncture Headache (when in college) and after spinal anesthesia  Social:  Social Alcohol Use, Non-Smoker    Cardiovascular:  Cardiovascular Normal Exercise tolerance: good   Denies Angina.    Pulmonary:  Pulmonary Normal  Denies Shortness of breath.    Renal/:  Renal/ Normal   Other Renal / Gu Conditions: Benign Prostatic Hypertrophy (BPH)  Hepatic/GI:   GERD, well controlled    Musculoskeletal:   Arthritis     Neurological:  Neurology Normal Denies TIA.  Denies CVA. Denies Seizures.    Endocrine:  Endocrine Normal  Obesity / BMI > 30  Psych:  Psychiatric Normal           Physical Exam  General: Well  nourished and Cooperative    Airway:  Mallampati: IV / III  Mouth Opening: Normal  TM Distance: Normal  Tongue: Large  Neck ROM: Normal ROM    Dental:  Intact    Chest/Lungs:  Clear to auscultation, Normal Respiratory Rate    Heart:  Rate: Normal  Rhythm: Regular Rhythm  Sounds: Normal      Lab Results   Component Value Date    WBC 10.46 09/09/2018    HGB 14.3 09/09/2018    HCT 42.6 09/09/2018     09/09/2018    CHOL 213 (H) 11/14/2014    TRIG 121 11/14/2014    HDL 42 11/14/2014    ALT 15 09/09/2018    AST 24 09/09/2018     09/09/2018    K 4.2 09/09/2018     09/09/2018    CREATININE 1.47 (H) 09/09/2018    BUN 19 09/09/2018    CO2 28 09/09/2018    TSH 2.775 11/10/2014    PSA 0.48 07/06/2021    INR 1.2 09/09/2018    HGBA1C 6.2 11/14/2014         Anesthesia Plan  Type of Anesthesia, risks & benefits discussed:    Anesthesia Type: Regional, Gen ETT  Intra-op Monitoring Plan: Standard ASA Monitors  Post Op Pain Control Plan: multimodal analgesia  Induction:  IV  ASA Score: 2  Anesthesia Plan Notes: Anesthesia consent to be signed prior to surgery 12/16/22      Ready For Surgery From Anesthesia Perspective.     .

## 2022-12-16 ENCOUNTER — ANESTHESIA (OUTPATIENT)
Dept: SURGERY | Facility: HOSPITAL | Age: 52
End: 2022-12-16
Payer: COMMERCIAL

## 2022-12-16 ENCOUNTER — HOSPITAL ENCOUNTER (OUTPATIENT)
Facility: HOSPITAL | Age: 52
Discharge: HOME | End: 2022-12-16
Attending: ORTHOPAEDIC SURGERY | Admitting: ORTHOPAEDIC SURGERY
Payer: COMMERCIAL

## 2022-12-16 DIAGNOSIS — M65.331 TRIGGER MIDDLE FINGER OF RIGHT HAND: ICD-10-CM

## 2022-12-16 DIAGNOSIS — M75.112 NONTRAUMATIC INCOMPLETE TEAR OF LEFT ROTATOR CUFF: ICD-10-CM

## 2022-12-16 PROCEDURE — 63600175 PHARM REV CODE 636 W HCPCS: Performed by: NURSE PRACTITIONER

## 2022-12-16 PROCEDURE — 36000711: Performed by: ORTHOPAEDIC SURGERY

## 2022-12-16 PROCEDURE — 63600175 PHARM REV CODE 636 W HCPCS: Performed by: ORTHOPAEDIC SURGERY

## 2022-12-16 PROCEDURE — C9290 INJ, BUPIVACAINE LIPOSOME: HCPCS | Performed by: ANESTHESIOLOGY

## 2022-12-16 PROCEDURE — D9220A PRA ANESTHESIA: Mod: ANES,,, | Performed by: ANESTHESIOLOGY

## 2022-12-16 PROCEDURE — 63600175 PHARM REV CODE 636 W HCPCS: Performed by: NURSE ANESTHETIST, CERTIFIED REGISTERED

## 2022-12-16 PROCEDURE — 25000003 PHARM REV CODE 250: Performed by: NURSE ANESTHETIST, CERTIFIED REGISTERED

## 2022-12-16 PROCEDURE — C1713 ANCHOR/SCREW BN/BN,TIS/BN: HCPCS | Performed by: ORTHOPAEDIC SURGERY

## 2022-12-16 PROCEDURE — 29827 SHO ARTHRS SRG RT8TR CUF RPR: CPT | Mod: LT,,, | Performed by: ORTHOPAEDIC SURGERY

## 2022-12-16 PROCEDURE — D9220A PRA ANESTHESIA: ICD-10-PCS | Mod: CRNA,,, | Performed by: NURSE ANESTHETIST, CERTIFIED REGISTERED

## 2022-12-16 PROCEDURE — 71000015 HC POSTOP RECOV 1ST HR: Performed by: ORTHOPAEDIC SURGERY

## 2022-12-16 PROCEDURE — 63600175 PHARM REV CODE 636 W HCPCS: Performed by: ANESTHESIOLOGY

## 2022-12-16 PROCEDURE — C1889 IMPLANT/INSERT DEVICE, NOC: HCPCS | Performed by: ORTHOPAEDIC SURGERY

## 2022-12-16 PROCEDURE — 37000009 HC ANESTHESIA EA ADD 15 MINS: Performed by: ORTHOPAEDIC SURGERY

## 2022-12-16 PROCEDURE — 25000003 PHARM REV CODE 250: Performed by: ANESTHESIOLOGY

## 2022-12-16 PROCEDURE — 71000016 HC POSTOP RECOV ADDL HR: Performed by: ORTHOPAEDIC SURGERY

## 2022-12-16 PROCEDURE — 29824 PR SHLDR ARTHROSCOP,SURG,DIS CLAVICULECTOMY: ICD-10-PCS | Mod: 51,LT,, | Performed by: ORTHOPAEDIC SURGERY

## 2022-12-16 PROCEDURE — 76942 ECHO GUIDE FOR BIOPSY: CPT | Performed by: ANESTHESIOLOGY

## 2022-12-16 PROCEDURE — 37000008 HC ANESTHESIA 1ST 15 MINUTES: Performed by: ORTHOPAEDIC SURGERY

## 2022-12-16 PROCEDURE — 20550 PR INJECT TENDON SHEATH/LIGAMENT: ICD-10-PCS | Mod: 59,51,F7, | Performed by: ORTHOPAEDIC SURGERY

## 2022-12-16 PROCEDURE — 29826 SHO ARTHRS SRG DECOMPRESSION: CPT | Mod: LT,,, | Performed by: ORTHOPAEDIC SURGERY

## 2022-12-16 PROCEDURE — 29826 PR SHLDR ARTHROSCOP,PART ACROMIOPLAS: ICD-10-PCS | Mod: LT,,, | Performed by: ORTHOPAEDIC SURGERY

## 2022-12-16 PROCEDURE — D9220A PRA ANESTHESIA: Mod: CRNA,,, | Performed by: NURSE ANESTHETIST, CERTIFIED REGISTERED

## 2022-12-16 PROCEDURE — 20550 NJX 1 TENDON SHEATH/LIGAMENT: CPT | Mod: 59,51,F7, | Performed by: ORTHOPAEDIC SURGERY

## 2022-12-16 PROCEDURE — 27201423 OPTIME MED/SURG SUP & DEVICES STERILE SUPPLY: Performed by: ORTHOPAEDIC SURGERY

## 2022-12-16 PROCEDURE — 36000710: Performed by: ORTHOPAEDIC SURGERY

## 2022-12-16 PROCEDURE — 25000003 PHARM REV CODE 250: Performed by: ORTHOPAEDIC SURGERY

## 2022-12-16 PROCEDURE — 29824 SHO ARTHRS SRG DSTL CLAVICLC: CPT | Mod: 51,LT,, | Performed by: ORTHOPAEDIC SURGERY

## 2022-12-16 PROCEDURE — D9220A PRA ANESTHESIA: ICD-10-PCS | Mod: ANES,,, | Performed by: ANESTHESIOLOGY

## 2022-12-16 PROCEDURE — 29827 PR SHLDR ARTHROSCOP,SURG,W/ROTAT CUFF REPR: ICD-10-PCS | Mod: LT,,, | Performed by: ORTHOPAEDIC SURGERY

## 2022-12-16 PROCEDURE — 64416 NJX AA&/STRD BRCH PL NFS IMG: CPT | Mod: 59,LT | Performed by: ANESTHESIOLOGY

## 2022-12-16 PROCEDURE — 71000033 HC RECOVERY, INTIAL HOUR: Performed by: ORTHOPAEDIC SURGERY

## 2022-12-16 DEVICE — ANCHOR SUT KNOTLESS MAG 2: Type: IMPLANTABLE DEVICE | Site: SHOULDER | Status: FUNCTIONAL

## 2022-12-16 RX ORDER — LIDOCAINE HYDROCHLORIDE 10 MG/ML
INJECTION, SOLUTION EPIDURAL; INFILTRATION; INTRACAUDAL; PERINEURAL
Status: DISCONTINUED | OUTPATIENT
Start: 2022-12-16 | End: 2022-12-16 | Stop reason: HOSPADM

## 2022-12-16 RX ORDER — EPHEDRINE SULFATE 50 MG/ML
INJECTION, SOLUTION INTRAVENOUS
Status: DISCONTINUED | OUTPATIENT
Start: 2022-12-16 | End: 2022-12-16

## 2022-12-16 RX ORDER — OXYCODONE HYDROCHLORIDE 5 MG/1
10 TABLET ORAL EVERY 4 HOURS PRN
Status: DISCONTINUED | OUTPATIENT
Start: 2022-12-16 | End: 2022-12-16 | Stop reason: HOSPADM

## 2022-12-16 RX ORDER — BUPIVACAINE HYDROCHLORIDE 5 MG/ML
INJECTION, SOLUTION EPIDURAL; INTRACAUDAL
Status: COMPLETED | OUTPATIENT
Start: 2022-12-16 | End: 2022-12-16

## 2022-12-16 RX ORDER — LIDOCAINE HYDROCHLORIDE 10 MG/ML
1 INJECTION, SOLUTION EPIDURAL; INFILTRATION; INTRACAUDAL; PERINEURAL ONCE
Status: DISCONTINUED | OUTPATIENT
Start: 2022-12-16 | End: 2022-12-16 | Stop reason: HOSPADM

## 2022-12-16 RX ORDER — ACETAMINOPHEN 325 MG/1
650 TABLET ORAL EVERY 4 HOURS PRN
Status: DISCONTINUED | OUTPATIENT
Start: 2022-12-16 | End: 2022-12-16 | Stop reason: HOSPADM

## 2022-12-16 RX ORDER — EPINEPHRINE 1 MG/ML
INJECTION, SOLUTION, CONCENTRATE INTRAVENOUS
Status: DISCONTINUED | OUTPATIENT
Start: 2022-12-16 | End: 2022-12-16 | Stop reason: HOSPADM

## 2022-12-16 RX ORDER — OXYCODONE AND ACETAMINOPHEN 7.5; 325 MG/1; MG/1
1 TABLET ORAL EVERY 4 HOURS PRN
Qty: 40 TABLET | Refills: 0 | Status: ON HOLD | OUTPATIENT
Start: 2022-12-16 | End: 2023-02-13 | Stop reason: HOSPADM

## 2022-12-16 RX ORDER — PROPOFOL 10 MG/ML
VIAL (ML) INTRAVENOUS
Status: DISCONTINUED | OUTPATIENT
Start: 2022-12-16 | End: 2022-12-16

## 2022-12-16 RX ORDER — SODIUM CHLORIDE, SODIUM LACTATE, POTASSIUM CHLORIDE, CALCIUM CHLORIDE 600; 310; 30; 20 MG/100ML; MG/100ML; MG/100ML; MG/100ML
INJECTION, SOLUTION INTRAVENOUS CONTINUOUS
Status: DISCONTINUED | OUTPATIENT
Start: 2022-12-16 | End: 2022-12-16 | Stop reason: HOSPADM

## 2022-12-16 RX ORDER — FENTANYL CITRATE 50 UG/ML
INJECTION, SOLUTION INTRAMUSCULAR; INTRAVENOUS
Status: DISCONTINUED | OUTPATIENT
Start: 2022-12-16 | End: 2022-12-16

## 2022-12-16 RX ORDER — ACETAMINOPHEN 10 MG/ML
INJECTION, SOLUTION INTRAVENOUS
Status: DISCONTINUED | OUTPATIENT
Start: 2022-12-16 | End: 2022-12-16

## 2022-12-16 RX ORDER — ROCURONIUM BROMIDE 10 MG/ML
INJECTION, SOLUTION INTRAVENOUS
Status: DISCONTINUED | OUTPATIENT
Start: 2022-12-16 | End: 2022-12-16

## 2022-12-16 RX ORDER — DIPHENHYDRAMINE HYDROCHLORIDE 50 MG/ML
INJECTION INTRAMUSCULAR; INTRAVENOUS
Status: DISCONTINUED | OUTPATIENT
Start: 2022-12-16 | End: 2022-12-16

## 2022-12-16 RX ORDER — HYDROCODONE BITARTRATE AND ACETAMINOPHEN 5; 325 MG/1; MG/1
1 TABLET ORAL EVERY 4 HOURS PRN
Status: DISCONTINUED | OUTPATIENT
Start: 2022-12-16 | End: 2022-12-16 | Stop reason: HOSPADM

## 2022-12-16 RX ORDER — CEFAZOLIN SODIUM 2 G/50ML
2 SOLUTION INTRAVENOUS
Status: DISCONTINUED | OUTPATIENT
Start: 2022-12-16 | End: 2022-12-16 | Stop reason: HOSPADM

## 2022-12-16 RX ORDER — PHENYLEPHRINE HYDROCHLORIDE 10 MG/ML
INJECTION INTRAVENOUS
Status: DISCONTINUED | OUTPATIENT
Start: 2022-12-16 | End: 2022-12-16

## 2022-12-16 RX ORDER — HYDROMORPHONE HYDROCHLORIDE 2 MG/ML
0.2 INJECTION, SOLUTION INTRAMUSCULAR; INTRAVENOUS; SUBCUTANEOUS EVERY 5 MIN PRN
Status: DISCONTINUED | OUTPATIENT
Start: 2022-12-16 | End: 2022-12-16 | Stop reason: HOSPADM

## 2022-12-16 RX ORDER — MIDAZOLAM HYDROCHLORIDE 1 MG/ML
INJECTION INTRAMUSCULAR; INTRAVENOUS
Status: DISCONTINUED | OUTPATIENT
Start: 2022-12-16 | End: 2022-12-16

## 2022-12-16 RX ORDER — DEXAMETHASONE SODIUM PHOSPHATE 4 MG/ML
INJECTION, SOLUTION INTRA-ARTICULAR; INTRALESIONAL; INTRAMUSCULAR; INTRAVENOUS; SOFT TISSUE
Status: DISCONTINUED | OUTPATIENT
Start: 2022-12-16 | End: 2022-12-16

## 2022-12-16 RX ORDER — SODIUM CHLORIDE 0.9 % (FLUSH) 0.9 %
10 SYRINGE (ML) INJECTION
Status: DISCONTINUED | OUTPATIENT
Start: 2022-12-16 | End: 2022-12-16 | Stop reason: HOSPADM

## 2022-12-16 RX ORDER — NEOSTIGMINE METHYLSULFATE 1 MG/ML
INJECTION, SOLUTION INTRAVENOUS
Status: DISCONTINUED | OUTPATIENT
Start: 2022-12-16 | End: 2022-12-16

## 2022-12-16 RX ORDER — TRIAMCINOLONE ACETONIDE 40 MG/ML
INJECTION, SUSPENSION INTRA-ARTICULAR; INTRAMUSCULAR
Status: DISCONTINUED | OUTPATIENT
Start: 2022-12-16 | End: 2022-12-16 | Stop reason: HOSPADM

## 2022-12-16 RX ORDER — ONDANSETRON HYDROCHLORIDE 2 MG/ML
INJECTION, SOLUTION INTRAMUSCULAR; INTRAVENOUS
Status: DISCONTINUED | OUTPATIENT
Start: 2022-12-16 | End: 2022-12-16

## 2022-12-16 RX ORDER — LIDOCAINE HCL/PF 100 MG/5ML
SYRINGE (ML) INTRAVENOUS
Status: DISCONTINUED | OUTPATIENT
Start: 2022-12-16 | End: 2022-12-16

## 2022-12-16 RX ORDER — ONDANSETRON 8 MG/1
8 TABLET, ORALLY DISINTEGRATING ORAL EVERY 8 HOURS PRN
Status: DISCONTINUED | OUTPATIENT
Start: 2022-12-16 | End: 2022-12-16 | Stop reason: HOSPADM

## 2022-12-16 RX ADMIN — EPHEDRINE SULFATE 10 MG: 50 INJECTION, SOLUTION INTRAVENOUS at 02:12

## 2022-12-16 RX ADMIN — DIPHENHYDRAMINE HYDROCHLORIDE 12.5 MG: 50 INJECTION INTRAMUSCULAR; INTRAVENOUS at 02:12

## 2022-12-16 RX ADMIN — PHENYLEPHRINE HYDROCHLORIDE 200 MCG: 10 INJECTION INTRAVENOUS at 01:12

## 2022-12-16 RX ADMIN — FENTANYL CITRATE 100 MCG: 0.05 INJECTION, SOLUTION INTRAMUSCULAR; INTRAVENOUS at 01:12

## 2022-12-16 RX ADMIN — CEFAZOLIN SODIUM 3 G: 2 SOLUTION INTRAVENOUS at 01:12

## 2022-12-16 RX ADMIN — ACETAMINOPHEN 1000 MG: 10 INJECTION, SOLUTION INTRAVENOUS at 02:12

## 2022-12-16 RX ADMIN — GLYCOPYRROLATE 0.6 MG: 0.2 INJECTION, SOLUTION INTRAMUSCULAR; INTRAVITREAL at 03:12

## 2022-12-16 RX ADMIN — BUPIVACAINE 10 ML: 13.3 INJECTION, SUSPENSION, LIPOSOMAL INFILTRATION at 12:12

## 2022-12-16 RX ADMIN — ROCURONIUM BROMIDE 50 MG: 10 INJECTION, SOLUTION INTRAVENOUS at 01:12

## 2022-12-16 RX ADMIN — PHENYLEPHRINE HYDROCHLORIDE 200 MCG: 10 INJECTION INTRAVENOUS at 02:12

## 2022-12-16 RX ADMIN — DEXAMETHASONE SODIUM PHOSPHATE 8 MG: 4 INJECTION, SOLUTION INTRA-ARTICULAR; INTRALESIONAL; INTRAMUSCULAR; INTRAVENOUS; SOFT TISSUE at 02:12

## 2022-12-16 RX ADMIN — NEOSTIGMINE METHYLSULFATE 5 MG: 1 INJECTION INTRAVENOUS at 03:12

## 2022-12-16 RX ADMIN — SODIUM CHLORIDE, SODIUM LACTATE, POTASSIUM CHLORIDE, AND CALCIUM CHLORIDE: .6; .31; .03; .02 INJECTION, SOLUTION INTRAVENOUS at 11:12

## 2022-12-16 RX ADMIN — ONDANSETRON 8 MG: 2 INJECTION INTRAMUSCULAR; INTRAVENOUS at 03:12

## 2022-12-16 RX ADMIN — PHENYLEPHRINE HYDROCHLORIDE 0.2 MCG/KG/MIN: 10 INJECTION INTRAVENOUS at 02:12

## 2022-12-16 RX ADMIN — BUPIVACAINE HYDROCHLORIDE 10 ML: 5 INJECTION, SOLUTION EPIDURAL; INTRACAUDAL; PERINEURAL at 12:12

## 2022-12-16 RX ADMIN — PHENYLEPHRINE HYDROCHLORIDE 100 MCG: 10 INJECTION INTRAVENOUS at 01:12

## 2022-12-16 RX ADMIN — HYDROMORPHONE HYDROCHLORIDE 0.2 MG: 2 INJECTION, SOLUTION INTRAMUSCULAR; INTRAVENOUS; SUBCUTANEOUS at 03:12

## 2022-12-16 RX ADMIN — MIDAZOLAM HYDROCHLORIDE 2 MG: 1 INJECTION, SOLUTION INTRAMUSCULAR; INTRAVENOUS at 12:12

## 2022-12-16 RX ADMIN — EPHEDRINE SULFATE 25 MG: 50 INJECTION, SOLUTION INTRAMUSCULAR; INTRAVENOUS; SUBCUTANEOUS at 02:12

## 2022-12-16 RX ADMIN — PROPOFOL 200 MG: 10 INJECTION, EMULSION INTRAVENOUS at 01:12

## 2022-12-16 RX ADMIN — LIDOCAINE HYDROCHLORIDE 100 MG: 20 INJECTION, SOLUTION INTRAVENOUS at 01:12

## 2022-12-16 NOTE — ANESTHESIA PROCEDURE NOTES
Intubation    Date/Time: 12/16/2022 1:40 PM  Performed by: Lilian Agudelo CRNA  Authorized by: Too Corbin Jr., MD     Intubation:     Induction:  Intravenous    Intubated:  Postinduction    Mask Ventilation:  Easy with oral airway    Attempts:  1    Attempted By:  CRNA    Method of Intubation:  Direct    Blade:  Cristal 4    Laryngeal View Grade: Grade IIA - cords partially seen      Difficult Airway Encountered?: No      Complications:  None    Airway Device:  Oral endotracheal tube    Airway Device Size:  7.5    Style/Cuff Inflation:  Cuffed (inflated to minimal occlusive pressure)    Tube secured:  23    Secured at:  The lips    Placement Verified By:  Capnometry    Complicating Factors:  None    Findings Post-Intubation:  BS equal bilateral and atraumatic/condition of teeth unchanged  Notes:      Soft tissue and dentition unchanged.

## 2022-12-16 NOTE — H&P
CC:  Left shoulder impingement with partial tear rotator cuff and triggering of the right middle finger           HPI:  Esa Yang is a very pleasant 52 y.o. male with ongoing symptoms left shoulder related to partial tear   We have tried physical therapy as well as injection in the past without long-term relief   He would like to proceed with surgery for left shoulder arthroscopy   On unrelated matter he is having painful locking of the right middle finger no numbness or tingling is reported            PAST MEDICAL HISTORY:        Past Medical History:   Diagnosis Date    Allergy      Back pain      GERD (gastroesophageal reflux disease)        PAST SURGICAL HISTORY:         Past Surgical History:   Procedure Laterality Date    ANKLE SURGERY Left      BACK SURGERY   01/30/2017    KNEE SURGERY         x 5    VASECTOMY          FAMILY HISTORY:         Family History   Problem Relation Age of Onset    Prostate cancer Father      Kidney cancer Neg Hx        SOCIAL HISTORY:   Social History               Socioeconomic History    Marital status:    Tobacco Use    Smoking status: Never    Smokeless tobacco: Never   Substance and Sexual Activity    Alcohol use: Yes       Comment: occasional    Drug use: No    Sexual activity: Yes            MEDICATIONS:   Current Outpatient Medications:     erythromycin (ROMYCIN) ophthalmic ointment, Place a 1/2 inch ribbon of ointment into the lower eyelid of each eye 4 times daily., Disp: 3.5 g, Rfl: 0    pep injection, Inject .25 ml as directed   For compounding pharmacy use:  Add PAPAVERINE 30 mcg Add PHENTOLAMINE 10 mg Add ALPROSTADIL 100 mcg, Disp: 2 vial, Rfl: 11    tamsulosin (FLOMAX) 0.4 mg Cap, Take 1 capsule (0.4 mg total) by mouth once daily., Disp: 30 capsule, Rfl: 11    testosterone cypionate (DEPOTESTOTERONE CYPIONATE) 200 mg/mL injection, Inject 1 mL (200 mg total) into the muscle every 14 (fourteen) days., Disp: 10 mL, Rfl: 1    diazePAM (VALIUM) 5 MG tablet,  Take 1 tablet (5 mg total) by mouth every 6 (six) hours as needed for Anxiety., Disp: 4 tablet, Rfl: 0  No current facility-administered medications for this visit.     Facility-Administered Medications Ordered in Other Visits:     triamcinolone acetonide injection 40 mg, 40 mg, Intra-articular, , Sergey Kruse MD, 40 mg at 11/11/14 1327  ALLERGIES:         Review of patient's allergies indicates:   Allergen Reactions    Sulfa (sulfonamide antibiotics) Swelling    Neosporin (neomycin-polymyx) Rash       Topical irritation     Aspirin Swelling    Latex, natural rubber Hives and Itching    Shellfish containing products Swelling         Review of Systems:  Constitutional: no fever or chills  ENT: no nasal congestion or sore throat  Respiratory: no cough or shortness of breath  Cardiovascular: no chest pain or palpitations  Gastrointestinal: no nausea or vomiting, PUD, GERD, NSAID intolerance  Genitourinary: no hematuria or dysuria  Integument/Breast: no rash or pruritis  Hematologic/Lymphatic: no easy bruising or lymphadenopathy  Musculoskeletal: see HPI  Neurological: no seizures or tremors  Behavioral/Psych: no auditory or visual hallucinations        Physical Exam       Vitals:     11/14/22 1441   Weight: 120.2 kg (265 lb)   Height: 6' (1.829 m)   PainSc:   4   PainLoc: Shoulder         Constitutional: Oriented to person, place, and time. Appears well-developed and well-nourished.   HENT:   Head: Normocephalic and atraumatic.   Nose: Nose normal.   Eyes: No scleral icterus.   Neck: Normal range of motion.   Cardiovascular: Normal rate and regular rhythm.    Pulses:       Radial pulses are 2+ on the right side, and 2+ on the left side.   Pulmonary/Chest: Effort normal and breath sounds normal.   Abdominal: Soft.   Neurological: Alert and oriented to person, place, and time.   Skin: Skin is warm.   Psychiatric: Normal mood and affect.      MUSCULOSKELETAL UPPER EXTREMITY:  Examination left shoulder no  "tenderness no swelling  Range of motion slightly decreased due to pain  Positive impingement sign  Positive supraspinatus stress test  No instability  Examination of the right hand shows tenderness over the flexor tendon sheath to the right middle finger with triggering at the A1 pulley   Tinel sign negative                     Diagnostic Studies:  MRI demonstrates partial tear rotator cuff with some degenerative changes of the joint as well           Assessment:  Partial tear rotator cuff left shoulder.      2. Arthritis left shoulder.      3. Triggering of the right middle finger     Plan:  For the right middle finger I recommended injection today  After pause for time-out identified the right middle finger injected flexor tendon sheath combination Kenalog 20 mg 0.5 cc xylocaine sterile technique  Tolerated the procedure well without complication   For the left shoulder he would like to set up surgery for left shoulder arthroscopy rotator cuff repair and possible AC joint resection risks and benefits of surgery explained to the patient as well as the fact that he may have some symptoms related to arthritis that would continue even after surgery he understood        The risks and benefits of surgery were discussed with the patient today and they understand.  The consent was signed in the office for surgery.        Montana Russ MD (Jay)  [facility]  Orthopedic Upper Extremity Surgery     "

## 2022-12-16 NOTE — DISCHARGE INSTRUCTIONS
You may remove your dressing in 24 hours and shower.  No tub baths, swimming pools or hot tubs until cleared to do so by your doctor.  Use an ice pack to your left shoulder using barrier between ice and skin as instructed.  Do not bear weight to your left arm until cleared to do so by Dr. Russ.  Call Dr. Russ for any questions or concerns regarding your surgery.    ANESTHESIA  -For the first 24 hours after surgery:  Do not drive, use heavy equipment, make important decisions, or drink alcohol  -It is normal to feel sleepy for several hours.  Rest until you are more awake.  -Have someone stay with you, if needed.  They can watch for problems and help keep you safe.  -Some possible post anesthesia side effects include: nausea and vomiting, sore throat and hoarseness, sleepiness, and dizziness.    PAIN  -If you have pain after surgery, pain medicine will help you feel better.  Take it as directed, before pain becomes severe.  Most pain relievers taken by mouth need at least 20-30 minutes to start working.  -Do not drive or drink alcohol while taking pain medicine.  -Pain medication can upset your stomach.  Taking them with a little food may help.  -Other ways to help control pain: elevation, ice, and relaxation  -Call your surgeon if still having unmanageable pain an hour after taking pain medicine.  -Pain medicine can cause constipation.  Taking an over-the counter stool softener while on prescription pain medicine and drinking plenty of fluids can prevent this side effect.  -Call your surgeon if you have severe side effects like: breathing problems, trouble waking up, dizziness, confusion, or severe constipation.    NAUSEA  -Some people have nausea after surgery.  This is often because of anesthesia, pain, pain medicine, or the stress of surgery.  -Do not push yourself to eat.  Start off with clear liquids and soup.  Slowly move to solid foods.  Don't eat fatty, rich, spicy foods at first.  Eat smaller  amounts.  -If you develop persistent nausea and vomiting please notify your surgeon immediately.    BLEEDING  -Different types of surgery require different types of care and dressing changes.  It is important to follow all instructions and advice from your surgeon.  Change dressing as directed.  Call your surgeon for any concerns regarding postop bleeding.    SIGNS OF INFECTION  -Signs of infection include: fever, swelling, drainage, and redness  -Notify your surgeon if you have a fever of 100.4 F (38.0 C) or higher.  -Notify your surgeon if you notice redness, swelling, increased pain, pus, or a foul smell at the incision site.

## 2022-12-16 NOTE — OP NOTE
[facility] ([facility])  Operative Note      Date of Procedure: 12/16/2022     Procedure: Procedure(s) (LRB):  REPAIR, ROTATOR CUFF, ARTHROSCOPIC (Left)  ARTHROSCOPY, SHOULDER, WITH SUBACROMIAL SPACE DECOMPRESSION (Left)  INJECTION,TENDON SHEATH OR LIGAMENT,1 TENDON SHEATH OR LIGAMENT (Right)     Surgeon(s) and Role:     * Montana Russ Jr., MD - Primary    Assisting Surgeon: None    Pre-Operative Diagnosis: Nontraumatic incomplete tear of left rotator cuff [M75.112]    Post-Operative Diagnosis: Post-Op Diagnosis Codes:     * Nontraumatic incomplete tear of left rotator cuff [M75.112]    Anesthesia: General/Regional    Operative Findings (including complications, if any):  Rotator cuff tear left shoulder with triggering right middle finger    Description of Technical Procedures:     Preop diagnosis: 1.  Left shoulder rotator cuff tear.      2. Left shoulder acromioclavicular arthritis.      3. Triggering of the right middle finger.      Postop diagnosis: Same.      Operative procedure:  1.  Left shoulder arthroscopy with subacromial decompression.      2. Left shoulder arthroscopic rotator cuff repair using opus suture anchor X 1.      3. Left shoulder arthroscopic acromioclavicular joint resection.      4. Injection tendon sheath right middle finger with Kenalog 20 mg 0.5 cc xylocaine.      Surgeon: Jeb.      First assistant: Carmina.  Medically necessary due to complexity of the procedure    EBL: Minimal.      Complications:  None.      Specimen:  None.      Operative procedure in detail as follows:     After operative consent was obtained patient brought the operating room placed supine operating table.  Anesthesia by general endotracheal method performed by the anesthesia staff.  After the patient was asleep the right hand was injected under sterile technique into the flexor tendon sheath right middle finger with combination Kenalog 20 mg 0.5 cc xylocaine sterile technique tolerated the procedure well.       Patient was then turned in lateral decubitus position  and stabilized on the bean bag.  The left shoulder and upper extremity prepped and draped out in the normal sterile fashion left arm suspended longitudinal traction 16 lb.      Posterolateral stab incision made around the left shoulder with a 15 blade.  The arthroscope inserted into the left shoulder joint and a diagnostic arthroscopy of the shoulder showed moderate degenerative changes of the glenohumeral joint.      There was a near complete tear of the rotator cuff as well.      The scope was reinserted in the subacromial space and a lateral portal created with a 15 blade and the sucker shaver inserted laterally a complete bursectomy performed including removal of the CA ligament.      A near complete rotator cuff tear was completed with the sucker shaver and the opus suture Passer used to place an inverted horizontal mattress sutures in the leading edge of the rotator cuff.  A medial working portal was created with a 15 blade and drilling into the greater tuberosity was followed by insertion of the opus anchor and passing the suture through the anchor.  The anchor was deployed locked in place and the suture tightened up bringing the rotator cuff down to bone flush where it was then locked in place and cut short.      Attention then turned to the acromion which had a large mobile fragment most likely a os acromiale this was causing impingement so was removed with the bur from lateral to medial which did also decompressed the AC joint from the acromial side after smoothing all bony surfaces on the acromial side the distal clavicle was coplaned inferiorly and removed partially to give a complete AC resection.      Hemostasis achieved with Bovie.  The instruments then removed and excess debris and fluid evacuated from the joint.  The portals then closed with interrupted 3-0 nylon suture sterile dressing applied followed by pillow sling.  The patient  extubated brought to the recovery room in stable condition all sponge needle counts reported as correct no complications    The 1st assistant was necessary on this case because it involved a rotator cuff repair and shoulder arthroscopy requiring a separate assistant to hold the scope while the rotator cuff was repaired    Significant Surgical Tasks Conducted by the Assistant(s), if Applicable: scope    Estimated Blood Loss (EBL): * No values recorded between 12/16/2022  2:13 PM and 12/16/2022  3:21 PM *           Implants: * No implants in log *    Specimens:   Specimen (24h ago, onward)      None                    Condition: Good    Disposition: PACU - hemodynamically stable.    Attestation: I was present and scrubbed for the entire procedure.    Discharge Note    OUTCOME: Patient tolerated treatment/procedure well without complication and is now ready for discharge.    DISPOSITION: Home or Self Care    FINAL DIAGNOSIS:  Nontraumatic incomplete tear of left rotator cuff    FOLLOWUP: In clinic    DISCHARGE INSTRUCTIONS:    Discharge Procedure Orders   Diet general     Call MD for:  temperature >100.4     Call MD for:  persistent nausea and vomiting     Call MD for:  severe uncontrolled pain     Ice to affected area   Order Comments: using barrier between ice and skin (specify duration&frequency)     Remove dressing in 24 hours

## 2022-12-16 NOTE — ANESTHESIA PROCEDURE NOTES
Peripheral Block    Patient location during procedure: holding area   Block not for primary anesthetic.  Reason for block: at surgeon's request and post-op pain management   Post-op Pain Location: shoulder pain      Staffing  Authorizing Provider: Dunia Cedeno MD  Performing Provider: Dunia Cedeno MD    Preanesthetic Checklist  Completed: patient identified, IV checked, site marked, risks and benefits discussed, surgical consent, monitors and equipment checked, pre-op evaluation and timeout performed  Peripheral Block  Patient position: supine  Prep: ChloraPrep and site prepped and draped  Patient monitoring: heart rate, cardiac monitor, continuous pulse ox, continuous capnometry and frequent blood pressure checks  Block type: interscalene  Laterality: left  Injection technique: continuous  Needle  Needle type: echogenic   Needle gauge: 18 G  Needle length: 3.5 in  Needle localization: ultrasound guidance and nerve stimulator  Needle insertion depth: 4 cm  Catheter type: stimulating  Catheter size: 20 G  Test dose: negative   -ultrasound image captured on disc.  Assessment  Injection assessment: negative aspiration, negative parasthesia and local visualized surrounding nerve  Paresthesia pain: none  Heart rate change: no  Slow fractionated injection: yes    Medications:    Medications: bupivacaine (pf) (MARCAINE) injection 0.5% - Perineural   10 mL - 12/16/2022 12:45:00 PM    Additional Notes  Clonidine 25mcg and decadron 1 mg added

## 2022-12-17 NOTE — TRANSFER OF CARE
Anesthesia Transfer of Care Note    Patient: Esa Yang    Procedure(s) Performed: Procedure(s) (LRB):  REPAIR, ROTATOR CUFF, ARTHROSCOPIC (Left)  ARTHROSCOPY, SHOULDER, WITH SUBACROMIAL SPACE DECOMPRESSION (Left)  INJECTION,TENDON SHEATH OR LIGAMENT,1 TENDON SHEATH OR LIGAMENT (Right)    Patient location: PACU    Anesthesia Type: general    Transport from OR: Transported from OR on 6-10 L/min O2 by face mask with adequate spontaneous ventilation    Post pain: adequate analgesia    Post assessment: no apparent anesthetic complications    Post vital signs: stable    Level of consciousness: awake, alert and oriented    Nausea/Vomiting: no nausea/vomiting    Complications: none    Transfer of care protocol was followed      Last vitals:   Visit Vitals  BP (!) 147/84   Pulse 68   Temp 36.5 °C (97.7 °F) (Temporal)   Resp 16   Ht 6' (1.829 m)   Wt 123.8 kg (272 lb 14.9 oz)   SpO2 100%   BMI 37.02 kg/m²

## 2022-12-17 NOTE — ANESTHESIA POSTPROCEDURE EVALUATION
Anesthesia Post Evaluation    Patient: Esa Yang    Procedure(s) Performed: Procedure(s) (LRB):  REPAIR, ROTATOR CUFF, ARTHROSCOPIC (Left)  ARTHROSCOPY, SHOULDER, WITH SUBACROMIAL SPACE DECOMPRESSION (Left)  INJECTION,TENDON SHEATH OR LIGAMENT,1 TENDON SHEATH OR LIGAMENT (Right)    Final Anesthesia Type: general      Patient location during evaluation: PACU  Patient participation: Yes- Able to Participate  Level of consciousness: awake and alert and oriented  Post-procedure vital signs: reviewed and stable  Pain management: adequate  Airway patency: patent    PONV status at discharge: No PONV  Anesthetic complications: no      Cardiovascular status: blood pressure returned to baseline, hemodynamically stable and stable  Respiratory status: unassisted, room air and spontaneous ventilation  Hydration status: euvolemic  Follow-up not needed.          Vitals Value Taken Time   /84 12/16/22 1730   Temp 36.5 °C (97.7 °F) 12/16/22 1730   Pulse 68 12/16/22 1730   Resp 16 12/16/22 1730   SpO2 100 % 12/16/22 1730         Event Time   Out of Recovery 16:11:26         Pain/Melissa Score: Pain Rating Prior to Med Admin: 6 (12/16/2022  3:56 PM)  Melissa Score: 10 (12/16/2022  5:29 PM)

## 2022-12-20 VITALS
SYSTOLIC BLOOD PRESSURE: 147 MMHG | DIASTOLIC BLOOD PRESSURE: 84 MMHG | BODY MASS INDEX: 36.97 KG/M2 | HEIGHT: 72 IN | WEIGHT: 272.94 LBS | OXYGEN SATURATION: 100 % | HEART RATE: 68 BPM | RESPIRATION RATE: 16 BRPM | TEMPERATURE: 98 F

## 2022-12-22 ENCOUNTER — HOSPITAL ENCOUNTER (EMERGENCY)
Facility: HOSPITAL | Age: 52
Discharge: HOME | End: 2022-12-22
Attending: EMERGENCY MEDICINE
Payer: COMMERCIAL

## 2022-12-22 ENCOUNTER — NURSE TRIAGE (OUTPATIENT)
Dept: ADMINISTRATIVE | Facility: CLINIC | Age: 52
End: 2022-12-22
Payer: COMMERCIAL

## 2022-12-22 VITALS
DIASTOLIC BLOOD PRESSURE: 100 MMHG | SYSTOLIC BLOOD PRESSURE: 166 MMHG | TEMPERATURE: 97 F | WEIGHT: 268 LBS | HEIGHT: 72 IN | BODY MASS INDEX: 36.3 KG/M2 | HEART RATE: 78 BPM | OXYGEN SATURATION: 100 % | RESPIRATION RATE: 18 BRPM

## 2022-12-22 DIAGNOSIS — K59.00 CONSTIPATION, UNSPECIFIED CONSTIPATION TYPE: ICD-10-CM

## 2022-12-22 DIAGNOSIS — R11.2 NAUSEA AND VOMITING, UNSPECIFIED VOMITING TYPE: Primary | ICD-10-CM

## 2022-12-22 LAB
ALBUMIN SERPL BCP-MCNC: 4.6 G/DL (ref 3.5–5.2)
ALP SERPL-CCNC: 118 U/L (ref 38–126)
ALT SERPL W/O P-5'-P-CCNC: 21 U/L (ref 10–44)
ANION GAP SERPL CALC-SCNC: 8 MMOL/L (ref 8–16)
AST SERPL-CCNC: 23 U/L (ref 15–46)
BASOPHILS # BLD AUTO: 0.02 K/UL (ref 0–0.2)
BASOPHILS NFR BLD: 0.2 % (ref 0–1.9)
BILIRUB SERPL-MCNC: 0.9 MG/DL (ref 0.1–1)
CALCIUM SERPL-MCNC: 9.4 MG/DL (ref 8.7–10.5)
CHLORIDE SERPL-SCNC: 100 MMOL/L (ref 95–110)
CO2 SERPL-SCNC: 30 MMOL/L (ref 23–29)
CREAT SERPL-MCNC: 1.25 MG/DL (ref 0.5–1.4)
DIFFERENTIAL METHOD: ABNORMAL
EOSINOPHIL # BLD AUTO: 0.1 K/UL (ref 0–0.5)
EOSINOPHIL NFR BLD: 0.9 % (ref 0–8)
ERYTHROCYTE [DISTWIDTH] IN BLOOD BY AUTOMATED COUNT: 13.9 % (ref 11.5–14.5)
EST. GFR  (NO RACE VARIABLE): >60 ML/MIN/1.73 M^2
GLUCOSE SERPL-MCNC: 109 MG/DL (ref 70–110)
HCT VFR BLD AUTO: 48 % (ref 40–54)
HGB BLD-MCNC: 15.9 G/DL (ref 14–18)
IMM GRANULOCYTES # BLD AUTO: 0.21 K/UL (ref 0–0.04)
IMM GRANULOCYTES NFR BLD AUTO: 1.7 % (ref 0–0.5)
LYMPHOCYTES # BLD AUTO: 2.1 K/UL (ref 1–4.8)
LYMPHOCYTES NFR BLD: 16.7 % (ref 18–48)
MCH RBC QN AUTO: 26.3 PG (ref 27–31)
MCHC RBC AUTO-ENTMCNC: 33.1 G/DL (ref 32–36)
MCV RBC AUTO: 80 FL (ref 82–98)
MONOCYTES # BLD AUTO: 0.8 K/UL (ref 0.3–1)
MONOCYTES NFR BLD: 6.3 % (ref 4–15)
NEUTROPHILS # BLD AUTO: 9.4 K/UL (ref 1.8–7.7)
NEUTROPHILS NFR BLD: 74.2 % (ref 38–73)
NRBC BLD-RTO: 0 /100 WBC
PLATELET # BLD AUTO: 268 K/UL (ref 150–450)
PMV BLD AUTO: 10.6 FL (ref 9.2–12.9)
POTASSIUM SERPL-SCNC: 4.2 MMOL/L (ref 3.5–5.1)
PROT SERPL-MCNC: 8.4 G/DL (ref 6–8.4)
RBC # BLD AUTO: 6.04 M/UL (ref 4.6–6.2)
SODIUM SERPL-SCNC: 138 MMOL/L (ref 136–145)
UUN UR-MCNC: 22 MG/DL (ref 2–20)
WBC # BLD AUTO: 12.68 K/UL (ref 3.9–12.7)

## 2022-12-22 PROCEDURE — 99284 EMERGENCY DEPT VISIT MOD MDM: CPT | Mod: 25,ER

## 2022-12-22 PROCEDURE — 85025 COMPLETE CBC W/AUTO DIFF WBC: CPT | Mod: ER | Performed by: EMERGENCY MEDICINE

## 2022-12-22 PROCEDURE — 80053 COMPREHEN METABOLIC PANEL: CPT | Mod: ER | Performed by: EMERGENCY MEDICINE

## 2022-12-22 PROCEDURE — 96360 HYDRATION IV INFUSION INIT: CPT | Mod: ER

## 2022-12-22 PROCEDURE — 25000003 PHARM REV CODE 250: Mod: ER | Performed by: EMERGENCY MEDICINE

## 2022-12-22 RX ORDER — SODIUM CHLORIDE 9 MG/ML
INJECTION, SOLUTION INTRAVENOUS
Status: COMPLETED | OUTPATIENT
Start: 2022-12-22 | End: 2022-12-22

## 2022-12-22 RX ORDER — PROMETHAZINE HYDROCHLORIDE 25 MG/1
25 TABLET ORAL EVERY 6 HOURS PRN
Qty: 15 TABLET | Refills: 0 | Status: ON HOLD | OUTPATIENT
Start: 2022-12-22 | End: 2023-02-13 | Stop reason: HOSPADM

## 2022-12-22 RX ADMIN — SODIUM CHLORIDE: 0.9 INJECTION, SOLUTION INTRAVENOUS at 07:12

## 2022-12-22 NOTE — TELEPHONE ENCOUNTER
" I rec'd a call from Mr. Yang' wife, he had a rotator cuff repair with you on 12/16.  he is having issues with constipation and nausea/vomiting.  He has had several small stools over the last week, but does report that his stools have been narrow/pencil-like in shape.  I have gone over step-by-step instructions to manage his constipation at home, his wife is an RN and verbalized understanding.  He is afebrile and not having any acute abdominal pain or distention, and denies any acute rectal pain.  This am around 0400 he began with vomiting, estimates 5 episodes, there is no blood or bile in his vomit.  They are requesting a rx for nausea be called in for him.  [facility] in [place] [address] is the pharmacy of choice, listed in his profile.    I sent a SC message to Dr. Russ, he responded:  He needs to call his PCP. He may have more than 1 problem here.  Or ER.  Tell him he may be so constipated that he has an ileus ( gut shut down). That needs ER.  I called and s/w Mrs. Yang, advised that Dr. Russ is advising that he go to the ED now to be evaluated for an ileus.  She verbalized understanding.        Reason for Disposition   Severe headache (e.g., excruciating) (Exception: similar to previous migraines)   Taking new prescription medication   [1] Vomiting AND [2] present < 4 hours    Additional Information   Negative: Shock suspected (e.g., cold/pale/clammy skin, too weak to stand, low BP, rapid pulse)   Negative: Difficult to awaken or acting confused (e.g., disoriented, slurred speech)   Negative: Sounds like a life-threatening emergency to the triager   Negative: [1] Vomiting AND [2] contains red blood or black ("coffee ground") material  (Exception: few red streaks in vomit that only happened once)   Negative: Severe pain in one eye   Negative: Recent head injury (within last 3 days)   Negative: Recent abdominal injury (within last 3 days)   Negative: [1] Insulin-dependent diabetes (Type I) AND [2] " glucose > 400 mg/dl (22 mmol/l)   Negative: [1] Vomiting AND [2] hernia is more painful or swollen than usual   Negative: [1] SEVERE vomiting (e.g., 6 or more times/day) AND [2] present > 8 hours (Exception: patient sounds well, is drinking liquids, does not sound dehydrated, and vomiting has lasted less than 24 hours)   Negative: [1] MODERATE vomiting (e.g., 3 - 5 times/day) AND [2] age > 60 years   Negative: Sounds like a life-threatening emergency to the triager   Negative: [1] Widespread rash AND [2] bright red, sunburn-like   Negative: [1] SEVERE headache AND [2] after spinal (epidural) anesthesia   Negative: [1] Vomiting AND [2] persists > 4 hours   Negative: [1] Vomiting AND [2] abdomen looks much more swollen than usual   Negative: [1] Drinking very little AND [2] dehydration suspected (e.g., no urine > 12 hours, very dry mouth, very lightheaded)   Negative: Patient sounds very sick or weak to the triager   Negative: Sounds like a serious complication to the triager   Negative: Fever > 100.4 F (38.0 C)   Negative: [1] SEVERE post-op pain (e.g., excruciating, pain scale 8-10) AND [2] not controlled with pain medications   Negative: [1] Caller has URGENT question AND [2] triager unable to answer question   Negative: [1] Headache AND [2] after spinal (epidural) anesthesia AND [3] not severe   Negative: Fever present > 3 days (72 hours)   Constipation   Negative: [1] Abdomen pain is main symptom AND [2] male   Negative: [1] Abdomen pain is main symptom AND [2] adult female   Negative: Rectal bleeding or blood in stool is main symptom   Negative: Rectal pain or itching is main symptom   Negative: Constipation in a cancer patient who is currently (or recently) receiving chemotherapy or radiation therapy, or cancer patient who has metastatic or end-stage cancer and is receiving palliative care   Negative: Patient sounds very sick or weak to the triager   Negative: [1] Vomiting AND [2] abdomen looks much more  swollen than usual   Negative: [1] Vomiting AND [2] contains bile (green color)   Negative: [1] Constant abdominal pain AND [2] present > 2 hours   Negative: [1] Rectal pain or fullness from fecal impaction (rectum full of stool) AND [2] NOT better after SITZ bath, suppository or enema   Negative: [1] Intermittent mild abdominal pain AND [2] fever   Negative: Abdomen is more swollen than usual   Negative: Last bowel movement (BM) > 4 days ago   Negative: Leaking stool   Negative: Unable to have a bowel movement (BM) without manually removing stool (using finger to pull out stool or perform disimpaction)   Negative: Unable to have a bowel movement (BM) without laxative or enema   Negative: [1] Constipation persists > 1 week AND [2] no improvement after using CARE ADVICE   Negative: [1] Weight loss > 10 pounds (5 kg) AND [2] not dieting   Negative: Pencil-like, narrow stools   Negative: [1] MILD-MODERATE post-op pain (e.g., pain scale 1-7) AND [2] not controlled with pain medications   Negative: [1] Caller has NON-URGENT question AND [2] triager unable to answer question   Negative: General activity, questions about   Negative: Resuming driving, questions about   Negative: Resuming sexual relations, questions about   Negative: Getting the incision wet, questions about   Negative: Throat pain after surgery, questions about    Protocols used: Vomiting-A-AH, Post-Op Symptoms and [id number], Constipation-A-AH

## 2022-12-22 NOTE — ED PROVIDER NOTES
"Encounter Date: 12/22/2022       History     Chief Complaint   Patient presents with    Nausea    Vomiting    Constipation     Pt in with c/o n/v. Reports started at about 0200 this a.m. Reports had left rotator cuff shoulder on Friday and surgeon concerned about description of stool so wouldn't give Rx for n/v. Describes stool as pencil thin on yesterday and mila this a.m. Reports took "outdated" Phenergan at about 0530       52-year-old male with a history of GERD status post left shoulder surgery last week presents with nausea vomiting since this morning.  Patient says that yesterday his stool was described as "pencil thin" and today it was "mila."  Patient contacted his orthopedic surgeon for prescription for nausea medicine however he wanted him to be evaluated in the emergency department 1st.  No fever, abdominal pain. Patient has been occasionally taking pain medications since surgery.   He took Phenergan this morning but wife says it is outdated.    Review of patient's allergies indicates:   Allergen Reactions    Sulfa (sulfonamide antibiotics) Swelling    Neosporin (neomycin-polymyx) Rash     Topical irritation     Aspirin Swelling    Latex, natural rubber Hives and Itching    Shellfish containing products Swelling     Past Medical History:   Diagnosis Date    Allergy     Back pain     GERD (gastroesophageal reflux disease)      Past Surgical History:   Procedure Laterality Date    ANKLE SURGERY Left     ARTHROSCOPIC REPAIR OF ROTATOR CUFF OF SHOULDER Left 12/16/2022    Procedure: REPAIR, ROTATOR CUFF, ARTHROSCOPIC;  Surgeon: Montana Russ Jr., MD;  Location: [facility] OR;  Service: Orthopedics;  Laterality: Left;  need opus system Restorationist notified cc    ARTHROSCOPY OF SHOULDER WITH DECOMPRESSION OF SUBACROMIAL SPACE Left 12/16/2022    Procedure: ARTHROSCOPY, SHOULDER, WITH SUBACROMIAL SPACE DECOMPRESSION;  Surgeon: Montana Russ Jr., MD;  Location: [facility] OR;  Service: Orthopedics;  Laterality: " Left;    BACK SURGERY  01/30/2017    INJECTION, TENDON SHEATH OR LIGAMENT, 1 TENDON SHEATH OR LIGAMENT Right 12/16/2022    Procedure: INJECTION,TENDON SHEATH OR LIGAMENT,1 TENDON SHEATH OR LIGAMENT;  Surgeon: Montana Russ Jr., MD;  Location: [facility] OR;  Service: Orthopedics;  Laterality: Right;    KNEE SURGERY      x 5    VASECTOMY       Family History   Problem Relation Age of Onset    Prostate cancer Father     Kidney cancer Neg Hx      Social History     Tobacco Use    Smoking status: Never    Smokeless tobacco: Never   Substance Use Topics    Alcohol use: Yes     Comment: occasional    Drug use: No     Review of Systems   Constitutional:  Negative for appetite change.   Eyes:  Negative for pain.   Respiratory:  Negative for shortness of breath.    Cardiovascular:  Negative for chest pain.   Gastrointestinal:  Positive for constipation, nausea and vomiting. Negative for abdominal pain.   Genitourinary:  Negative for frequency.   Musculoskeletal:  Negative for arthralgias and neck pain.   Neurological:  Negative for headaches.   Psychiatric/Behavioral:  Negative for confusion.      Physical Exam     Initial Vitals [12/22/22 0717]   BP Pulse Resp Temp SpO2   (!) 166/92 80 20 97.4 °F (36.3 °C) 99 %      MAP       --         Physical Exam    Nursing note and vitals reviewed.  HENT:   Head: Atraumatic.   Eyes: Conjunctivae and EOM are normal.   Neck:   Normal range of motion.  Cardiovascular:      Exam reveals no gallop and no friction rub.       No murmur heard.  Pulmonary/Chest: Breath sounds normal. No respiratory distress. He has no wheezes. He has no rales.   Abdominal: Abdomen is soft. Bowel sounds are normal. He exhibits no distension. There is no abdominal tenderness.   Musculoskeletal:         General: No edema. Normal range of motion.      Cervical back: Normal range of motion.      Comments: Left upper extremity in sling     Neurological: He is alert and oriented to person, place, and time.   Skin: Skin  is warm and dry.   Psychiatric: He has a normal mood and affect.       ED Course   Procedures  Labs Reviewed   CBC W/ AUTO DIFFERENTIAL - Abnormal; Notable for the following components:       Result Value    MCV 80 (*)     MCH 26.3 (*)     Immature Granulocytes 1.7 (*)     Gran # (ANC) 9.4 (*)     Immature Grans (Abs) 0.21 (*)     Gran % 74.2 (*)     Lymph % 16.7 (*)     All other components within normal limits   COMPREHENSIVE METABOLIC PANEL - Abnormal; Notable for the following components:    CO2 30 (*)     BUN 22 (*)     All other components within normal limits          Imaging Results              CT Abdomen Pelvis  Without Contrast (Final result)  Result time 12/22/22 08:37:53      Final result by Keith Painter MD (12/22/22 08:37:53)                   Impression:      Mild gallbladder distention. No appreciable pericholecystic fluid. Consider further evaluation with right upper quadrant ultrasound as clinically warranted.    Moderate-to-large volume stool throughout the colon.      Electronically signed by: Keith Painter  Date:    12/22/2022  Time:    08:37               Narrative:    EXAMINATION:  CT ABDOMEN PELVIS WITHOUT CONTRAST    CLINICAL HISTORY:  Nausea/vomiting;    TECHNIQUE:  Low dose axial images, sagittal and coronal reformations were obtained from the lung bases to the pubic symphysis.  Contrast was not administered.    All CT scans at this location are performed using dose modulation techniques as appropriate to a performed exam including the following: Automated exposure control; adjustment of the mA and/or kV according to patient size.    COMPARISON:  09/09/2018    FINDINGS:  Heart: Normal in size. No pericardial effusion.    Lung Bases: Well aerated, without consolidation or pleural fluid.    Liver: Normal in size and attenuation, with no focal hepatic lesions.    Gallbladder: Mild gallbladder distention.  No appreciable pericholecystic fluid.  Consider further evaluation with right upper  quadrant ultrasound as clinically warranted.    Bile Ducts: No evidence of dilated ducts.    Pancreas: No mass or peripancreatic fat stranding.    Spleen: Unremarkable.    Adrenals: Unremarkable.    Kidneys/ Ureters: Unremarkable.    Bladder: No evidence of wall thickening.    Reproductive organs: Unremarkable.    GI Tract/Mesentery: Distal esophagus, stomach,  small bowel unremarkable.  Small duodenal diverticulum.  Normal appendix.  Moderate-to-large volume stool throughout the colon.    Peritoneal Space: No ascites. No free air.    Retroperitoneum: No significant adenopathy.    Abdominal wall: Unremarkable.    Vasculature: No significant atherosclerosis or aneurysm.    Bones: No acute fracture.  Posterior lumbar fixation hardware L3 through L5 with intervertebral disc spacers.                                       Medications   0.9%  NaCl infusion ( Intravenous New Bag 12/22/22 0746)     Medical Decision Making:   Initial Assessment:     52-year-old male status post shoulder surgery presenting with nausea vomiting and constipation.  Vital signs unremarkable.  Abdomen soft and nontender. IV fluids started.    Labs unremarkable.  CT abdomen pelvis shows no evidence of obstruction or ileus.  There is a moderate-to-large volume of stool consistent with patient's history of constipation.  Of note, there is some mild gallbladder distention without pericholecystic fluid or obvious gallstones.  LFTs are normal.  I do not think that the patient's nausea vomiting is representative cholecystitis.  Patient was given IV fluids.  I think he can be discharged home to continue stool softeners and limit opiates as much as tolerated.  He should follow up with Orthopedic surgery.  Return   Instructions given                        Clinical Impression:   Final diagnoses:  [R11.2] Nausea and vomiting, unspecified vomiting type (Primary)  [K59.00] Constipation, unspecified constipation type        ED Disposition Condition    Discharge  Stable          ED Prescriptions       Medication Sig Dispense Start Date End Date Auth. Provider    promethazine (PHENERGAN) 25 MG tablet Take 1 tablet (25 mg total) by mouth every 6 (six) hours as needed for Nausea. 15 tablet 12/22/2022 -- Sergey Salgado MD          Follow-up Information       Follow up With Specialties Details Why Contact Info    Montana Russ Jr., MD Hand Surgery, Orthopedic Surgery Schedule an appointment as soon as possible for a visit in 3 days  [address]  [address] [place] [id number]  [phone]               Sergey Salgado MD  12/22/22 [id number]

## 2022-12-22 NOTE — ED NOTES
Pt resting quietly, eyes closed, respirations even and with ease fluids continue to infuse.   Spouse remains at bedside; updated on CT results not being back at this time. Verbalized understanding.

## 2022-12-29 ENCOUNTER — OFFICE VISIT (OUTPATIENT)
Dept: ORTHOPEDICS | Facility: CLINIC | Age: 52
End: 2022-12-29
Payer: COMMERCIAL

## 2022-12-29 VITALS — WEIGHT: 268 LBS | BODY MASS INDEX: 36.3 KG/M2 | HEIGHT: 72 IN

## 2022-12-29 DIAGNOSIS — M75.112 NONTRAUMATIC INCOMPLETE TEAR OF LEFT ROTATOR CUFF: ICD-10-CM

## 2022-12-29 DIAGNOSIS — M75.120 COMPLETE TEAR OF ROTATOR CUFF, UNSPECIFIED LATERALITY, UNSPECIFIED WHETHER TRAUMATIC: Primary | ICD-10-CM

## 2022-12-29 PROCEDURE — 3008F PR BODY MASS INDEX (BMI) DOCUMENTED: ICD-10-PCS | Mod: CPTII,S$GLB,, | Performed by: ORTHOPAEDIC SURGERY

## 2022-12-29 PROCEDURE — 1159F MED LIST DOCD IN RCRD: CPT | Mod: CPTII,S$GLB,, | Performed by: ORTHOPAEDIC SURGERY

## 2022-12-29 PROCEDURE — 99999 PR PBB SHADOW E&M-EST. PATIENT-LVL III: CPT | Mod: PBBFAC,,, | Performed by: ORTHOPAEDIC SURGERY

## 2022-12-29 PROCEDURE — 1159F PR MEDICATION LIST DOCUMENTED IN MEDICAL RECORD: ICD-10-PCS | Mod: CPTII,S$GLB,, | Performed by: ORTHOPAEDIC SURGERY

## 2022-12-29 PROCEDURE — 99024 PR POST-OP FOLLOW-UP VISIT: ICD-10-PCS | Mod: S$GLB,,, | Performed by: ORTHOPAEDIC SURGERY

## 2022-12-29 PROCEDURE — 99024 POSTOP FOLLOW-UP VISIT: CPT | Mod: S$GLB,,, | Performed by: ORTHOPAEDIC SURGERY

## 2022-12-29 PROCEDURE — 3008F BODY MASS INDEX DOCD: CPT | Mod: CPTII,S$GLB,, | Performed by: ORTHOPAEDIC SURGERY

## 2022-12-29 PROCEDURE — 99999 PR PBB SHADOW E&M-EST. PATIENT-LVL III: ICD-10-PCS | Mod: PBBFAC,,, | Performed by: ORTHOPAEDIC SURGERY

## 2022-12-29 RX ORDER — TRAMADOL HYDROCHLORIDE 50 MG/1
50 TABLET ORAL EVERY 6 HOURS PRN
Qty: 40 TABLET | Refills: 0 | Status: SHIPPED | OUTPATIENT
Start: 2022-12-29 | End: 2023-01-08

## 2022-12-29 NOTE — PROGRESS NOTES
Subjective:      Patient ID: Esa Yang is a 52 y.o. male.  Chief Complaint: Post-op Evaluation of the Left Shoulder      HPI  Esa Yang is a  52 y.o. male presenting today for post op visit.  He is s/p cuff repair left shoulder  He is 2 weeks postop is doing well.     Review of patient's allergies indicates:   Allergen Reactions    Sulfa (sulfonamide antibiotics) Swelling    Neosporin (neomycin-polymyx) Rash     Topical irritation     Aspirin Swelling    Latex, natural rubber Hives and Itching    Shellfish containing products Swelling         Current Outpatient Medications   Medication Sig Dispense Refill    erythromycin (ROMYCIN) ophthalmic ointment Place a 1/2 inch ribbon of ointment into the lower eyelid of each eye 4 times daily. 3.5 g 0    gabapentin (NEURONTIN) 100 MG capsule Take 100 mg by mouth 3 (three) times daily.      oxyCODONE-acetaminophen (PERCOCET) 7.5-325 mg per tablet Take 1 tablet by mouth every 4 (four) hours as needed for Pain. 40 tablet 0    pep injection Inject .25 ml as directed     For compounding pharmacy use:   Add PAPAVERINE 30 mcg  Add PHENTOLAMINE 10 mg  Add ALPROSTADIL 100 mcg 2 vial 11    promethazine (PHENERGAN) 25 MG tablet Take 1 tablet (25 mg total) by mouth every 6 (six) hours as needed for Nausea. 15 tablet 0    diazePAM (VALIUM) 5 MG tablet Take 1 tablet (5 mg total) by mouth every 6 (six) hours as needed for Anxiety. 4 tablet 0    tamsulosin (FLOMAX) 0.4 mg Cap Take 1 capsule (0.4 mg total) by mouth once daily. 30 capsule 11    testosterone cypionate (DEPOTESTOTERONE CYPIONATE) 200 mg/mL injection Inject 1 mL (200 mg total) into the muscle every 14 (fourteen) days. 10 mL 1    traMADoL (ULTRAM) 50 mg tablet Take 1 tablet (50 mg total) by mouth every 6 (six) hours as needed for Pain. 40 tablet 0     No current facility-administered medications for this visit.     Facility-Administered Medications Ordered in Other Visits   Medication Dose Route Frequency Provider  Last Rate Last Admin    triamcinolone acetonide injection 40 mg  40 mg Intra-articular  Sergey Kruse MD   40 mg at 11/11/14 1327       Past Medical History:   Diagnosis Date    Allergy     Back pain     GERD (gastroesophageal reflux disease)        Past Surgical History:   Procedure Laterality Date    ANKLE SURGERY Left     ARTHROSCOPIC REPAIR OF ROTATOR CUFF OF SHOULDER Left 12/16/2022    Procedure: REPAIR, ROTATOR CUFF, ARTHROSCOPIC;  Surgeon: Montana Russ Jr., MD;  Location: [facility] OR;  Service: Orthopedics;  Laterality: Left;  need opus system Scientologist notified cc    ARTHROSCOPY OF SHOULDER WITH DECOMPRESSION OF SUBACROMIAL SPACE Left 12/16/2022    Procedure: ARTHROSCOPY, SHOULDER, WITH SUBACROMIAL SPACE DECOMPRESSION;  Surgeon: Montana Russ Jr., MD;  Location: [facility] OR;  Service: Orthopedics;  Laterality: Left;    BACK SURGERY  01/30/2017    INJECTION, TENDON SHEATH OR LIGAMENT, 1 TENDON SHEATH OR LIGAMENT Right 12/16/2022    Procedure: INJECTION,TENDON SHEATH OR LIGAMENT,1 TENDON SHEATH OR LIGAMENT;  Surgeon: Montana Russ Jr., MD;  Location: [facility] OR;  Service: Orthopedics;  Laterality: Right;    KNEE SURGERY      x 5    VASECTOMY         OBJECTIVE:   PHYSICAL EXAM:  Height: 6' (182.9 cm) Weight: 121.6 kg (268 lb)  Vitals:    12/29/22 1513   Weight: 121.6 kg (268 lb)   Height: 6' (1.829 m)   PainSc:   6     Ortho/SPM Exam  Examination left shoulder incisions healing well sutures in place no evidence of infection range of motion limited due to pain    RADIOGRAPHS:  None  Comments: I have personally reviewed the imaging and I agree with the above radiologist's report.    ASSESSMENT/PLAN:     IMPRESSION:  Status post rotator cuff repair left shoulder    PLAN:  Sutures removed   Instructed in appropriate wound care   He can wean out of the sling at home still wear it when he is up leaves the house   PT ordered   No heavy lifting   Ultram for pain       FOLLOW UP:  3-4 weeks    Disclaimer: This  note has been generated using voice-recognition software. There may be typographical errors that have been missed during proof-reading.

## 2023-01-23 ENCOUNTER — TELEPHONE (OUTPATIENT)
Dept: SPORTS MEDICINE | Facility: CLINIC | Age: 53
End: 2023-01-23
Payer: COMMERCIAL

## 2023-01-23 ENCOUNTER — TELEPHONE (OUTPATIENT)
Dept: ORTHOPEDICS | Facility: CLINIC | Age: 53
End: 2023-01-23
Payer: COMMERCIAL

## 2023-01-23 NOTE — TELEPHONE ENCOUNTER
----- Message from Joy Burr sent at 1/23/2023 11:58 AM CST -----  Regarding: Patient advice  Contact: Pt  Pt returning call in regards to scheduling an appointment      Pt can be reached at  [phone]
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
## 2023-01-25 NOTE — PROGRESS NOTES
Subjective:      Patient ID: Esa Yang is a 53 y.o. male.  Chief Complaint: Post-op Evaluation (L Shoulder )      HPI  Esa Yang is a  53 y.o. male presenting today for a 6wk post op visit.      Operative procedure:  1.  Left shoulder arthroscopy with subacromial decompression.    2. Left shoulder arthroscopic rotator cuff repair using opus suture anchor X 1.    3. Left shoulder arthroscopic acromioclavicular joint resection.    4. Injection tendon sheath right middle finger with Kenalog 20 mg 0.5 cc xylocaine.      Surgeon: French  DOS: 12-    The patient is doing very well.  He continues to attend physical therapy for shoulder range-of-motion exercises 2 times weekly.  He has not begun a strengthening program.  He endorses intermittent shoulder soreness after increased activity, but denies uncontrolled pain.    Review of patient's allergies indicates:   Allergen Reactions    Sulfa (sulfonamide antibiotics) Swelling    Neosporin (neomycin-polymyx) Rash     Topical irritation     Aspirin Swelling    Latex, natural rubber Hives and Itching    Shellfish containing products Swelling         Current Outpatient Medications   Medication Sig Dispense Refill    tamsulosin (FLOMAX) 0.4 mg Cap Take 1 capsule (0.4 mg total) by mouth once daily. 30 capsule 11    diazePAM (VALIUM) 5 MG tablet Take 1 tablet (5 mg total) by mouth every 6 (six) hours as needed for Anxiety. 4 tablet 0    erythromycin (ROMYCIN) ophthalmic ointment Place a 1/2 inch ribbon of ointment into the lower eyelid of each eye 4 times daily. (Patient not taking: Reported on 1/30/2023) 3.5 g 0    gabapentin (NEURONTIN) 100 MG capsule Take 100 mg by mouth 3 (three) times daily.      oxyCODONE-acetaminophen (PERCOCET) 7.5-325 mg per tablet Take 1 tablet by mouth every 4 (four) hours as needed for Pain. (Patient not taking: Reported on 1/30/2023) 40 tablet 0    pep injection Inject .25 ml as directed     For compounding pharmacy use:   Add  PAPAVERINE 30 mcg  Add PHENTOLAMINE 10 mg  Add ALPROSTADIL 100 mcg 2 vial 11    promethazine (PHENERGAN) 25 MG tablet Take 1 tablet (25 mg total) by mouth every 6 (six) hours as needed for Nausea. (Patient not taking: Reported on 1/30/2023) 15 tablet 0    testosterone cypionate (DEPOTESTOTERONE CYPIONATE) 200 mg/mL injection Inject 1 mL (200 mg total) into the muscle every 14 (fourteen) days. 10 mL 1     No current facility-administered medications for this visit.     Facility-Administered Medications Ordered in Other Visits   Medication Dose Route Frequency Provider Last Rate Last Admin    triamcinolone acetonide injection 40 mg  40 mg Intra-articular  Sergey Kruse MD   40 mg at 11/11/14 1327       Past Medical History:   Diagnosis Date    Allergy     Back pain     GERD (gastroesophageal reflux disease)        Past Surgical History:   Procedure Laterality Date    ANKLE SURGERY Left     ARTHROSCOPIC REPAIR OF ROTATOR CUFF OF SHOULDER Left 12/16/2022    Procedure: REPAIR, ROTATOR CUFF, ARTHROSCOPIC;  Surgeon: Montana Russ Jr., MD;  Location: [facility] OR;  Service: Orthopedics;  Laterality: Left;  need opus system Quaker notified cc    ARTHROSCOPY OF SHOULDER WITH DECOMPRESSION OF SUBACROMIAL SPACE Left 12/16/2022    Procedure: ARTHROSCOPY, SHOULDER, WITH SUBACROMIAL SPACE DECOMPRESSION;  Surgeon: Montana Russ Jr., MD;  Location: [facility] OR;  Service: Orthopedics;  Laterality: Left;    BACK SURGERY  01/30/2017    INJECTION, TENDON SHEATH OR LIGAMENT, 1 TENDON SHEATH OR LIGAMENT Right 12/16/2022    Procedure: INJECTION,TENDON SHEATH OR LIGAMENT,1 TENDON SHEATH OR LIGAMENT;  Surgeon: Montana Russ Jr., MD;  Location: [facility] OR;  Service: Orthopedics;  Laterality: Right;    KNEE SURGERY      x 5    VASECTOMY         OBJECTIVE:   PHYSICAL EXAM:  Height: 6' (182.9 cm) Weight: 121.6 kg (268 lb)  Vitals:    01/30/23 1018   Weight: 121.6 kg (268 lb)   Height: 6' (1.829 m)   PainSc:   4       Ortho/SPM  Exam  Examination left shoulder   Surgical incisions are well healed with appropriate scar formation   AROM: Forward flexion to 120°, external rotation to 45°, abduction to 90°  Motor strength: 4/5 to supraspinatus testing compared to contralateral extremity.  5/5 biceps testing  Sensation grossly intact distally  NTTP to soft tissues and bony prominences throughout shoulder girdle  Brisk cap refill to finger pads  Radial pulse palpable    RADIOGRAPHS:  No new imaging obtained today    ASSESSMENT/PLAN:     IMPRESSION:  Status post rotator cuff repair left shoulder    PLAN:    The patient has excellent range of motion today.  He was encouraged to continue formal physical therapy with emphasis on ROM in the external rotation direction.  PT: Formal referral update provided.  No strengthening until 10-12 weeks  Pain control: Rest, ice, PT, we discussed intermittent soreness to shoulder is expected  Work release:  The patient continues to work diligently with physical therapy.  He will not begin any strengthening until apprx 3 months postop.  No lifting, pushing or pulling with the left shoulder greater than 5 lb at this time.  Okay to return to sedentary desk activities only.  We will re-evaluate his work restrictions at his next appointment in 6 weeks    FOLLOW UP: 6wks

## 2023-01-30 ENCOUNTER — OFFICE VISIT (OUTPATIENT)
Dept: ORTHOPEDICS | Facility: CLINIC | Age: 53
End: 2023-01-30
Payer: COMMERCIAL

## 2023-01-30 VITALS — BODY MASS INDEX: 36.3 KG/M2 | HEIGHT: 72 IN | WEIGHT: 268 LBS

## 2023-01-30 DIAGNOSIS — Z98.890 S/P ROTATOR CUFF REPAIR: Primary | ICD-10-CM

## 2023-01-30 PROCEDURE — 99024 PR POST-OP FOLLOW-UP VISIT: ICD-10-PCS | Mod: S$GLB,,, | Performed by: PHYSICIAN ASSISTANT

## 2023-01-30 PROCEDURE — 3008F PR BODY MASS INDEX (BMI) DOCUMENTED: ICD-10-PCS | Mod: CPTII,S$GLB,, | Performed by: PHYSICIAN ASSISTANT

## 2023-01-30 PROCEDURE — 99999 PR PBB SHADOW E&M-EST. PATIENT-LVL IV: CPT | Mod: PBBFAC,,, | Performed by: PHYSICIAN ASSISTANT

## 2023-01-30 PROCEDURE — 1159F MED LIST DOCD IN RCRD: CPT | Mod: CPTII,S$GLB,, | Performed by: PHYSICIAN ASSISTANT

## 2023-01-30 PROCEDURE — 99999 PR PBB SHADOW E&M-EST. PATIENT-LVL IV: ICD-10-PCS | Mod: PBBFAC,,, | Performed by: PHYSICIAN ASSISTANT

## 2023-01-30 PROCEDURE — 1160F PR REVIEW ALL MEDS BY PRESCRIBER/CLIN PHARMACIST DOCUMENTED: ICD-10-PCS | Mod: CPTII,S$GLB,, | Performed by: PHYSICIAN ASSISTANT

## 2023-01-30 PROCEDURE — 1160F RVW MEDS BY RX/DR IN RCRD: CPT | Mod: CPTII,S$GLB,, | Performed by: PHYSICIAN ASSISTANT

## 2023-01-30 PROCEDURE — 99024 POSTOP FOLLOW-UP VISIT: CPT | Mod: S$GLB,,, | Performed by: PHYSICIAN ASSISTANT

## 2023-01-30 PROCEDURE — 3008F BODY MASS INDEX DOCD: CPT | Mod: CPTII,S$GLB,, | Performed by: PHYSICIAN ASSISTANT

## 2023-01-30 PROCEDURE — 1159F PR MEDICATION LIST DOCUMENTED IN MEDICAL RECORD: ICD-10-PCS | Mod: CPTII,S$GLB,, | Performed by: PHYSICIAN ASSISTANT

## 2023-02-07 ENCOUNTER — TELEPHONE (OUTPATIENT)
Dept: SPORTS MEDICINE | Facility: CLINIC | Age: 53
End: 2023-02-07
Payer: COMMERCIAL

## 2023-02-09 ENCOUNTER — HOSPITAL ENCOUNTER (OUTPATIENT)
Dept: RADIOLOGY | Facility: HOSPITAL | Age: 53
Discharge: HOME | End: 2023-02-09
Attending: PHYSICIAN ASSISTANT
Payer: COMMERCIAL

## 2023-02-09 ENCOUNTER — OFFICE VISIT (OUTPATIENT)
Dept: SPORTS MEDICINE | Facility: CLINIC | Age: 53
End: 2023-02-09
Payer: COMMERCIAL

## 2023-02-09 VITALS
WEIGHT: 278 LBS | BODY MASS INDEX: 37.65 KG/M2 | SYSTOLIC BLOOD PRESSURE: 141 MMHG | HEIGHT: 72 IN | DIASTOLIC BLOOD PRESSURE: 94 MMHG | HEART RATE: 88 BPM

## 2023-02-09 DIAGNOSIS — M25.462 EFFUSION OF LEFT KNEE JOINT: ICD-10-CM

## 2023-02-09 DIAGNOSIS — M17.12 PRIMARY OSTEOARTHRITIS OF LEFT KNEE: ICD-10-CM

## 2023-02-09 DIAGNOSIS — M25.562 ACUTE PAIN OF LEFT KNEE: Primary | ICD-10-CM

## 2023-02-09 DIAGNOSIS — M25.562 LEFT KNEE PAIN, UNSPECIFIED CHRONICITY: ICD-10-CM

## 2023-02-09 DIAGNOSIS — Z98.890 S/P ACL RECONSTRUCTION: ICD-10-CM

## 2023-02-09 PROCEDURE — 3080F DIAST BP >= 90 MM HG: CPT | Mod: CPTII,S$GLB,, | Performed by: PHYSICIAN ASSISTANT

## 2023-02-09 PROCEDURE — 73564 X-RAY EXAM KNEE 4 OR MORE: CPT | Mod: TC,50

## 2023-02-09 PROCEDURE — 99214 OFFICE O/P EST MOD 30 MIN: CPT | Mod: 25,S$GLB,, | Performed by: PHYSICIAN ASSISTANT

## 2023-02-09 PROCEDURE — 99999 PR PBB SHADOW E&M-EST. PATIENT-LVL IV: ICD-10-PCS | Mod: PBBFAC,,, | Performed by: PHYSICIAN ASSISTANT

## 2023-02-09 PROCEDURE — 3077F PR MOST RECENT SYSTOLIC BLOOD PRESSURE >= 140 MM HG: ICD-10-PCS | Mod: CPTII,S$GLB,, | Performed by: PHYSICIAN ASSISTANT

## 2023-02-09 PROCEDURE — 3077F SYST BP >= 140 MM HG: CPT | Mod: CPTII,S$GLB,, | Performed by: PHYSICIAN ASSISTANT

## 2023-02-09 PROCEDURE — 20610 DRAIN/INJ JOINT/BURSA W/O US: CPT | Mod: LT,S$GLB,, | Performed by: PHYSICIAN ASSISTANT

## 2023-02-09 PROCEDURE — 3008F BODY MASS INDEX DOCD: CPT | Mod: CPTII,S$GLB,, | Performed by: PHYSICIAN ASSISTANT

## 2023-02-09 PROCEDURE — 1159F MED LIST DOCD IN RCRD: CPT | Mod: CPTII,S$GLB,, | Performed by: PHYSICIAN ASSISTANT

## 2023-02-09 PROCEDURE — 3080F PR MOST RECENT DIASTOLIC BLOOD PRESSURE >= 90 MM HG: ICD-10-PCS | Mod: CPTII,S$GLB,, | Performed by: PHYSICIAN ASSISTANT

## 2023-02-09 PROCEDURE — 99214 PR OFFICE/OUTPT VISIT, EST, LEVL IV, 30-39 MIN: ICD-10-PCS | Mod: 25,S$GLB,, | Performed by: PHYSICIAN ASSISTANT

## 2023-02-09 PROCEDURE — 1159F PR MEDICATION LIST DOCUMENTED IN MEDICAL RECORD: ICD-10-PCS | Mod: CPTII,S$GLB,, | Performed by: PHYSICIAN ASSISTANT

## 2023-02-09 PROCEDURE — 99999 PR PBB SHADOW E&M-EST. PATIENT-LVL IV: CPT | Mod: PBBFAC,,, | Performed by: PHYSICIAN ASSISTANT

## 2023-02-09 PROCEDURE — 20610 LARGE JOINT ASPIRATION/INJECTION: L KNEE: ICD-10-PCS | Mod: LT,S$GLB,, | Performed by: PHYSICIAN ASSISTANT

## 2023-02-09 PROCEDURE — 1160F PR REVIEW ALL MEDS BY PRESCRIBER/CLIN PHARMACIST DOCUMENTED: ICD-10-PCS | Mod: CPTII,S$GLB,, | Performed by: PHYSICIAN ASSISTANT

## 2023-02-09 PROCEDURE — 3008F PR BODY MASS INDEX (BMI) DOCUMENTED: ICD-10-PCS | Mod: CPTII,S$GLB,, | Performed by: PHYSICIAN ASSISTANT

## 2023-02-09 PROCEDURE — 1160F RVW MEDS BY RX/DR IN RCRD: CPT | Mod: CPTII,S$GLB,, | Performed by: PHYSICIAN ASSISTANT

## 2023-02-09 PROCEDURE — 73564 XR KNEE ORTHO BILAT WITH FLEXION: ICD-10-PCS | Mod: 26,,, | Performed by: RADIOLOGY

## 2023-02-09 PROCEDURE — 73564 X-RAY EXAM KNEE 4 OR MORE: CPT | Mod: 26,,, | Performed by: RADIOLOGY

## 2023-02-09 RX ORDER — TRIAMCINOLONE ACETONIDE 40 MG/ML
40 INJECTION, SUSPENSION INTRA-ARTICULAR; INTRAMUSCULAR
Status: DISCONTINUED | OUTPATIENT
Start: 2023-02-09 | End: 2023-02-09 | Stop reason: HOSPADM

## 2023-02-09 RX ADMIN — TRIAMCINOLONE ACETONIDE 40 MG: 40 INJECTION, SUSPENSION INTRA-ARTICULAR; INTRAMUSCULAR at 10:02

## 2023-02-09 NOTE — PROGRESS NOTES
CC: Left knee pain    Patient is a 53-year-old male who presents today for follow-up evaluation of left knee pain.  He has a history of left knee arthroscopy with Dr. Kruse in 2014 as well as prior ACL/PCL reconstruction many years ago.  Patient reports that he began experiencing acute onset left knee pain with associated swelling 3-4 days ago.  He is not recall any recent fall, injury, or trauma to the left knee.  Pain is described as a dull ache that is worse with bending the knee, weight-bearing, and going up and down stairs.  He denies any associated numbness or tingling of the left lower extremity.  There is no associated subjective instability or mechanical grinding/catching.  He has tried corticosteroid injections and viscosupplementation injections in the past.  He has been taking over-the-counter anti-inflammatory medication which does provide mild relief.    Mechanical symptoms: catching  Subjective instability: (--)   Worse with knee flexion and squats  Better with rest.   Nocturnal symptoms: (+)    No previous surgeries or trauma on right knee      DATE OF PROCEDURE: 12/31/2014     PREOPERATIVE DIAGNOSES:   1. Left knee medial meniscus tear.   2. Left knee lateral meniscus tear     POSTOPERATIVE DIAGNOSES:   1. Left knee medial meniscus tear.   2. Left knee lateral meniscus tear  3. Left knee loose body     PROCEDURES:   1. Left knee arthroscopic partial medial and lateral meniscectomies  2. Left knee arthroscopic loose body removal     SURGEON: Sergey Kruse M.D.     REVIEW OF SYSTEMS:   Constitution: Negative. Negative for chills, fever and night sweats.   HENT: Negative for congestion and headaches.    Eyes: Negative for blurred vision, left vision loss and right vision loss.   Cardiovascular: Negative for chest pain and syncope.   Respiratory: Negative for cough and shortness of breath.    Endocrine: Negative for polydipsia, polyphagia and polyuria.   Hematologic/Lymphatic: Negative for  bleeding problem. Does not bruise/bleed easily.   Skin: Negative for dry skin, itching and rash.   Musculoskeletal: Negative for falls. Positive for right knee pain and  muscle weakness.   Gastrointestinal: Negative for abdominal pain and bowel incontinence.   Genitourinary: Negative for bladder incontinence and nocturia.   Neurological: Negative for disturbances in coordination, loss of balance and seizures.   Psychiatric/Behavioral: Negative for depression. The patient does not have insomnia.    Allergic/Immunologic: Negative for hives and persistent infections.     PAST MEDICAL HISTORY:   Past Medical History:   Diagnosis Date    Allergy     Back pain     GERD (gastroesophageal reflux disease)        PAST SURGICAL HISTORY:   Past Surgical History:   Procedure Laterality Date    ANKLE SURGERY Left     ARTHROSCOPIC REPAIR OF ROTATOR CUFF OF SHOULDER Left 12/16/2022    Procedure: REPAIR, ROTATOR CUFF, ARTHROSCOPIC;  Surgeon: Montana Russ Jr., MD;  Location: [facility] OR;  Service: Orthopedics;  Laterality: Left;  need opus system wilbur notified cc    ARTHROSCOPY OF SHOULDER WITH DECOMPRESSION OF SUBACROMIAL SPACE Left 12/16/2022    Procedure: ARTHROSCOPY, SHOULDER, WITH SUBACROMIAL SPACE DECOMPRESSION;  Surgeon: Montana Russ Jr., MD;  Location: [facility] OR;  Service: Orthopedics;  Laterality: Left;    BACK SURGERY  01/30/2017    INJECTION, TENDON SHEATH OR LIGAMENT, 1 TENDON SHEATH OR LIGAMENT Right 12/16/2022    Procedure: INJECTION,TENDON SHEATH OR LIGAMENT,1 TENDON SHEATH OR LIGAMENT;  Surgeon: Montana Russ Jr., MD;  Location: [facility] OR;  Service: Orthopedics;  Laterality: Right;    KNEE SURGERY      x 5    VASECTOMY         FAMILY HISTORY:   Family History   Problem Relation Age of Onset    Prostate cancer Father     Kidney cancer Neg Hx        SOCIAL HISTORY:   Social History     Socioeconomic History    Marital status:    Tobacco Use    Smoking status: Never    Smokeless tobacco: Never   Substance  and Sexual Activity    Alcohol use: Yes     Comment: occasional    Drug use: No    Sexual activity: Yes       MEDICATIONS:     Current Outpatient Medications:     gabapentin (NEURONTIN) 100 MG capsule, Take 100 mg by mouth 3 (three) times daily., Disp: , Rfl:     pep injection, Inject .25 ml as directed   For compounding pharmacy use:  Add PAPAVERINE 30 mcg Add PHENTOLAMINE 10 mg Add ALPROSTADIL 100 mcg, Disp: 2 vial, Rfl: 11    diazePAM (VALIUM) 5 MG tablet, Take 1 tablet (5 mg total) by mouth every 6 (six) hours as needed for Anxiety., Disp: 4 tablet, Rfl: 0    erythromycin (ROMYCIN) ophthalmic ointment, Place a 1/2 inch ribbon of ointment into the lower eyelid of each eye 4 times daily. (Patient not taking: Reported on 1/30/2023), Disp: 3.5 g, Rfl: 0    oxyCODONE-acetaminophen (PERCOCET) 7.5-325 mg per tablet, Take 1 tablet by mouth every 4 (four) hours as needed for Pain. (Patient not taking: Reported on 1/30/2023), Disp: 40 tablet, Rfl: 0    promethazine (PHENERGAN) 25 MG tablet, Take 1 tablet (25 mg total) by mouth every 6 (six) hours as needed for Nausea. (Patient not taking: Reported on 1/30/2023), Disp: 15 tablet, Rfl: 0    tamsulosin (FLOMAX) 0.4 mg Cap, Take 1 capsule (0.4 mg total) by mouth once daily., Disp: 30 capsule, Rfl: 11    testosterone cypionate (DEPOTESTOTERONE CYPIONATE) 200 mg/mL injection, Inject 1 mL (200 mg total) into the muscle every 14 (fourteen) days., Disp: 10 mL, Rfl: 1    Current Facility-Administered Medications:     triamcinolone acetonide injection 40 mg, 40 mg, Intra-articular, , Monico Ramos PA-C, 40 mg at 02/09/23 1030    Facility-Administered Medications Ordered in Other Visits:     triamcinolone acetonide injection 40 mg, 40 mg, Intra-articular, , Sergey Kruse MD, 40 mg at 11/11/14 1327    ALLERGIES:   Review of patient's allergies indicates:   Allergen Reactions    Sulfa (sulfonamide antibiotics) Swelling    Neosporin (neomycin-polymyx) Rash     Topical  irritation     Aspirin Swelling    Latex, natural rubber Hives and Itching    Shellfish containing products Swelling       VITAL SIGNS:   BP (!) 141/94   Pulse 88   Ht 6' (1.829 m)   Wt 126.1 kg (278 lb)   BMI 37.70 kg/m²      PHYSICAL EXAMINATION:  General:  The patient is alert and oriented x 3.  Mood is pleasant.  Observation of ears, eyes and nose reveal no gross abnormalities.  No labored breathing observed.    LEFT KNEE EXAMINATION     OBSERVATION / INSPECTION   Gait:   antalgic   Alignment:  Neutral   Scars:   None   Muscle atrophy: Mild  Effusion:  moderate   Warmth:  None   Discoloration:   none     TENDERNESS / CREPITUS (T / C):          T / C      T / C   Patella   - / -   Lateral joint line   - / -   Peripatellar medial  -  Medial joint line    + / -   Peripatellar lateral +  Medial plica   - / -   Patellar tendon -   Popliteal fossa   - / -   Quad tendon   -   Gastrocnemius   -   Prepatellar Bursa - / -   Quadricep   -   Tibial tubercle  -  Thigh/hamstring  -   Pes anserine/HS -  Fibula    -   ITB   - / -  Tibia     -   Tib/fib joint  - / -  LCL    -     MFC   - / -   MCL: Proximal  -    LFC   - / -    Distal   -          ROM: (* = pain)  PASSIVE   ACTIVE    Left :   *5 / *5 / *100   *5 / *5 / *100     Right :    5 / 0 / 130   5 / 0 / 125    Patellofemoral examination:  See above noted areas of tenderness.   Patella position    Subluxation / dislocation: Centered           Sup. / Inf;   Normal   Crepitus (PF):    Absent   Patellar Mobility:       Medial-lateral:   Normal    Superior-inferior:  Normal    Inferior tilt   Normal    Patellar tendon:  Normal   Lateral tilt:    Normal   J-sign:     None   Patellofemoral grind:   + pain       MENISCAL SIGNS:     Pain on terminal extension:  +  Pain on terminal flexion:  +  Jamess maneuver:  + (for medial)  Squat     + (for medial and lateral)    LIGAMENT EXAMINATION:  ACL / Lachman:  normal (-1 to 2mm)    PCL-Post.  drawer: normal 0 to 2mm  MCL-  Valgus:  normal 0 to 2mm  LCL- Varus:  normal 0 to 2mm  Pivot shift: normal (Equal)   Dial Test: difference c/w other side   At 30° flexion: normal (< 5°)    At 90° flexion: normal (< 5°)   Reverse Pivot Shift:   normal (Equal)     STRENGTH: (* = with pain) PAINFUL SIDE   Quadricep   *4/5   Hamstrin/5    EXTREMITY NEURO-VASCULAR EXAMINATION:   Sensation:  Grossly intact to light touch all dermatomal regions.   Motor Function:  Fully intact motor function at hip, knee, foot and ankle    DTRs;  quadriceps and  achilles 2+.  No clonus and downgoing Babinski.    Vascular status:  DP and PT pulses 2+, brisk capillary refill, symmetric.     OTHER FINDINGS:  Pain with SLR    Radiographic Findings 2021:    Right: No fracture dislocation bone destruction seen.     Left: There is ACL repair.  There is moderate DJD and a varus deformity.  No fracture dislocation bone destruction or OCD seen.    Xrays of the knees were ordered and reviewed by me today. These findings were discussed and reviewed with the patient.    X-rays left knee (2023):  There is bilateral medial compartmental narrowing, left greater than right.  Surgical changes are noted of left knee cruciate ligament repair.  There is prominent osteophytosis arising from the medial femoral condyle and adjacent medial tibial plateau on the left.  No acute displaced fracture or dislocation of the left or right knee.  No large knee joint effusion.  No radiopaque foreign body.    ASSESSMENT:      ICD-10-CM ICD-9-CM   1. Acute pain of left knee  M25.562 719.46   2. Primary osteoarthritis of left knee  M17.12 715.16   3. S/P ACL reconstruction  Z98.890 V45.89   4. Effusion of left knee joint  M25.462 719.06       PLAN:     I made the decision to obtain old records of the patient including previous notes and imaging. New imaging was ordered today of the extremity or extremities evaluated. I independently reviewed and interpreted the radiographs and/or MRIs  today as well as prior imaging, if available.    We discussed at length different treatment options including conservative vs surgical management. These include anti-inflammatories, acetaminophen, rest, ice, heat, formal physical therapy including strengthening and stretching exercises, home exercise programs, dry needling, corticosteroid versus viscosupplementation injections, and finally surgical intervention.      15 cc's of normal joint fluid aspirated followed by therapeutic CSI. See procedure note for details.     Prescription for Meloxicam 15 mg one p.o. QD prn pain provided today.  Advised patient to refrain from taking other anti-inflammatory medication while taking meloxicam, however may alternate with acetaminophen if needed.    Continue to rest, ice, elevate the left knee.    Follow-up in approximately 6 weeks.      All of the patient's questions were answered and the patient will contact us if they have any questions or concerns in the interim.    Medical Dictation software was used during the dictation of portions or the entirety of this medical record.  Phonetic or grammatic errors may exist due to the use of this software. For clarification, refer to the author of the document.

## 2023-02-09 NOTE — PROCEDURES
Large Joint Aspiration/Injection: L knee    Date/Time: 2/9/2023 10:30 AM  Performed by: Monico Ramos PA-C  Authorized by: Monico Ramos PA-C     Consent Done?:  Yes (Verbal)  Indications:  Joint swelling and pain  Site marked: the procedure site was marked    Timeout: prior to procedure the correct patient, procedure, and site was verified    Prep: patient was prepped and draped in usual sterile fashion      Local anesthesia used?: Yes    Local anesthetic:  Lidocaine 1% without epinephrine  Anesthetic total (ml):  10      Details:  Needle Size:  22 G  Ultrasonic Guidance for needle placement?: No    Approach:  Superior  Location:  Knee  Site:  L knee  Medications:  40 mg triamcinolone acetonide 40 mg/mL  Aspirate amount (mL):  15  Aspirate:  Yellow and clear  Patient tolerance:  Patient tolerated the procedure well with no immediate complications    Aspiration/Injection Procedure    A time out was performed, including verification of patient ID, procedure, site and side, availability of information and equipment, review of safety issues, and agreement with consent, the procedure site was marked.    Aspiration:  After time out was performed, the patient was prepped aseptically with povidone-iodine swabsticks. 15cc's of normal joint fluid was aspirated from the Superolateral  aspect of the left Knee Joint using a 22g x 1.5 needle in sterile fashion without complication.     Injection:  Following aspiration, a diagnostic and therapeutic injection of 1:3cc Kenalog/Marcaine was given under sterile technique in the supine position.     Esa Yang had no adverse reactions to the medication. Pain decreased. He was instructed to apply ice to the joint for 20 minutes and avoid strenuous activities for 24-36 hours following the injection. He was warned of possible blood sugar and/or blood pressure changes during that time. Following that time, he can resume regular activities.

## 2023-02-13 ENCOUNTER — ANESTHESIA (OUTPATIENT)
Dept: SURGERY | Facility: HOSPITAL | Age: 53
End: 2023-02-13
Payer: COMMERCIAL

## 2023-02-13 ENCOUNTER — ANESTHESIA EVENT (OUTPATIENT)
Dept: SURGERY | Facility: HOSPITAL | Age: 53
End: 2023-02-13
Payer: COMMERCIAL

## 2023-02-13 ENCOUNTER — HOSPITAL ENCOUNTER (OUTPATIENT)
Facility: HOSPITAL | Age: 53
LOS: 1 days | Discharge: HOME | End: 2023-02-13
Attending: EMERGENCY MEDICINE | Admitting: STUDENT IN AN ORGANIZED HEALTH CARE EDUCATION/TRAINING PROGRAM
Payer: COMMERCIAL

## 2023-02-13 VITALS
OXYGEN SATURATION: 94 % | HEIGHT: 72 IN | HEART RATE: 95 BPM | DIASTOLIC BLOOD PRESSURE: 95 MMHG | WEIGHT: 270 LBS | BODY MASS INDEX: 36.57 KG/M2 | TEMPERATURE: 98 F | RESPIRATION RATE: 19 BRPM | SYSTOLIC BLOOD PRESSURE: 147 MMHG

## 2023-02-13 DIAGNOSIS — K37 APPENDICITIS: Primary | ICD-10-CM

## 2023-02-13 DIAGNOSIS — R11.2 NAUSEA AND VOMITING, UNSPECIFIED VOMITING TYPE: ICD-10-CM

## 2023-02-13 DIAGNOSIS — M75.112 NONTRAUMATIC INCOMPLETE TEAR OF LEFT ROTATOR CUFF: ICD-10-CM

## 2023-02-13 DIAGNOSIS — K42.9 UMBILICAL HERNIA WITHOUT OBSTRUCTION AND WITHOUT GANGRENE: ICD-10-CM

## 2023-02-13 LAB
ALBUMIN SERPL BCP-MCNC: 4.7 G/DL (ref 3.5–5.2)
ALP SERPL-CCNC: 109 U/L (ref 38–126)
ALT SERPL W/O P-5'-P-CCNC: 17 U/L (ref 10–44)
AMPHET+METHAMPHET UR QL: NEGATIVE
ANION GAP SERPL CALC-SCNC: 9 MMOL/L (ref 8–16)
AST SERPL-CCNC: 18 U/L (ref 15–46)
BARBITURATES UR QL SCN>200 NG/ML: NEGATIVE
BASOPHILS # BLD AUTO: 0.03 K/UL (ref 0–0.2)
BASOPHILS NFR BLD: 0.2 % (ref 0–1.9)
BENZODIAZ UR QL SCN>200 NG/ML: NEGATIVE
BILIRUB SERPL-MCNC: 0.7 MG/DL (ref 0.1–1)
BILIRUB UR QL STRIP: NEGATIVE
BZE UR QL SCN: NEGATIVE
CALCIUM SERPL-MCNC: 9 MG/DL (ref 8.7–10.5)
CANNABINOIDS UR QL SCN: NEGATIVE
CHLORIDE SERPL-SCNC: 102 MMOL/L (ref 95–110)
CLARITY UR REFRACT.AUTO: CLEAR
CO2 SERPL-SCNC: 26 MMOL/L (ref 23–29)
COLOR UR AUTO: YELLOW
CREAT SERPL-MCNC: 1.21 MG/DL (ref 0.5–1.4)
CREAT UR-MCNC: 190.5 MG/DL (ref 23–375)
DIFFERENTIAL METHOD: ABNORMAL
EOSINOPHIL # BLD AUTO: 0 K/UL (ref 0–0.5)
EOSINOPHIL NFR BLD: 0.2 % (ref 0–8)
ERYTHROCYTE [DISTWIDTH] IN BLOOD BY AUTOMATED COUNT: 13.9 % (ref 11.5–14.5)
EST. GFR  (NO RACE VARIABLE): >60 ML/MIN/1.73 M^2
GLUCOSE SERPL-MCNC: 140 MG/DL (ref 70–110)
GLUCOSE UR QL STRIP: NEGATIVE
HCT VFR BLD AUTO: 46.7 % (ref 40–54)
HGB BLD-MCNC: 15.2 G/DL (ref 14–18)
HGB UR QL STRIP: NEGATIVE
IMM GRANULOCYTES # BLD AUTO: 0.07 K/UL (ref 0–0.04)
IMM GRANULOCYTES NFR BLD AUTO: 0.5 % (ref 0–0.5)
KETONES UR QL STRIP: NEGATIVE
LEUKOCYTE ESTERASE UR QL STRIP: NEGATIVE
LIPASE SERPL-CCNC: 46 U/L (ref 23–300)
LYMPHOCYTES # BLD AUTO: 3.7 K/UL (ref 1–4.8)
LYMPHOCYTES NFR BLD: 24.8 % (ref 18–48)
MCH RBC QN AUTO: 25.9 PG (ref 27–31)
MCHC RBC AUTO-ENTMCNC: 32.5 G/DL (ref 32–36)
MCV RBC AUTO: 79 FL (ref 82–98)
METHADONE UR QL SCN>300 NG/ML: NEGATIVE
MONOCYTES # BLD AUTO: 1.2 K/UL (ref 0.3–1)
MONOCYTES NFR BLD: 8 % (ref 4–15)
NEUTROPHILS # BLD AUTO: 9.8 K/UL (ref 1.8–7.7)
NEUTROPHILS NFR BLD: 66.3 % (ref 38–73)
NITRITE UR QL STRIP: NEGATIVE
NRBC BLD-RTO: 0 /100 WBC
OPIATES UR QL SCN: NEGATIVE
PCP UR QL SCN>25 NG/ML: NEGATIVE
PH UR STRIP: 6 [PH] (ref 5–8)
PLATELET # BLD AUTO: 255 K/UL (ref 150–450)
PMV BLD AUTO: 10.9 FL (ref 9.2–12.9)
POTASSIUM SERPL-SCNC: 3.5 MMOL/L (ref 3.5–5.1)
PROT SERPL-MCNC: 8.2 G/DL (ref 6–8.4)
PROT UR QL STRIP: NEGATIVE
RBC # BLD AUTO: 5.88 M/UL (ref 4.6–6.2)
SARS-COV-2 RDRP RESP QL NAA+PROBE: NEGATIVE
SODIUM SERPL-SCNC: 137 MMOL/L (ref 136–145)
SP GR UR STRIP: >=1.03 (ref 1–1.03)
TOXICOLOGY INFORMATION: NORMAL
URN SPEC COLLECT METH UR: ABNORMAL
UROBILINOGEN UR STRIP-ACNC: NEGATIVE EU/DL
UUN UR-MCNC: 20 MG/DL (ref 2–20)
WBC # BLD AUTO: 14.82 K/UL (ref 3.9–12.7)

## 2023-02-13 PROCEDURE — 71000033 HC RECOVERY, INTIAL HOUR: Performed by: SURGERY

## 2023-02-13 PROCEDURE — 88302 TISSUE EXAM BY PATHOLOGIST: CPT | Performed by: PATHOLOGY

## 2023-02-13 PROCEDURE — 99285 EMERGENCY DEPT VISIT HI MDM: CPT | Mod: 25

## 2023-02-13 PROCEDURE — 88302 PR  SURG PATH,LEVEL II: ICD-10-PCS | Mod: 26,,, | Performed by: PATHOLOGY

## 2023-02-13 PROCEDURE — 63600175 PHARM REV CODE 636 W HCPCS: Performed by: STUDENT IN AN ORGANIZED HEALTH CARE EDUCATION/TRAINING PROGRAM

## 2023-02-13 PROCEDURE — D9220A PRA ANESTHESIA: Mod: ANES,,, | Performed by: ANESTHESIOLOGY

## 2023-02-13 PROCEDURE — 25500020 PHARM REV CODE 255: Mod: ER | Performed by: PHYSICIAN ASSISTANT

## 2023-02-13 PROCEDURE — D9220A PRA ANESTHESIA: Mod: CRNA,,, | Performed by: STUDENT IN AN ORGANIZED HEALTH CARE EDUCATION/TRAINING PROGRAM

## 2023-02-13 PROCEDURE — 25000003 PHARM REV CODE 250: Performed by: SURGERY

## 2023-02-13 PROCEDURE — 37000008 HC ANESTHESIA 1ST 15 MINUTES: Performed by: SURGERY

## 2023-02-13 PROCEDURE — 25000003 PHARM REV CODE 250: Performed by: STUDENT IN AN ORGANIZED HEALTH CARE EDUCATION/TRAINING PROGRAM

## 2023-02-13 PROCEDURE — 96367 TX/PROPH/DG ADDL SEQ IV INF: CPT | Mod: 59

## 2023-02-13 PROCEDURE — 36000708 HC OR TIME LEV III 1ST 15 MIN: Performed by: SURGERY

## 2023-02-13 PROCEDURE — 83690 ASSAY OF LIPASE: CPT | Mod: ER | Performed by: PHYSICIAN ASSISTANT

## 2023-02-13 PROCEDURE — 88304 PR  SURG PATH,LEVEL III: ICD-10-PCS | Mod: 26,,, | Performed by: PATHOLOGY

## 2023-02-13 PROCEDURE — 80307 DRUG TEST PRSMV CHEM ANLYZR: CPT | Mod: ER | Performed by: PHYSICIAN ASSISTANT

## 2023-02-13 PROCEDURE — 81003 URINALYSIS AUTO W/O SCOPE: CPT | Mod: ER,59 | Performed by: PHYSICIAN ASSISTANT

## 2023-02-13 PROCEDURE — 80053 COMPREHEN METABOLIC PANEL: CPT | Mod: ER | Performed by: PHYSICIAN ASSISTANT

## 2023-02-13 PROCEDURE — 96365 THER/PROPH/DIAG IV INF INIT: CPT

## 2023-02-13 PROCEDURE — D9220A PRA ANESTHESIA: ICD-10-PCS | Mod: ANES,,, | Performed by: ANESTHESIOLOGY

## 2023-02-13 PROCEDURE — D9220A PRA ANESTHESIA: ICD-10-PCS | Mod: CRNA,,, | Performed by: STUDENT IN AN ORGANIZED HEALTH CARE EDUCATION/TRAINING PROGRAM

## 2023-02-13 PROCEDURE — 96375 TX/PRO/DX INJ NEW DRUG ADDON: CPT

## 2023-02-13 PROCEDURE — 63600175 PHARM REV CODE 636 W HCPCS: Mod: ER | Performed by: PHYSICIAN ASSISTANT

## 2023-02-13 PROCEDURE — 88302 TISSUE EXAM BY PATHOLOGIST: CPT | Mod: 26,,, | Performed by: PATHOLOGY

## 2023-02-13 PROCEDURE — 37000009 HC ANESTHESIA EA ADD 15 MINS: Performed by: SURGERY

## 2023-02-13 PROCEDURE — 96361 HYDRATE IV INFUSION ADD-ON: CPT | Mod: 59

## 2023-02-13 PROCEDURE — 63600175 PHARM REV CODE 636 W HCPCS: Performed by: EMERGENCY MEDICINE

## 2023-02-13 PROCEDURE — 88304 TISSUE EXAM BY PATHOLOGIST: CPT | Performed by: PATHOLOGY

## 2023-02-13 PROCEDURE — 25000003 PHARM REV CODE 250: Mod: ER | Performed by: PHYSICIAN ASSISTANT

## 2023-02-13 PROCEDURE — 88304 TISSUE EXAM BY PATHOLOGIST: CPT | Mod: 26,,, | Performed by: PATHOLOGY

## 2023-02-13 PROCEDURE — 36000709 HC OR TIME LEV III EA ADD 15 MIN: Performed by: SURGERY

## 2023-02-13 PROCEDURE — U0002 COVID-19 LAB TEST NON-CDC: HCPCS | Mod: ER | Performed by: PHYSICIAN ASSISTANT

## 2023-02-13 PROCEDURE — 96376 TX/PRO/DX INJ SAME DRUG ADON: CPT

## 2023-02-13 PROCEDURE — 27201423 OPTIME MED/SURG SUP & DEVICES STERILE SUPPLY: Performed by: SURGERY

## 2023-02-13 PROCEDURE — 85025 COMPLETE CBC W/AUTO DIFF WBC: CPT | Mod: ER | Performed by: PHYSICIAN ASSISTANT

## 2023-02-13 RX ORDER — MUPIROCIN 20 MG/G
OINTMENT TOPICAL
Status: DISCONTINUED | OUTPATIENT
Start: 2023-02-13 | End: 2023-02-13 | Stop reason: HOSPADM

## 2023-02-13 RX ORDER — HYDROCODONE BITARTRATE AND ACETAMINOPHEN 5; 325 MG/1; MG/1
1 TABLET ORAL EVERY 4 HOURS PRN
Status: DISCONTINUED | OUTPATIENT
Start: 2023-02-13 | End: 2023-02-13 | Stop reason: HOSPADM

## 2023-02-13 RX ORDER — ROCURONIUM BROMIDE 10 MG/ML
INJECTION, SOLUTION INTRAVENOUS
Status: DISCONTINUED | OUTPATIENT
Start: 2023-02-13 | End: 2023-02-13

## 2023-02-13 RX ORDER — TALC
6 POWDER (GRAM) TOPICAL NIGHTLY PRN
Status: DISCONTINUED | OUTPATIENT
Start: 2023-02-13 | End: 2023-02-13 | Stop reason: HOSPADM

## 2023-02-13 RX ORDER — KETOROLAC TROMETHAMINE 10 MG/1
20 TABLET, FILM COATED ORAL ONCE
Status: DISCONTINUED | OUTPATIENT
Start: 2023-02-13 | End: 2023-02-13 | Stop reason: HOSPADM

## 2023-02-13 RX ORDER — SODIUM CHLORIDE 0.9 % (FLUSH) 0.9 %
10 SYRINGE (ML) INJECTION
Status: DISCONTINUED | OUTPATIENT
Start: 2023-02-13 | End: 2023-02-13 | Stop reason: HOSPADM

## 2023-02-13 RX ORDER — KETOROLAC TROMETHAMINE 30 MG/ML
INJECTION, SOLUTION INTRAMUSCULAR; INTRAVENOUS
Status: DISCONTINUED | OUTPATIENT
Start: 2023-02-13 | End: 2023-02-13

## 2023-02-13 RX ORDER — MIDAZOLAM HYDROCHLORIDE 1 MG/ML
INJECTION, SOLUTION INTRAMUSCULAR; INTRAVENOUS
Status: DISCONTINUED | OUTPATIENT
Start: 2023-02-13 | End: 2023-02-13

## 2023-02-13 RX ORDER — SODIUM CHLORIDE 9 MG/ML
INJECTION, SOLUTION INTRAVENOUS CONTINUOUS
Status: DISCONTINUED | OUTPATIENT
Start: 2023-02-13 | End: 2023-02-13 | Stop reason: HOSPADM

## 2023-02-13 RX ORDER — LIDOCAINE HYDROCHLORIDE 20 MG/ML
INJECTION INTRAVENOUS
Status: DISCONTINUED | OUTPATIENT
Start: 2023-02-13 | End: 2023-02-13

## 2023-02-13 RX ORDER — KETOROLAC TROMETHAMINE 10 MG/1
30 TABLET, FILM COATED ORAL ONCE
Status: DISCONTINUED | OUTPATIENT
Start: 2023-02-13 | End: 2023-02-13

## 2023-02-13 RX ORDER — PHENYLEPHRINE HYDROCHLORIDE 10 MG/ML
INJECTION INTRAVENOUS
Status: DISCONTINUED | OUTPATIENT
Start: 2023-02-13 | End: 2023-02-13

## 2023-02-13 RX ORDER — SUCCINYLCHOLINE CHLORIDE 20 MG/ML
INJECTION INTRAMUSCULAR; INTRAVENOUS
Status: DISCONTINUED | OUTPATIENT
Start: 2023-02-13 | End: 2023-02-13

## 2023-02-13 RX ORDER — PROMETHAZINE HYDROCHLORIDE 25 MG/ML
INJECTION, SOLUTION INTRAMUSCULAR; INTRAVENOUS
Status: DISCONTINUED
Start: 2023-02-13 | End: 2023-02-13 | Stop reason: HOSPADM

## 2023-02-13 RX ORDER — NEOSTIGMINE METHYLSULFATE 1 MG/ML
INJECTION, SOLUTION INTRAVENOUS
Status: DISCONTINUED | OUTPATIENT
Start: 2023-02-13 | End: 2023-02-13

## 2023-02-13 RX ORDER — HYDROMORPHONE HYDROCHLORIDE 1 MG/ML
1 INJECTION, SOLUTION INTRAMUSCULAR; INTRAVENOUS; SUBCUTANEOUS
Status: COMPLETED | OUTPATIENT
Start: 2023-02-13 | End: 2023-02-13

## 2023-02-13 RX ORDER — EPHEDRINE SULFATE 50 MG/ML
INJECTION, SOLUTION INTRAVENOUS
Status: DISCONTINUED | OUTPATIENT
Start: 2023-02-13 | End: 2023-02-13

## 2023-02-13 RX ORDER — PIPERACILLIN SODIUM, TAZOBACTAM SODIUM 4; .5 G/20ML; G/20ML
INJECTION, POWDER, LYOPHILIZED, FOR SOLUTION INTRAVENOUS
Status: DISCONTINUED
Start: 2023-02-13 | End: 2023-02-13 | Stop reason: HOSPADM

## 2023-02-13 RX ORDER — DIPHENHYDRAMINE HCL 25 MG
25 CAPSULE ORAL EVERY 6 HOURS PRN
Status: DISCONTINUED | OUTPATIENT
Start: 2023-02-13 | End: 2023-02-13 | Stop reason: HOSPADM

## 2023-02-13 RX ORDER — FENTANYL CITRATE 50 UG/ML
INJECTION, SOLUTION INTRAMUSCULAR; INTRAVENOUS
Status: DISCONTINUED | OUTPATIENT
Start: 2023-02-13 | End: 2023-02-13

## 2023-02-13 RX ORDER — ACETAMINOPHEN 10 MG/ML
INJECTION, SOLUTION INTRAVENOUS
Status: DISCONTINUED | OUTPATIENT
Start: 2023-02-13 | End: 2023-02-13

## 2023-02-13 RX ORDER — HYDROCODONE BITARTRATE AND ACETAMINOPHEN 10; 325 MG/1; MG/1
1 TABLET ORAL EVERY 4 HOURS PRN
Status: DISCONTINUED | OUTPATIENT
Start: 2023-02-13 | End: 2023-02-13 | Stop reason: HOSPADM

## 2023-02-13 RX ORDER — PROPOFOL 10 MG/ML
VIAL (ML) INTRAVENOUS
Status: DISCONTINUED | OUTPATIENT
Start: 2023-02-13 | End: 2023-02-13

## 2023-02-13 RX ORDER — ONDANSETRON 2 MG/ML
INJECTION INTRAMUSCULAR; INTRAVENOUS
Status: DISCONTINUED | OUTPATIENT
Start: 2023-02-13 | End: 2023-02-13

## 2023-02-13 RX ORDER — ONDANSETRON 2 MG/ML
4 INJECTION INTRAMUSCULAR; INTRAVENOUS
Status: COMPLETED | OUTPATIENT
Start: 2023-02-13 | End: 2023-02-13

## 2023-02-13 RX ORDER — HYDROMORPHONE HYDROCHLORIDE 1 MG/ML
INJECTION, SOLUTION INTRAMUSCULAR; INTRAVENOUS; SUBCUTANEOUS
Status: DISCONTINUED
Start: 2023-02-13 | End: 2023-02-13 | Stop reason: HOSPADM

## 2023-02-13 RX ORDER — ACETAMINOPHEN 325 MG/1
650 TABLET ORAL EVERY 4 HOURS PRN
Status: DISCONTINUED | OUTPATIENT
Start: 2023-02-13 | End: 2023-02-13 | Stop reason: HOSPADM

## 2023-02-13 RX ORDER — HYDROMORPHONE HYDROCHLORIDE 2 MG/ML
0.2 INJECTION, SOLUTION INTRAMUSCULAR; INTRAVENOUS; SUBCUTANEOUS EVERY 5 MIN PRN
Status: DISCONTINUED | OUTPATIENT
Start: 2023-02-13 | End: 2023-02-13 | Stop reason: HOSPADM

## 2023-02-13 RX ORDER — KETOROLAC TROMETHAMINE 30 MG/ML
INJECTION, SOLUTION INTRAMUSCULAR; INTRAVENOUS
Status: DISPENSED
Start: 2023-02-13 | End: 2023-02-13

## 2023-02-13 RX ORDER — DEXAMETHASONE SODIUM PHOSPHATE 4 MG/ML
INJECTION, SOLUTION INTRA-ARTICULAR; INTRALESIONAL; INTRAMUSCULAR; INTRAVENOUS; SOFT TISSUE
Status: DISCONTINUED | OUTPATIENT
Start: 2023-02-13 | End: 2023-02-13

## 2023-02-13 RX ORDER — DIPHENHYDRAMINE HYDROCHLORIDE 50 MG/ML
12.5 INJECTION INTRAMUSCULAR; INTRAVENOUS
Status: DISCONTINUED | OUTPATIENT
Start: 2023-02-13 | End: 2023-02-13 | Stop reason: HOSPADM

## 2023-02-13 RX ORDER — KETOROLAC TROMETHAMINE 30 MG/ML
15 INJECTION, SOLUTION INTRAMUSCULAR; INTRAVENOUS
Status: COMPLETED | OUTPATIENT
Start: 2023-02-13 | End: 2023-02-13

## 2023-02-13 RX ADMIN — HYDROCODONE BITARTRATE AND ACETAMINOPHEN 1 TABLET: 10; 325 TABLET ORAL at 04:02

## 2023-02-13 RX ADMIN — LIDOCAINE HYDROCHLORIDE 100 MG: 20 INJECTION, SOLUTION INTRAVENOUS at 03:02

## 2023-02-13 RX ADMIN — HYDROMORPHONE HYDROCHLORIDE 1 MG: 1 INJECTION, SOLUTION INTRAMUSCULAR; INTRAVENOUS; SUBCUTANEOUS at 02:02

## 2023-02-13 RX ADMIN — KETOROLAC TROMETHAMINE 30 MG: 30 INJECTION, SOLUTION INTRAMUSCULAR; INTRAVENOUS at 04:02

## 2023-02-13 RX ADMIN — SUCCINYLCHOLINE CHLORIDE 140 MG: 20 INJECTION, SOLUTION INTRAMUSCULAR; INTRAVENOUS at 03:02

## 2023-02-13 RX ADMIN — SODIUM CHLORIDE, SODIUM LACTATE, POTASSIUM CHLORIDE, AND CALCIUM CHLORIDE: .6; .31; .03; .02 INJECTION, SOLUTION INTRAVENOUS at 02:02

## 2023-02-13 RX ADMIN — DEXAMETHASONE SODIUM PHOSPHATE 8 MG: 4 INJECTION, SOLUTION INTRA-ARTICULAR; INTRALESIONAL; INTRAMUSCULAR; INTRAVENOUS; SOFT TISSUE at 03:02

## 2023-02-13 RX ADMIN — PIPERACILLIN AND TAZOBACTAM 4.5 G: 4; .5 INJECTION, POWDER, LYOPHILIZED, FOR SOLUTION INTRAVENOUS; PARENTERAL at 11:02

## 2023-02-13 RX ADMIN — EPHEDRINE SULFATE 10 MG: 50 INJECTION, SOLUTION INTRAMUSCULAR; INTRAVENOUS; SUBCUTANEOUS at 03:02

## 2023-02-13 RX ADMIN — EPHEDRINE SULFATE 15 MG: 50 INJECTION, SOLUTION INTRAMUSCULAR; INTRAVENOUS; SUBCUTANEOUS at 03:02

## 2023-02-13 RX ADMIN — ACETAMINOPHEN 1000 MG: 10 INJECTION, SOLUTION INTRAVENOUS at 03:02

## 2023-02-13 RX ADMIN — ONDANSETRON 8 MG: 2 INJECTION, SOLUTION INTRAMUSCULAR; INTRAVENOUS at 03:02

## 2023-02-13 RX ADMIN — KETOROLAC TROMETHAMINE 15 MG: 30 INJECTION, SOLUTION INTRAMUSCULAR; INTRAVENOUS at 08:02

## 2023-02-13 RX ADMIN — SODIUM CHLORIDE: 0.9 INJECTION, SOLUTION INTRAVENOUS at 06:02

## 2023-02-13 RX ADMIN — IOHEXOL 100 ML: 350 INJECTION, SOLUTION INTRAVENOUS at 09:02

## 2023-02-13 RX ADMIN — DIPHENHYDRAMINE HYDROCHLORIDE 25 MG: 25 CAPSULE ORAL at 05:02

## 2023-02-13 RX ADMIN — GLYCOPYRROLATE 0.4 MG: 0.2 INJECTION, SOLUTION INTRAMUSCULAR; INTRAVITREAL at 03:02

## 2023-02-13 RX ADMIN — ONDANSETRON HYDROCHLORIDE 4 MG: 2 SOLUTION INTRAMUSCULAR; INTRAVENOUS at 08:02

## 2023-02-13 RX ADMIN — PHENYLEPHRINE HYDROCHLORIDE 200 MCG: 10 INJECTION INTRAVENOUS at 03:02

## 2023-02-13 RX ADMIN — FENTANYL CITRATE 100 MCG: 50 INJECTION, SOLUTION INTRAMUSCULAR; INTRAVENOUS at 02:02

## 2023-02-13 RX ADMIN — NEOSTIGMINE METHYLSULFATE 4 MG: 1 INJECTION INTRAVENOUS at 03:02

## 2023-02-13 RX ADMIN — HYDROMORPHONE HYDROCHLORIDE 1 MG: 1 INJECTION, SOLUTION INTRAMUSCULAR; INTRAVENOUS; SUBCUTANEOUS at 10:02

## 2023-02-13 RX ADMIN — PROMETHAZINE HYDROCHLORIDE 12.5 MG: 25 INJECTION INTRAMUSCULAR; INTRAVENOUS at 10:02

## 2023-02-13 RX ADMIN — PROPOFOL 200 MG: 10 INJECTION, EMULSION INTRAVENOUS at 03:02

## 2023-02-13 RX ADMIN — SODIUM CHLORIDE 1000 ML: 0.9 INJECTION, SOLUTION INTRAVENOUS at 08:02

## 2023-02-13 RX ADMIN — ROCURONIUM BROMIDE 50 MG: 10 INJECTION, SOLUTION INTRAVENOUS at 03:02

## 2023-02-13 RX ADMIN — MIDAZOLAM 2 MG: 1 INJECTION INTRAMUSCULAR; INTRAVENOUS at 02:02

## 2023-02-13 NOTE — BRIEF OP NOTE
[facility] - [facility] (Hospital)  Operative Note      Date of Procedure: 2/13/2023     Procedure: Procedure(s) (LRB):  APPENDECTOMY, LAPAROSCOPIC (N/A)     Surgeon(s) and Role:     * Babs Cano MD - Primary    Assisting Surgeon: None    Pre-Operative Diagnosis: Appendicitis [K37]  Nausea and vomiting, unspecified vomiting type [R11.2] Umbilical Hernia  Post-Operative Diagnosis: Post-Op Diagnosis Codes:     * Appendicitis [K37]     * Nausea and vomiting, unspecified vomiting type [R11.2]  Umbilical hernia  Anesthesia: General    Operative Findings (including complications, if any): Lap Appendectomy and repair umbilical hernia done under general anesthesia , Patient tolerated well and was sent to recovery room in stable condition    Description of Technical Procedures: Operative Note       Surgery Date: 2/13/2023     Surgeon(s) and Role:     * Babs Cano MD - Primary    Pre-op Diagnosis:  Appendicitis [K37]  Nausea and vomiting, unspecified vomiting type [R11.2]    Post-op Diagnosis: Post-Op Diagnosis Codes:     * Appendicitis [K37]     * Nausea and vomiting, unspecified vomiting type [R11.2]    Procedure(s) (LRB):  APPENDECTOMY, LAPAROSCOPIC (N/A)    Anesthesia: General    Procedure in Detail/Findings:    After satisfactory general anesthesia, the patient   was positioned, abdomen was prepped and draped in normal sterile manner using   ChloraPrep.  A small supraumbilical incision was made.  Veress needle was   introduced.  Abdominal cavity was insufflated using CO2 up to a pressure of 15.    A 12 mm trocar cannula was introduced through the umbilical incision and one   5-mm probe in the hypogastric and right lower quadrant area.  After properly   positioning the patient, the patient was found to have acutely perforated acute   appendicitis.  The patient was properly positioned.  The appendix was dissected   out from the inflammatory mass in the right lower quadrant area.  The   perforation fluid was  aspirated.  The mesentery of the appendix was then treated   with LigaSure.  Base of the appendix was then treated with Endo-TRAVIS.  Specimen   was retrieved through the umbilical incision.  Abdominal cavity was thoroughly   irrigated with normal saline solution.  Hemostasis satisfactorily maintained,   Then repair of umbilical hernia was performed , hernia sac was  form the umbilical skin and suture ligated using 2 zero silk suture , repair was accomplished using interrupted zero vicryl   was adequately desufflated.  All probes were withdrawn under direct vision.    Closure of all wound was performed with 0 Vicryl for the fascia.  Skin was   closed using 4-0 Monocryl.  Sterile gauze dressing was applied.  Estimated blood   loss was 30 mL.  Specimen removed was appendix.  No intraoperative   complications.  Intraoperative finding was perforated appendix.  Instrument   count, sponge count, and needle count was correct.  The patient tolerated it   well.        Estimated Blood Loss: 30 cc         Specimens (From admission, onward)       Start     Ordered    02/13/23 1555  Specimen to Pathology, Surgery General Surgery  Once        Comments: Pre-op Diagnosis: Appendicitis [K37]Nausea and vomiting, unspecified vomiting type [R11.2]Procedure(s):APPENDECTOMY, LAPAROSCOPIC Number of specimens: 1Name of specimens: hernia sac-perm     References:    Click here for ordering Quick Tip   Question Answer Comment   Procedure Type: General Surgery    Specimen Class: Routine/Screening    Which provider would you like to cc? KAREN MEZA    Release to patient Immediate        02/13/23 1555    02/13/23 1520  Specimen to Pathology, Surgery General Surgery  Once        Comments: Pre-op Diagnosis: Appendicitis [K37]Nausea and vomiting, unspecified vomiting type [R11.2]Procedure(s):APPENDECTOMY, LAPAROSCOPIC Number of specimens: 1Name of specimens: 1. Appendix-perm     References:    Click here for ordering Quick Tip    Question Answer Comment   Procedure Type: General Surgery    Specimen Class: Routine/Screening    Which provider would you like to cc? KAREN MEZA    Release to patient Immediate        02/13/23 1521                  Implants: * No implants in log *           Disposition: PACU - hemodynamically stable.           Condition: Good    Attestation:  I was present and scrubbed for the entire procedure.                 Significant Surgical Tasks Conducted by the Assistant(s), if Applicable: na    Estimated Blood Loss (EBL): 30 cc         Implants: * No implants in log *    Specimens:   Specimen (24h ago, onward)       Start     Ordered    02/13/23 1555  Specimen to Pathology, Surgery General Surgery  Once        Comments: Pre-op Diagnosis: Appendicitis [K37]Nausea and vomiting, unspecified vomiting type [R11.2]Procedure(s):APPENDECTOMY, LAPAROSCOPIC Number of specimens: 1Name of specimens: hernia sac-perm     References:    Click here for ordering Quick Tip   Question Answer Comment   Procedure Type: General Surgery    Specimen Class: Routine/Screening    Which provider would you like to cc? KAREN MEZA    Release to patient Immediate        02/13/23 1555    02/13/23 1520  Specimen to Pathology, Surgery General Surgery  Once        Comments: Pre-op Diagnosis: Appendicitis [K37]Nausea and vomiting, unspecified vomiting type [R11.2]Procedure(s):APPENDECTOMY, LAPAROSCOPIC Number of specimens: 1Name of specimens: 1. Appendix-perm     References:    Click here for ordering Quick Tip   Question Answer Comment   Procedure Type: General Surgery    Specimen Class: Routine/Screening    Which provider would you like to cc? KAREN MEZA    Release to patient Immediate        02/13/23 1521                            Condition: Good    Disposition: PACU - hemodynamically stable.    Attestation: I performed the procedure.    Discharge Note    OUTCOME: Patient tolerated treatment/procedure well without  complication and is now ready for discharge.    DISPOSITION: Home or Self Care    FINAL DIAGNOSIS:  Appendicitis    FOLLOWUP: In clinic 7 days    DISCHARGE INSTRUCTIONS:    Discharge Procedure Orders   Diet general     Keep surgical extremity elevated     Lifting restrictions     Leave dressing on - Keep it clean, dry, and intact until clinic visit     Call MD for:  temperature >100.4     Call MD for:  persistent nausea and vomiting     Call MD for:  severe uncontrolled pain     Call MD for:  redness, tenderness, or signs of infection (pain, swelling, redness, odor or green/yellow discharge around incision site)

## 2023-02-13 NOTE — ANESTHESIA PREPROCEDURE EVALUATION
02/13/2023  Pre-operative evaluation for Procedure(s) (LRB):  APPENDECTOMY, LAPAROSCOPIC (N/A)    Esa Yang is a 53 y.o. male     Patient Active Problem List   Diagnosis    Knee pain    Palpitations    Chest pain    Medial meniscus tear    Loose body in knee    Chondromalacia, knee    S/P L knee surgery, arthroscopic medial/lateral menisectomy, loose body removal (12/31/14)    Nocturia    Benign prostatic hyperplasia with urinary frequency    Hesitancy    Slow urinary stream    Post-void dribbling    Low testosterone in male    Fatigue    Trigger middle finger of right hand    Nontraumatic incomplete tear of left rotator cuff       Review of patient's allergies indicates:   Allergen Reactions    Sulfa (sulfonamide antibiotics) Swelling    Neosporin (neomycin-polymyx) Rash     Topical irritation     Aspirin Swelling    Latex, natural rubber Hives and Itching    Shellfish containing products Swelling       Current Facility-Administered Medications on File Prior to Encounter   Medication Dose Route Frequency Provider Last Rate Last Admin    triamcinolone acetonide injection 40 mg  40 mg Intra-articular  Sergey Kruse MD   40 mg at 11/11/14 1327     Current Outpatient Medications on File Prior to Encounter   Medication Sig Dispense Refill    diazePAM (VALIUM) 5 MG tablet Take 1 tablet (5 mg total) by mouth every 6 (six) hours as needed for Anxiety. 4 tablet 0    erythromycin (ROMYCIN) ophthalmic ointment Place a 1/2 inch ribbon of ointment into the lower eyelid of each eye 4 times daily. (Patient not taking: Reported on 1/30/2023) 3.5 g 0    gabapentin (NEURONTIN) 100 MG capsule Take 100 mg by mouth 3 (three) times daily.      oxyCODONE-acetaminophen (PERCOCET) 7.5-325 mg per tablet Take 1 tablet by mouth every 4 (four) hours as needed for Pain. (Patient not taking:  Reported on 1/30/2023) 40 tablet 0    pep injection Inject .25 ml as directed     For compounding pharmacy use:   Add PAPAVERINE 30 mcg  Add PHENTOLAMINE 10 mg  Add ALPROSTADIL 100 mcg 2 vial 11    promethazine (PHENERGAN) 25 MG tablet Take 1 tablet (25 mg total) by mouth every 6 (six) hours as needed for Nausea. (Patient not taking: Reported on 1/30/2023) 15 tablet 0    tamsulosin (FLOMAX) 0.4 mg Cap Take 1 capsule (0.4 mg total) by mouth once daily. 30 capsule 11    testosterone cypionate (DEPOTESTOTERONE CYPIONATE) 200 mg/mL injection Inject 1 mL (200 mg total) into the muscle every 14 (fourteen) days. 10 mL 1       Past Surgical History:   Procedure Laterality Date    ANKLE SURGERY Left     ARTHROSCOPIC REPAIR OF ROTATOR CUFF OF SHOULDER Left 12/16/2022    Procedure: REPAIR, ROTATOR CUFF, ARTHROSCOPIC;  Surgeon: Montana Russ Jr., MD;  Location: [facility] OR;  Service: Orthopedics;  Laterality: Left;  need opus system wilbur notified cc    ARTHROSCOPY OF SHOULDER WITH DECOMPRESSION OF SUBACROMIAL SPACE Left 12/16/2022    Procedure: ARTHROSCOPY, SHOULDER, WITH SUBACROMIAL SPACE DECOMPRESSION;  Surgeon: Montana Russ Jr., MD;  Location: [facility] OR;  Service: Orthopedics;  Laterality: Left;    BACK SURGERY  01/30/2017    INJECTION, TENDON SHEATH OR LIGAMENT, 1 TENDON SHEATH OR LIGAMENT Right 12/16/2022    Procedure: INJECTION,TENDON SHEATH OR LIGAMENT,1 TENDON SHEATH OR LIGAMENT;  Surgeon: Montana Russ Jr., MD;  Location: [facility] OR;  Service: Orthopedics;  Laterality: Right;    KNEE SURGERY      x 5    VASECTOMY         Social History     Socioeconomic History    Marital status:    Tobacco Use    Smoking status: Never    Smokeless tobacco: Never   Substance and Sexual Activity    Alcohol use: Yes     Comment: occasional    Drug use: No    Sexual activity: Yes         CBC:   Recent Labs     02/13/23  0820   WBC 14.82*   RBC 5.88   HGB 15.2   HCT 46.7      MCV 79*   MCH 25.9*   MCHC  32.5       CMP:   Recent Labs     23  0820      K 3.5      CO2 26   BUN 20   CREATININE 1.21   *   CALCIUM 9.0   ALBUMIN 4.7   PROT 8.2   ALKPHOS 109   ALT 17   AST 18   BILITOT 0.7       INR  No results for input(s): PT, INR, PROTIME, APTT in the last 72 hours.        Diagnostic Studies:      EKD Echo:  No results found for this or any previous visit.        Pre-op Assessment    I have reviewed the Patient Summary Reports.     I have reviewed the Nursing Notes. I have reviewed the NPO Status.   I have reviewed the Medications.     Review of Systems  Anesthesia Hx:  No problems with previous Anesthesia  History of prior surgery of interest to airway management or planning: Denies Family Hx of Anesthesia complications.   Denies Personal Hx of Anesthesia complications.   Hematology/Oncology:         -- Denies Anemia:   Cardiovascular:   Exercise tolerance: good Denies Hypertension.  Denies CAD.       Pulmonary:   Denies COPD.  Denies Asthma.  Denies Sleep Apnea.    Renal/:   Denies Chronic Renal Disease.     Hepatic/GI:   GERD Denies Liver Disease.    Musculoskeletal:   Arthritis     Neurological:   Denies CVA. Denies Seizures.    Endocrine:   Denies Diabetes.        Physical Exam  General: Cooperative and Well nourished    Airway:  Mallampati: III / II  Mouth Opening: Normal  TM Distance: Normal  Tongue: Normal  Neck ROM: Normal ROM    Dental:  Intact    Chest/Lungs:  Clear to auscultation, Normal Respiratory Rate    Heart:  Rate: Normal  Rhythm: Regular Rhythm  Sounds: Normal        Anesthesia Plan  Type of Anesthesia, risks & benefits discussed:    Anesthesia Type: Gen ETT  Intra-op Monitoring Plan: Standard ASA Monitors  Post Op Pain Control Plan: multimodal analgesia  Induction:  IV  ASA Score: 3 Emergent  Day of Surgery Review of History & Physical: H&P Update referred to the surgeon/provider.    Ready For Surgery From Anesthesia Perspective.     .

## 2023-02-13 NOTE — PROGRESS NOTES
Future Appointments   Date Time Provider Department Center   3/13/2023 10:30 AM Dang Grewal PA-C [facility] ORTHO [facility]   3/23/2023  2:00 PM Monico Ramos PA-C [facility] SPORTS [place]

## 2023-02-13 NOTE — Clinical Note
Diagnosis: Appendicitis [936715]   Admitting Provider:: ALONDRA VALLEJO [[id number]]   Future Attending Provider: KAREN MEZA [[id number]]   Reason for IP Medical Treatment  (Clinical interventions that can only be accomplished in the IP setting? ) :: appendicitis   Estimated Length of Stay:: 2 midnights   I certify that Inpatient services for greater than or equal to 2 midnights are medically necessary:: No   Plans for Post-Acute care--if anticipated (pick the single best option):: A. No post acute care anticipated at this time

## 2023-02-13 NOTE — PLAN OF CARE
CM met with pt per [employer] -   s/p lap appendectomy   Pt for d/c this afternoon   Wife Toyin  will transport to home   Lives with wife and adult son - they are able to assist as needed.     Independent prior to admit - no dme, no HH     CM unable to locate pcp - pt will call CM with this info tomorrow.   Message sent per Teams asking Scheduling Navigator to assist with Surgery f/u apt - Dr. Cano        02/13/23 3049   Discharge Planning   Assessment Type Discharge Planning Brief Assessment   Resource/Environmental Concerns none   Support Systems Spouse/significant other  (wife Toyin  [phone])   Equipment Currently Used at Home none   Current Living Arrangements home   Patient/Family Anticipates Transition to home;home with family   Patient/Family Anticipated Services at Transition none   DME Needed Upon Discharge  none   Discharge Plan A Home;Home with family

## 2023-02-13 NOTE — ED PROVIDER NOTES
Encounter Date: 2/13/2023       History     Chief Complaint   Patient presents with    Abdominal Pain     Pt C/O generalized ABD pain with N/V.  Pt denies diarrhea.  Reports possible constipation.      Pt presents as an ED-to-ED transfer for general evaluation and management of appendicitis diagnosed on CT today.  Pt presents from [facility] ED.  Pt to go to OR with gen surgery (DEBBIE Cano).  On my evaluation, pt reports recurrence of RLQ pain. He did receive pain meds prior to txfer.  He denies any new abdominal pain, nausea or vomiting.  VSS on arrival; pt in mild distress 2/2 pain.     Review of patient's allergies indicates:   Allergen Reactions    Sulfa (sulfonamide antibiotics) Swelling    Neosporin (neomycin-polymyx) Rash     Topical irritation     Aspirin Swelling    Latex, natural rubber Hives and Itching    Shellfish containing products Swelling     Past Medical History:   Diagnosis Date    Allergy     Back pain     GERD (gastroesophageal reflux disease)      Past Surgical History:   Procedure Laterality Date    ANKLE SURGERY Left     ARTHROSCOPIC REPAIR OF ROTATOR CUFF OF SHOULDER Left 12/16/2022    Procedure: REPAIR, ROTATOR CUFF, ARTHROSCOPIC;  Surgeon: Montana Russ Jr., MD;  Location: [facility] OR;  Service: Orthopedics;  Laterality: Left;  need opus system [employer] notified cc    ARTHROSCOPY OF SHOULDER WITH DECOMPRESSION OF SUBACROMIAL SPACE Left 12/16/2022    Procedure: ARTHROSCOPY, SHOULDER, WITH SUBACROMIAL SPACE DECOMPRESSION;  Surgeon: Montana Russ Jr., MD;  Location: [facility] OR;  Service: Orthopedics;  Laterality: Left;    BACK SURGERY  01/30/2017    INJECTION, TENDON SHEATH OR LIGAMENT, 1 TENDON SHEATH OR LIGAMENT Right 12/16/2022    Procedure: INJECTION,TENDON SHEATH OR LIGAMENT,1 TENDON SHEATH OR LIGAMENT;  Surgeon: Montana Russ Jr., MD;  Location: [facility] OR;  Service: Orthopedics;  Laterality: Right;    KNEE SURGERY      x 5    VASECTOMY       Family History   Problem Relation Age of  Onset    Prostate cancer Father     Kidney cancer Neg Hx      Social History     Tobacco Use    Smoking status: Never    Smokeless tobacco: Never   Substance Use Topics    Alcohol use: Yes     Comment: occasional    Drug use: No     Review of Systems   Cardiovascular:  Negative for chest pain.   Gastrointestinal:  Positive for abdominal pain. Negative for nausea and vomiting.   Musculoskeletal:  Negative for back pain and myalgias.     Physical Exam     Initial Vitals [02/13/23 0802]   BP Pulse Resp Temp SpO2   (!) 161/87 63 19 97.5 °F (36.4 °C) 100 %      MAP       --         Physical Exam    Constitutional: He appears well-developed and well-nourished.   Eyes: Pupils are equal, round, and reactive to light.   Neck:   Normal range of motion.  Cardiovascular:  Normal rate, regular rhythm and normal heart sounds.           Abdominal: Abdomen is soft. There is abdominal tenderness.   TTP to RLQ  No rebound  No guarding  No peritoneal signs    Musculoskeletal:      Cervical back: Normal range of motion.     Neurological: He is alert and oriented to person, place, and time.       ED Course   Procedures  Labs Reviewed   CBC W/ AUTO DIFFERENTIAL - Abnormal; Notable for the following components:       Result Value    WBC 14.82 (*)     MCV 79 (*)     MCH 25.9 (*)     Gran # (ANC) 9.8 (*)     Immature Grans (Abs) 0.07 (*)     Mono # 1.2 (*)     All other components within normal limits   COMPREHENSIVE METABOLIC PANEL - Abnormal; Notable for the following components:    Glucose 140 (*)     All other components within normal limits   URINALYSIS, REFLEX TO URINE CULTURE - Abnormal; Notable for the following components:    Specific Gravity, UA >=1.030 (*)     All other components within normal limits    Narrative:     Preferred Collection Type->Urine, Clean Catch  Specimen Source->Urine   LIPASE   DRUG SCREEN PANEL, URINE EMERGENCY    Narrative:     Specimen Source->Urine   SARS-COV-2 RNA AMPLIFICATION, QUAL    Narrative:     Is  the patient symptomatic?->Yes          Imaging Results               CT Abdomen Pelvis With Contrast (Final result)  Result time 02/13/23 09:57:54      Final result by Michael Han MD (02/13/23 09:57:54)                   Impression:      Findings concerning for early/mild acute uncomplicated appendicitis without evidence of rupture or abscess, as described above.  Consider surgical consultation and further follow-up as warranted.    Additional findings as above.    This report was flagged in Epic as abnormal.      Electronically signed by: Michael Han  Date:    02/13/2023  Time:    09:57               Narrative:    EXAMINATION:  CT ABDOMEN PELVIS WITH CONTRAST    CLINICAL HISTORY:  Bowel obstruction suspected;    TECHNIQUE:  Low dose axial images, sagittal and coronal reformations were obtained from the lung bases to the pubic symphysis following the IV administration of 100 mL of Omnipaque 350 .  Oral contrast was not given. All CT scans at this location are performed using dose modulation techniques as appropriate to a performed exam including the following: Automated exposure control; adjustment of the mA and/or kV according to patient size (this includes techniques or standardized protocols for targeted exams where dose is matched to indication/reason for exam; i. e. extremities or head); use of iterative reconstruction technique.    COMPARISON:  CT abdomen pelvis 12/22/2022    FINDINGS:  LOWER CHEST: Mild bibasilar dependent density possibly representing atelectasis.    HEPATOBILIARY: Unremarkable liver. Unremarkable gallbladder. Unremarkable bile ducts.    SPLEEN/ADRENAL/PANCREAS: Unremarkable.    GENITOURINARY: No nephrolithiasis or obstructive uropathy.  No solid renal mass.  Unremarkable ureters and bladder.  Mildly enlarged prostate.    RETROPERITONEUM:  No pathologic adenopathy.    BOWEL: Mildly dilated appendix with mild appendiceal wall thickening and a small appendicolith at the proximal  portion.  Minimal periappendiceal inflammatory changes.  No intra-abdominal abscess.  No bowel obstruction visualized.  No free air or ascites.    VASCULAR: No abdominal aortic aneurysm.    BODY WALL: Unremarkable.    BONES: No acute fracture or aggressive osseous lesion. Lumbar instrumented fusion changes.                                       Medications   ketorolac (TORADOL) 30 mg/mL (1 mL) injection (has no administration in time range)   promethazine (PHENERGAN) 25 mg/mL injection (has no administration in time range)   HYDROmorphone (DILAUDID) 1 mg/mL injection (has no administration in time range)   piperacillin-tazobactam (ZOSYN) 4.5 gram injection (has no administration in time range)   ondansetron injection 4 mg (4 mg Intravenous Given 2/13/23 0818)   sodium chloride 0.9% bolus 1,000 mL 1,000 mL (0 mLs Intravenous Stopped 2/13/23 0919)   ketorolac injection 15 mg (15 mg Intravenous Given 2/13/23 0851)   iohexoL (OMNIPAQUE 350) injection 100 mL (100 mLs Intravenous Given 2/13/23 0925)   promethazine (PHENERGAN) 12.5 mg in dextrose 5 % (D5W) 50 mL IVPB (0 mg Intravenous Stopped 2/13/23 1119)   HYDROmorphone injection 1 mg (1 mg Intravenous Given 2/13/23 1058)   piperacillin-tazobactam (ZOSYN) 4.5 g in dextrose 5 % in water (D5W) 5 % 100 mL IVPB (MB+) (0 g Intravenous Stopped 2/13/23 1209)   HYDROmorphone injection 1 mg (1 mg Intravenous Given 2/13/23 1406)     Medical Decision Making:   History:   I obtained history from: someone other than patient.       <> Summary of History: Discussed with HAJA Sauer regarding workup, diagnosis and disposition.   Initial Assessment:   54 yo M presents as an ED-to-ED transfer for acute appendicitis;  Differential Diagnosis:   Appendicitis, cholecystitis, GERD, gastroenteritis   Clinical Tests:   Lab Tests: Reviewed  Radiological Study: Reviewed  ED Management:  - Dr. Cano informed about the presence of the patient in the ED; he stated to the charge nurse that he  will place orders for patient be taken emergently to the operating room for presumed appendectomy; patient made aware  Other:   I have discussed this case with another health care provider.       <> Summary of the Discussion: CT scan findings; need for surgical intervention                        Clinical Impression:   Final diagnoses:  [K37] Appendicitis (Primary)  [R11.2] Nausea and vomiting, unspecified vomiting type        ED Disposition Condition    ED to ED Transfer Stable                Saeed Mitchell MD  02/13/23 1415       Saeed Mitchell MD  02/13/23 1416

## 2023-02-13 NOTE — ED NOTES
Pt aao times 4. Resp even non labored. Pt kneeling on floor dry heaving at this time. Pt given towel and sheet placed to kneel by. Pt encouraged to get in the bed when possible. Pt reports upper stomach pain and dry heaving started at 2:00 am. Last BM this morning. Pt calm but restless.

## 2023-02-13 NOTE — TRANSFER OF CARE
Anesthesia Transfer of Care Note    Patient: Esa Yang    Procedure(s) Performed: Procedure(s) (LRB):  APPENDECTOMY, LAPAROSCOPIC (N/A)    Patient location: PACU    Anesthesia Type: general    Transport from OR: Transported from OR on 6-10 L/min O2 by face mask with adequate spontaneous ventilation    Post pain: adequate analgesia    Post assessment: no apparent anesthetic complications    Post vital signs: stable    Level of consciousness: responds to stimulation    Nausea/Vomiting: no nausea/vomiting    Complications: none    Transfer of care protocol was followed      Last vitals:   Visit Vitals  /89   Pulse 78   Temp 36.7 °C (98 °F)   Resp 18   Ht 6' (1.829 m)   Wt 122.5 kg (270 lb)   SpO2 94%   BMI 36.62 kg/m²

## 2023-02-13 NOTE — PLAN OF CARE
Pt for d/c to home this afternoon   Wife Toyin   -- to transport to home   Pt will be contacted per Scheduling Navigators with Surgery f/u apt   Discharging Nurse to review d/c meds/instructions.        02/13/23 2967   Final Note   Assessment Type Final Discharge Note   Anticipated Discharge Disposition Home   What phone number can be called within the next 1-3 days to see how you are doing after discharge? [phone]   Hospital Resources/Appts/Education Provided Appointments scheduled and added to AVS  (pt will be contacted with Surgery f/u per Scheduling Navigators)   Post-Acute Status   Discharge Delays None known at this time

## 2023-02-13 NOTE — ED NOTES
Pt reports pain to abdomen upper states it is worse. Lucina SMYTH made aware. No new orders at this time. No dry heaving . Pt in bed

## 2023-02-13 NOTE — H&P
Today`s Date: 2/13/2023     Admit Date: 2/13/2023    Admitting Physician: Babs Cano MD    Patient`s Name: Esa Yang , 53 y.o. male    HISTORY AND CHIEF COMPLAINT  Admitted with diffuse abdominal pain associated with dry heaves   Mild fever   Patient Active Problem List   Diagnosis    Knee pain    Palpitations    Chest pain    Medial meniscus tear    Loose body in knee    Chondromalacia, knee    S/P L knee surgery, arthroscopic medial/lateral menisectomy, loose body removal (12/31/14)    Nocturia    Benign prostatic hyperplasia with urinary frequency    Hesitancy    Slow urinary stream    Post-void dribbling    Low testosterone in male    Fatigue    Trigger middle finger of right hand    Nontraumatic incomplete tear of left rotator cuff       Past Medical History:   Diagnosis Date    Allergy     Back pain     GERD (gastroesophageal reflux disease)        Past Surgical History:   Procedure Laterality Date    ANKLE SURGERY Left     ARTHROSCOPIC REPAIR OF ROTATOR CUFF OF SHOULDER Left 12/16/2022    Procedure: REPAIR, ROTATOR CUFF, ARTHROSCOPIC;  Surgeon: Montana Russ Jr., MD;  Location: [facility] OR;  Service: Orthopedics;  Laterality: Left;  need opus system [employer] notified cc    ARTHROSCOPY OF SHOULDER WITH DECOMPRESSION OF SUBACROMIAL SPACE Left 12/16/2022    Procedure: ARTHROSCOPY, SHOULDER, WITH SUBACROMIAL SPACE DECOMPRESSION;  Surgeon: Montana Russ Jr., MD;  Location: [facility] OR;  Service: Orthopedics;  Laterality: Left;    BACK SURGERY  01/30/2017    INJECTION, TENDON SHEATH OR LIGAMENT, 1 TENDON SHEATH OR LIGAMENT Right 12/16/2022    Procedure: INJECTION,TENDON SHEATH OR LIGAMENT,1 TENDON SHEATH OR LIGAMENT;  Surgeon: Montana Russ Jr., MD;  Location: [facility] OR;  Service: Orthopedics;  Laterality: Right;    KNEE SURGERY      x 5    VASECTOMY         Prior to Admission medications    Medication Sig Start Date End Date Taking? Authorizing Provider   diazePAM (VALIUM) 5 MG tablet Take 1  tablet (5 mg total) by mouth every 6 (six) hours as needed for Anxiety. 1/24/22 2/23/22  Montana Russ Jr., MD   erythromycin (ROMYCIN) ophthalmic ointment Place a 1/2 inch ribbon of ointment into the lower eyelid of each eye 4 times daily.  Patient not taking: Reported on 1/30/2023 4/27/22   Kendrick Downs MD   gabapentin (NEURONTIN) 100 MG capsule Take 100 mg by mouth 3 (three) times daily.    Historical Provider   oxyCODONE-acetaminophen (PERCOCET) 7.5-325 mg per tablet Take 1 tablet by mouth every 4 (four) hours as needed for Pain.  Patient not taking: Reported on 1/30/2023 12/16/22   Montana Russ Jr., MD   pep injection Inject .25 ml as directed     For compounding pharmacy use:   Add PAPAVERINE 30 mcg  Add PHENTOLAMINE 10 mg  Add ALPROSTADIL 100 mcg 1/11/19   Jamey Rodrigues MD   promethazine (PHENERGAN) 25 MG tablet Take 1 tablet (25 mg total) by mouth every 6 (six) hours as needed for Nausea.  Patient not taking: Reported on 1/30/2023 12/22/22   Sergey Salgado MD   tamsulosin (FLOMAX) 0.4 mg Cap Take 1 capsule (0.4 mg total) by mouth once daily. 1/11/19 1/30/23  Jamey Rodrigues MD   testosterone cypionate (DEPOTESTOTERONE CYPIONATE) 200 mg/mL injection Inject 1 mL (200 mg total) into the muscle every 14 (fourteen) days. 9/19/18 11/14/22  Jamey Rodrigues MD     Current Facility-Administered Medications on File Prior to Encounter   Medication Dose Route Frequency Provider Last Rate Last Admin    triamcinolone acetonide injection 40 mg  40 mg Intra-articular  Sergey Kruse MD   40 mg at 11/11/14 1327     Current Outpatient Medications on File Prior to Encounter   Medication Sig Dispense Refill    diazePAM (VALIUM) 5 MG tablet Take 1 tablet (5 mg total) by mouth every 6 (six) hours as needed for Anxiety. 4 tablet 0    erythromycin (ROMYCIN) ophthalmic ointment Place a 1/2 inch ribbon of ointment into the lower eyelid of each eye 4 times daily. (Patient not taking: Reported on 1/30/2023)  3.5 g 0    gabapentin (NEURONTIN) 100 MG capsule Take 100 mg by mouth 3 (three) times daily.      oxyCODONE-acetaminophen (PERCOCET) 7.5-325 mg per tablet Take 1 tablet by mouth every 4 (four) hours as needed for Pain. (Patient not taking: Reported on 1/30/2023) 40 tablet 0    pep injection Inject .25 ml as directed     For compounding pharmacy use:   Add PAPAVERINE 30 mcg  Add PHENTOLAMINE 10 mg  Add ALPROSTADIL 100 mcg 2 vial 11    promethazine (PHENERGAN) 25 MG tablet Take 1 tablet (25 mg total) by mouth every 6 (six) hours as needed for Nausea. (Patient not taking: Reported on 1/30/2023) 15 tablet 0    tamsulosin (FLOMAX) 0.4 mg Cap Take 1 capsule (0.4 mg total) by mouth once daily. 30 capsule 11    testosterone cypionate (DEPOTESTOTERONE CYPIONATE) 200 mg/mL injection Inject 1 mL (200 mg total) into the muscle every 14 (fourteen) days. 10 mL 1        Review of patient's allergies indicates:   Allergen Reactions    Sulfa (sulfonamide antibiotics) Swelling    Neosporin (neomycin-polymyx) Rash     Topical irritation     Aspirin Swelling    Latex, natural rubber Hives and Itching    Shellfish containing products Swelling       Social History:   reports that he has never smoked. He has never used smokeless tobacco. He reports current alcohol use. He reports that he does not use drugs.     Family History   Problem Relation Age of Onset    Prostate cancer Father     Kidney cancer Neg Hx        PHYSICAL EXAMINATION  Temp:  [97.5 °F (36.4 °C)] 97.5 °F (36.4 °C)  Pulse:  [63] 63  Resp:  [19] 19  SpO2:  [100 %] 100 %  BP: (161)/(87) 161/87    General Condition:   Alert x 3  in pain     Head & Neck  Anemia: None  Jaundice: None  Neck vein: Not distended  Carotid Bruits: none  Lymph nodes: none palpable  Thyroid: normal    Chest: normal    Heart: normal    Rt. Breast: not examined  Lt. Breast: not examined  Axillary lymph nodes: none    Abdomen: Soft,  None tender with no palpable mass or organ  Hernia: none    Rectal:  Defered    Extremities: normal    Vascular: normal    Specific focus Examination    Imp: acute appendicitis    Plan: Lap appendectomy today.

## 2023-02-13 NOTE — ANESTHESIA PROCEDURE NOTES
Intubation    Date/Time: 2/13/2023 3:04 PM  Performed by: Stacey Alatorre CRNA  Authorized by: Too Corbin Jr., MD     Intubation:     Induction:  Intravenous    Intubated:  Postinduction    Mask Ventilation:  Easy mask    Attempts:  1    Attempted By:  CRNA    Method of Intubation:  Video laryngoscopy    Blade:  Serrano 3    Laryngeal View Grade: Grade I - full view of cords      Difficult Airway Encountered?: No      Complications:  None    Airway Device:  Oral endotracheal tube    Airway Device Size:  7.5    Style/Cuff Inflation:  Cuffed (inflated to minimal occlusive pressure)    Tube secured:  21    Secured at:  The lips    Placement Verified By:  Capnometry    Complicating Factors:  None    Findings Post-Intubation:  BS equal bilateral and atraumatic/condition of teeth unchanged

## 2023-02-13 NOTE — ED PROVIDER NOTES
Encounter Date: 2/13/2023       History     Chief Complaint   Patient presents with    Abdominal Pain     Pt C/O generalized ABD pain with N/V.  Pt denies diarrhea.  Reports possible constipation.        HPI: Esa Yang, a 53 y.o. male  has a past medical history of Allergy, Back pain, and GERD (gastroesophageal reflux disease).     He presents to the ED for evaluation of N/V with abdominal pain and distention that started last night.  States that he had a small amount of stool passage.  No previous h/o abdominal surgeries.  No treatments tried at home.                Review of patient's allergies indicates:   Allergen Reactions    Sulfa (sulfonamide antibiotics) Swelling    Neosporin (neomycin-polymyx) Rash     Topical irritation     Aspirin Swelling    Latex, natural rubber Hives and Itching    Shellfish containing products Swelling     Past Medical History:   Diagnosis Date    Allergy     Back pain     GERD (gastroesophageal reflux disease)      Past Surgical History:   Procedure Laterality Date    ANKLE SURGERY Left     ARTHROSCOPIC REPAIR OF ROTATOR CUFF OF SHOULDER Left 12/16/2022    Procedure: REPAIR, ROTATOR CUFF, ARTHROSCOPIC;  Surgeon: Montana Russ Jr., MD;  Location: [facility] OR;  Service: Orthopedics;  Laterality: Left;  need opus system [employer] notified cc    ARTHROSCOPY OF SHOULDER WITH DECOMPRESSION OF SUBACROMIAL SPACE Left 12/16/2022    Procedure: ARTHROSCOPY, SHOULDER, WITH SUBACROMIAL SPACE DECOMPRESSION;  Surgeon: Montana Russ Jr., MD;  Location: [facility] OR;  Service: Orthopedics;  Laterality: Left;    BACK SURGERY  01/30/2017    INJECTION, TENDON SHEATH OR LIGAMENT, 1 TENDON SHEATH OR LIGAMENT Right 12/16/2022    Procedure: INJECTION,TENDON SHEATH OR LIGAMENT,1 TENDON SHEATH OR LIGAMENT;  Surgeon: Montana Russ Jr., MD;  Location: [facility] OR;  Service: Orthopedics;  Laterality: Right;    KNEE SURGERY      x 5    VASECTOMY       Family History   Problem Relation Age of Onset    Prostate  cancer Father     Kidney cancer Neg Hx      Social History     Tobacco Use    Smoking status: Never    Smokeless tobacco: Never   Substance Use Topics    Alcohol use: Yes     Comment: occasional    Drug use: No     Review of Systems   Constitutional:  Negative for fever.        Distressed   Respiratory:  Negative for cough.    Cardiovascular:  Negative for chest pain.   Gastrointestinal:  Positive for abdominal pain, nausea and vomiting.   Endocrine: Negative for polydipsia.   Genitourinary:  Negative for dysuria.   Musculoskeletal:  Negative for back pain.   Skin:  Negative for rash.   Allergic/Immunologic: Negative for immunocompromised state.   Neurological:  Negative for headaches.   Hematological:  Negative for adenopathy.   Psychiatric/Behavioral:  Negative for agitation.    All other systems reviewed and are negative.    Physical Exam     Initial Vitals [02/13/23 0802]   BP Pulse Resp Temp SpO2   (!) 161/87 63 19 97.5 °F (36.4 °C) 100 %      MAP       --         Physical Exam    Nursing note and vitals reviewed.  Constitutional: He appears well-developed and well-nourished. He appears distressed.   HENT:   Head: Normocephalic and atraumatic.   Right Ear: External ear normal.   Left Ear: External ear normal.   Nose: Nose normal.   Mouth/Throat: Oropharynx is clear and moist.   Eyes: EOM are normal.   Neck: Neck supple.   Normal range of motion.  Cardiovascular:  Normal rate and regular rhythm.           Pulmonary/Chest: Breath sounds normal. No respiratory distress. He has no wheezes. He has no rhonchi. He has no rales.   Abdominal: Abdomen is soft. He exhibits no distension. There is generalized abdominal tenderness. There is no rebound and no guarding.   Musculoskeletal:         General: No tenderness or edema. Normal range of motion.      Cervical back: Normal range of motion and neck supple.     Lymphadenopathy:     He has no cervical adenopathy.   Neurological: He is alert and oriented to person, place,  and time. No cranial nerve deficit.   Skin: Skin is warm and dry. Capillary refill takes less than 2 seconds. No rash and no abscess noted. No erythema.   Psychiatric: He has a normal mood and affect. Thought content normal.       ED Course   Procedures  Labs Reviewed   CBC W/ AUTO DIFFERENTIAL - Abnormal; Notable for the following components:       Result Value    WBC 14.82 (*)     MCV 79 (*)     MCH 25.9 (*)     Gran # (ANC) 9.8 (*)     Immature Grans (Abs) 0.07 (*)     Mono # 1.2 (*)     All other components within normal limits   COMPREHENSIVE METABOLIC PANEL - Abnormal; Notable for the following components:    Glucose 140 (*)     All other components within normal limits   URINALYSIS, REFLEX TO URINE CULTURE - Abnormal; Notable for the following components:    Specific Gravity, UA >=1.030 (*)     All other components within normal limits    Narrative:     Preferred Collection Type->Urine, Clean Catch  Specimen Source->Urine   LIPASE   DRUG SCREEN PANEL, URINE EMERGENCY    Narrative:     Specimen Source->Urine   SARS-COV-2 RNA AMPLIFICATION, QUAL    Narrative:     Is the patient symptomatic?->Yes          Imaging Results               CT Abdomen Pelvis With Contrast (Final result)  Result time 02/13/23 09:57:54      Final result by Michael Han MD (02/13/23 09:57:54)                   Impression:      Findings concerning for early/mild acute uncomplicated appendicitis without evidence of rupture or abscess, as described above.  Consider surgical consultation and further follow-up as warranted.    Additional findings as above.    This report was flagged in Epic as abnormal.      Electronically signed by: Michael Han  Date:    02/13/2023  Time:    09:57               Narrative:    EXAMINATION:  CT ABDOMEN PELVIS WITH CONTRAST    CLINICAL HISTORY:  Bowel obstruction suspected;    TECHNIQUE:  Low dose axial images, sagittal and coronal reformations were obtained from the lung bases to the pubic symphysis  following the IV administration of 100 mL of Omnipaque 350 .  Oral contrast was not given. All CT scans at this location are performed using dose modulation techniques as appropriate to a performed exam including the following: Automated exposure control; adjustment of the mA and/or kV according to patient size (this includes techniques or standardized protocols for targeted exams where dose is matched to indication/reason for exam; i. e. extremities or head); use of iterative reconstruction technique.    COMPARISON:  CT abdomen pelvis 12/22/2022    FINDINGS:  LOWER CHEST: Mild bibasilar dependent density possibly representing atelectasis.    HEPATOBILIARY: Unremarkable liver. Unremarkable gallbladder. Unremarkable bile ducts.    SPLEEN/ADRENAL/PANCREAS: Unremarkable.    GENITOURINARY: No nephrolithiasis or obstructive uropathy.  No solid renal mass.  Unremarkable ureters and bladder.  Mildly enlarged prostate.    RETROPERITONEUM:  No pathologic adenopathy.    BOWEL: Mildly dilated appendix with mild appendiceal wall thickening and a small appendicolith at the proximal portion.  Minimal periappendiceal inflammatory changes.  No intra-abdominal abscess.  No bowel obstruction visualized.  No free air or ascites.    VASCULAR: No abdominal aortic aneurysm.    BODY WALL: Unremarkable.    BONES: No acute fracture or aggressive osseous lesion. Lumbar instrumented fusion changes.                                       Medications   ketorolac (TORADOL) 30 mg/mL (1 mL) injection (has no administration in time range)   promethazine (PHENERGAN) 25 mg/mL injection (has no administration in time range)   HYDROmorphone (DILAUDID) 1 mg/mL injection (has no administration in time range)   piperacillin-tazobactam (ZOSYN) 4.5 gram injection (has no administration in time range)   ondansetron injection 4 mg (4 mg Intravenous Given 2/13/23 0818)   sodium chloride 0.9% bolus 1,000 mL 1,000 mL (0 mLs Intravenous Stopped 2/13/23 0919)    ketorolac injection 15 mg (15 mg Intravenous Given 2/13/23 8448)   iohexoL (OMNIPAQUE 350) injection 100 mL (100 mLs Intravenous Given 2/13/23 0952)   promethazine (PHENERGAN) 12.5 mg in dextrose 5 % (D5W) 50 mL IVPB (0 mg Intravenous Stopped 2/13/23 1119)   HYDROmorphone injection 1 mg (1 mg Intravenous Given 2/13/23 1058)   piperacillin-tazobactam (ZOSYN) 4.5 g in dextrose 5 % in water (D5W) 5 % 100 mL IVPB (MB+) (0 g Intravenous Stopped 2/13/23 1209)   HYDROmorphone injection 1 mg (1 mg Intravenous Given 2/13/23 1406)     Medical Decision Making:   Initial Assessment:   N/V with generalized abdominal pain   Differential Diagnosis:   Differential Diagnosis includes, but is not limited to:  Bowel obstruction, incarcerated/strangulated hernia, ileus, appendicitis, cholecystitis, aspirated foreign body, esophageal food impaction, biliary colic, colitis/diverticulitis, gastroenteritis, esophagitis, hepatitis, pancreatitis, GERD, PUD, constipation, nephrolithiasis, UTI/pyelonephritis.      ED Management:  Pt presents to ED for evaluation of generalized abdominal pain with N/V.  Fluids, zofran and toradol given with improvement of N/V but continuation of pain.  Dilaudid and phenergan given with improvement of pain.   WBC elevated at 14.8k.  No other concerning abnormalities on CMP, lipase or UA.  Ct concerning for acute appendicitis w/o rupture or abscess formation.  Consulted with Dr. Cano who requested zosyn and to place the pt NPO for likely surgery this afternoon. D/t bed capacity issues, pt will be transferred ED to ED.                          Clinical Impression:   Final diagnoses:  [K37] Appendicitis (Primary)  [R11.2] Nausea and vomiting, unspecified vomiting type        ED Disposition Condition    ED to ED Transfer Stable                Lucina Couch PA-C  02/13/23 5752

## 2023-02-13 NOTE — NURSING
Pt. Oriented to room and call light. Focused assessment completed. DBP elevated. Dr. Cano notified. MD stated to place verbal order for one time dose of Toradol. MD stated OK to continue discharge for Pt. Once he has voided, pain is controlled, tolerating PO liquids, and VSS.  Abdominal site dressings re-enforced due to scant drainage. Pt. Wife at bedside. Pt. Notified of need to void and tolerate PO fluids prior to being discharged. Pt. Verbalized understanding.

## 2023-02-14 NOTE — NURSING
Spoke to patient via phone. Introduced as VN for night shift that will be instructing him on discharge instructions. Educated patient on reason for admission; medications to hold, continue, and start; care of incision; appointment to follow-up with doctor, and when to return to ED.  Number given for 24/7 Nurse Line.  Education per flowsheet using teach back method.  Opportunity given for questions and questions answered.  GISELLE Vargas notified of completion of discharge education.   Family member at bedside. Patient discharged.

## 2023-02-15 NOTE — ANESTHESIA POSTPROCEDURE EVALUATION
Anesthesia Post Evaluation    Patient: Esa Yang    Procedure(s) Performed: Procedure(s) (LRB):  APPENDECTOMY, LAPAROSCOPIC (N/A)  REPAIR, HERNIA, UMBILICAL    Final Anesthesia Type: general      Patient location during evaluation: PACU  Patient participation: Yes- Able to Participate  Level of consciousness: awake and alert and oriented  Post-procedure vital signs: reviewed and stable  Pain management: adequate  Airway patency: patent    PONV status at discharge: No PONV  Anesthetic complications: no      Cardiovascular status: blood pressure returned to baseline, hemodynamically stable and stable  Respiratory status: unassisted, room air and spontaneous ventilation  Hydration status: euvolemic  Follow-up not needed.          Vitals Value Taken Time   /95 02/13/23 1732   Temp 36.6 °C (97.8 °F) 02/13/23 1732   Pulse 95 02/13/23 1732   Resp 19 02/13/23 1732   SpO2 94 % 02/13/23 1732         Event Time   Out of Recovery 17:05:59         Pain/Melissa Score: No data recorded

## 2023-02-16 LAB
FINAL PATHOLOGIC DIAGNOSIS: NORMAL
FINAL PATHOLOGIC DIAGNOSIS: NORMAL
GROSS: NORMAL
GROSS: NORMAL
Lab: NORMAL
Lab: NORMAL

## 2023-02-17 ENCOUNTER — PATIENT MESSAGE (OUTPATIENT)
Dept: SPORTS MEDICINE | Facility: CLINIC | Age: 53
End: 2023-02-17
Payer: COMMERCIAL

## 2023-02-18 ENCOUNTER — NURSE TRIAGE (OUTPATIENT)
Dept: ADMINISTRATIVE | Facility: CLINIC | Age: 53
End: 2023-02-18
Payer: COMMERCIAL

## 2023-02-18 ENCOUNTER — HOSPITAL ENCOUNTER (EMERGENCY)
Facility: HOSPITAL | Age: 53
Discharge: HOME | End: 2023-02-18
Attending: FAMILY MEDICINE
Payer: COMMERCIAL

## 2023-02-18 VITALS
TEMPERATURE: 99 F | DIASTOLIC BLOOD PRESSURE: 88 MMHG | WEIGHT: 265 LBS | HEIGHT: 72 IN | RESPIRATION RATE: 16 BRPM | BODY MASS INDEX: 35.89 KG/M2 | SYSTOLIC BLOOD PRESSURE: 135 MMHG | HEART RATE: 88 BPM | OXYGEN SATURATION: 100 %

## 2023-02-18 DIAGNOSIS — K59.00 CONSTIPATION, UNSPECIFIED CONSTIPATION TYPE: ICD-10-CM

## 2023-02-18 DIAGNOSIS — G89.18 POST-OPERATIVE PAIN: Primary | ICD-10-CM

## 2023-02-18 DIAGNOSIS — R14.0 BLOATING: ICD-10-CM

## 2023-02-18 LAB
ALBUMIN SERPL BCP-MCNC: 4.7 G/DL (ref 3.5–5.2)
ALP SERPL-CCNC: 107 U/L (ref 38–126)
ALT SERPL W/O P-5'-P-CCNC: 17 U/L (ref 10–44)
ANION GAP SERPL CALC-SCNC: 5 MMOL/L (ref 8–16)
AST SERPL-CCNC: 24 U/L (ref 15–46)
BASOPHILS # BLD AUTO: 0.02 K/UL (ref 0–0.2)
BASOPHILS NFR BLD: 0.1 % (ref 0–1.9)
BILIRUB SERPL-MCNC: 1.9 MG/DL (ref 0.1–1)
CALCIUM SERPL-MCNC: 9 MG/DL (ref 8.7–10.5)
CHLORIDE SERPL-SCNC: 97 MMOL/L (ref 95–110)
CO2 SERPL-SCNC: 32 MMOL/L (ref 23–29)
CREAT SERPL-MCNC: 1.29 MG/DL (ref 0.5–1.4)
DIFFERENTIAL METHOD: ABNORMAL
EOSINOPHIL # BLD AUTO: 0.2 K/UL (ref 0–0.5)
EOSINOPHIL NFR BLD: 1.6 % (ref 0–8)
ERYTHROCYTE [DISTWIDTH] IN BLOOD BY AUTOMATED COUNT: 13.9 % (ref 11.5–14.5)
EST. GFR  (NO RACE VARIABLE): >60 ML/MIN/1.73 M^2
GLUCOSE SERPL-MCNC: 108 MG/DL (ref 70–110)
HCT VFR BLD AUTO: 37.7 % (ref 40–54)
HGB BLD-MCNC: 12.2 G/DL (ref 14–18)
IMM GRANULOCYTES # BLD AUTO: 0.19 K/UL (ref 0–0.04)
IMM GRANULOCYTES NFR BLD AUTO: 1.4 % (ref 0–0.5)
LYMPHOCYTES # BLD AUTO: 2.2 K/UL (ref 1–4.8)
LYMPHOCYTES NFR BLD: 16 % (ref 18–48)
MCH RBC QN AUTO: 25.8 PG (ref 27–31)
MCHC RBC AUTO-ENTMCNC: 32.4 G/DL (ref 32–36)
MCV RBC AUTO: 80 FL (ref 82–98)
MONOCYTES # BLD AUTO: 1.3 K/UL (ref 0.3–1)
MONOCYTES NFR BLD: 9.6 % (ref 4–15)
NEUTROPHILS # BLD AUTO: 9.6 K/UL (ref 1.8–7.7)
NEUTROPHILS NFR BLD: 71.3 % (ref 38–73)
NRBC BLD-RTO: 0 /100 WBC
PLATELET # BLD AUTO: 246 K/UL (ref 150–450)
PMV BLD AUTO: 10 FL (ref 9.2–12.9)
POTASSIUM SERPL-SCNC: 4.1 MMOL/L (ref 3.5–5.1)
PROT SERPL-MCNC: 8.3 G/DL (ref 6–8.4)
RBC # BLD AUTO: 4.73 M/UL (ref 4.6–6.2)
SODIUM SERPL-SCNC: 134 MMOL/L (ref 136–145)
UUN UR-MCNC: 19 MG/DL (ref 2–20)
WBC # BLD AUTO: 13.44 K/UL (ref 3.9–12.7)

## 2023-02-18 PROCEDURE — 80053 COMPREHEN METABOLIC PANEL: CPT | Mod: ER | Performed by: FAMILY MEDICINE

## 2023-02-18 PROCEDURE — 96374 THER/PROPH/DIAG INJ IV PUSH: CPT | Mod: 59,ER

## 2023-02-18 PROCEDURE — 96375 TX/PRO/DX INJ NEW DRUG ADDON: CPT | Mod: ER

## 2023-02-18 PROCEDURE — 25000003 PHARM REV CODE 250: Mod: ER | Performed by: FAMILY MEDICINE

## 2023-02-18 PROCEDURE — 96361 HYDRATE IV INFUSION ADD-ON: CPT | Mod: ER

## 2023-02-18 PROCEDURE — 85025 COMPLETE CBC W/AUTO DIFF WBC: CPT | Mod: ER | Performed by: FAMILY MEDICINE

## 2023-02-18 PROCEDURE — 25500020 PHARM REV CODE 255: Mod: ER | Performed by: FAMILY MEDICINE

## 2023-02-18 PROCEDURE — 99285 EMERGENCY DEPT VISIT HI MDM: CPT | Mod: 25,ER

## 2023-02-18 PROCEDURE — 63600175 PHARM REV CODE 636 W HCPCS: Mod: ER | Performed by: FAMILY MEDICINE

## 2023-02-18 RX ORDER — FAMOTIDINE 20 MG/1
20 TABLET, FILM COATED ORAL 2 TIMES DAILY
Qty: 60 TABLET | Refills: 0 | Status: SHIPPED | OUTPATIENT
Start: 2023-02-18 | End: 2024-03-01

## 2023-02-18 RX ORDER — DIPHENHYDRAMINE HYDROCHLORIDE 50 MG/ML
25 INJECTION INTRAMUSCULAR; INTRAVENOUS
Status: COMPLETED | OUTPATIENT
Start: 2023-02-18 | End: 2023-02-18

## 2023-02-18 RX ORDER — FAMOTIDINE 10 MG/ML
20 INJECTION INTRAVENOUS
Status: COMPLETED | OUTPATIENT
Start: 2023-02-18 | End: 2023-02-18

## 2023-02-18 RX ADMIN — IOHEXOL 100 ML: 350 INJECTION, SOLUTION INTRAVENOUS at 04:02

## 2023-02-18 RX ADMIN — FAMOTIDINE 20 MG: 10 INJECTION INTRAVENOUS at 05:02

## 2023-02-18 RX ADMIN — DIPHENHYDRAMINE HYDROCHLORIDE 25 MG: 50 INJECTION, SOLUTION INTRAMUSCULAR; INTRAVENOUS at 04:02

## 2023-02-18 RX ADMIN — SODIUM CHLORIDE 1000 ML: 0.9 INJECTION, SOLUTION INTRAVENOUS at 03:02

## 2023-02-18 NOTE — ED PROVIDER NOTES
"Encounter Date: 2/18/2023       History     Chief Complaint   Patient presents with    Abdominal Pain     Had his appendix removed and a hernia repair on Monday. Has felt "full" since. Is passing stool regularly but feels as if his ABD is not getting any smaller. "Feels like Im going to pop like a balloon".     53-year-old male had appendicitis on 02/13- underwent surgery.  Surgical notes reviewed for perforated appendix.  Also had hernia repair.  Patient was discharged home continues to have bloated stomach.  Mild abdominal pain.  Patient took MiraLax and milk of magnesium with normal bowel movement last night.  Continues to feel his abdomen distended/bloated.  Decreased appetite.  No fever.    The history is provided by the patient.   Review of patient's allergies indicates:   Allergen Reactions    Sulfa (sulfonamide antibiotics) Swelling    Neosporin (neomycin-polymyx) Rash     Topical irritation     Aspirin Swelling    Latex, natural rubber Hives and Itching    Shellfish containing products Swelling     Past Medical History:   Diagnosis Date    Allergy     Back pain     GERD (gastroesophageal reflux disease)      Past Surgical History:   Procedure Laterality Date    ANKLE SURGERY Left     ARTHROSCOPIC REPAIR OF ROTATOR CUFF OF SHOULDER Left 12/16/2022    Procedure: REPAIR, ROTATOR CUFF, ARTHROSCOPIC;  Surgeon: Montana Russ Jr., MD;  Location: [facility] OR;  Service: Orthopedics;  Laterality: Left;  need opus system [employer] notified cc    ARTHROSCOPY OF SHOULDER WITH DECOMPRESSION OF SUBACROMIAL SPACE Left 12/16/2022    Procedure: ARTHROSCOPY, SHOULDER, WITH SUBACROMIAL SPACE DECOMPRESSION;  Surgeon: Montana Russ Jr., MD;  Location: [facility] OR;  Service: Orthopedics;  Laterality: Left;    BACK SURGERY  01/30/2017    INJECTION, TENDON SHEATH OR LIGAMENT, 1 TENDON SHEATH OR LIGAMENT Right 12/16/2022    Procedure: INJECTION,TENDON SHEATH OR LIGAMENT,1 TENDON SHEATH OR LIGAMENT;  Surgeon: Montana Russ Jr., MD;  " Location: [facility] OR;  Service: Orthopedics;  Laterality: Right;    KNEE SURGERY      x 5    LAPAROSCOPIC APPENDECTOMY N/A 2/13/2023    Procedure: APPENDECTOMY, LAPAROSCOPIC;  Surgeon: Babs Cano MD;  Location: [facility] OR;  Service: General;  Laterality: N/A;    REPAIR, HERNIA, UMBILICAL  2/13/2023    Procedure: REPAIR, HERNIA, UMBILICAL;  Surgeon: Babs Cano MD;  Location: [facility] OR;  Service: General;;    VASECTOMY       Family History   Problem Relation Age of Onset    Prostate cancer Father     Kidney cancer Neg Hx      Social History     Tobacco Use    Smoking status: Never    Smokeless tobacco: Never   Substance Use Topics    Alcohol use: Yes     Comment: occasional    Drug use: No     Review of Systems   Constitutional:  Negative for fever.   HENT:  Negative for sore throat.    Respiratory:  Negative for shortness of breath.    Cardiovascular:  Negative for chest pain.   Gastrointestinal:  Positive for abdominal distention, abdominal pain and nausea. Negative for constipation and vomiting.   Genitourinary:  Negative for dysuria.   Musculoskeletal:  Negative for back pain.   Skin:  Negative for rash.   Neurological:  Negative for weakness.   Hematological:  Does not bruise/bleed easily.   All other systems reviewed and are negative.    Physical Exam     Initial Vitals [02/18/23 1505]   BP Pulse Resp Temp SpO2   133/78 88 16 98.9 °F (37.2 °C) 100 %      MAP       --         Physical Exam    Nursing note and vitals reviewed.  Constitutional: Vital signs are normal. He appears well-developed and well-nourished. He is active. No distress.   HENT:   Head: Normocephalic.   Nose: Nose normal.   Mouth/Throat: Oropharynx is clear and moist and mucous membranes are normal.   Eyes: Conjunctivae, EOM and lids are normal.   Neck: Neck supple.   Normal range of motion.  Cardiovascular:  Normal rate, regular rhythm, S1 normal, S2 normal and normal heart sounds.           Pulmonary/Chest: Breath sounds normal. No  respiratory distress. He has no wheezes. He has no rhonchi. He has no rales. He exhibits no tenderness.   Abdominal: Abdomen is soft. Bowel sounds are normal. He exhibits distension. There is no abdominal tenderness.   Laparoscopic surgical wound clean - no drainage. There is no rebound and no guarding.   Musculoskeletal:         General: Normal range of motion.      Right upper arm: Normal.      Left upper arm: Normal.      Cervical back: Normal range of motion and neck supple.      Right lower leg: Normal.      Left lower leg: Normal.     Neurological: He is alert and oriented to person, place, and time. He has normal strength. GCS score is 15. GCS eye subscore is 4. GCS verbal subscore is 5. GCS motor subscore is 6.   Skin: Skin is warm. Capillary refill takes less than 2 seconds.   Psychiatric: He has a normal mood and affect. His speech is normal and behavior is normal. Thought content normal. Cognition and memory are normal.       ED Course   Procedures  Labs Reviewed   CBC W/ AUTO DIFFERENTIAL - Abnormal; Notable for the following components:       Result Value    WBC 13.44 (*)     Hemoglobin 12.2 (*)     Hematocrit 37.7 (*)     MCV 80 (*)     MCH 25.8 (*)     Immature Granulocytes 1.4 (*)     Gran # (ANC) 9.6 (*)     Immature Grans (Abs) 0.19 (*)     Mono # 1.3 (*)     Lymph % 16.0 (*)     All other components within normal limits   COMPREHENSIVE METABOLIC PANEL - Abnormal; Notable for the following components:    Sodium 134 (*)     CO2 32 (*)     Total Bilirubin 1.9 (*)     Anion Gap 5 (*)     All other components within normal limits          Imaging Results              CT Abdomen Pelvis With Contrast (Final result)  Result time 02/18/23 17:07:32      Final result by Royer Bangura MD (02/18/23 17:07:32)                   Impression:      Mild hyperdense fluid distributed in the peritoneal cavity may relate to hemorrhage or other.  Correlate clinically.    Fatty infiltration liver    All CT scans at this  facility use dose modulation, iterative reconstruction, and/or weight based dosing when appropriate to reduce radiation dose to as low as reasonably achievable.      Electronically signed by: Royer Ramoni  Date:    02/18/2023  Time:    17:07               Narrative:    EXAMINATION:  CT ABDOMEN PELVIS WITH CONTRAST    CLINICAL HISTORY:  Abdominal pain, acute, nonlocalized;    TECHNIQUE:  Low dose axial images, sagittal and coronal reformations were obtained from the lung bases to the pubic symphysis following the IV administration of 100 mL of Omnipaque 350.    COMPARISON:  None    FINDINGS:  Heart: Normal size as far as seen. No effusion as far as seen.    Lung Bases: Clear.    Liver: Fatty infiltration of the liver..  No focal lesions.    Gallbladder: No calcified gallstones.    Bile Ducts: No dilatation.    Pancreas: No mass. No peripancreatic fat stranding.    Spleen: Normal.    Adrenals: Normal.    Kidneys/Ureters: Normal enhancement.  No mass or  hydroureteronephrosis.    Bladder: No wall thickening.    Reproductive organs: Normal.    GI Tract/Mesentery: No evidence of bowel obstruction or inflammation.  Postsurgical changes in the right lower quadrant.    Peritoneal Space: Mild hyperdense fluid distributed in the peritoneal cavity may relate to hemorrhage.  Correlate clinically.    Retroperitoneum: No significant adenopathy.    Abdominal wall: Postsurgical changes in the Francia umbilical region.  Correlate to port site a is a    Vasculature: No aneurysm.    Bones: No acute fracture. No suspicious lytic or sclerotic lesions.  Postsurgical changes with posterior ela fixation at L3-L4-L5                                       X-Ray Abdomen Flat And Erect (Final result)  Result time 02/18/23 15:51:57      Final result by Donald Avila MD (02/18/23 15:51:57)                   Impression:      No acute radiographic abnormality of the abdomen.      Electronically signed by: Donald  Austin  Date:    02/18/2023  Time:    15:51               Narrative:    EXAMINATION:  XR ABDOMEN FLAT AND ERECT    CLINICAL HISTORY:  Abdominal distension (gaseous)    TECHNIQUE:  Flat and erect AP views of the abdomen were performed.    COMPARISON:  CT dated 02/13/2023    FINDINGS:  Nonobstructive bowel gas pattern noted.  Mild gas throughout the small and large intestine.  No evidence of pneumoperitoneum.  No abnormal calcifications seen.  No acute bony abnormality.  Posterior lumbar fusion hardware appears intact.  Visualized lower lungs are clear.                                       Medications   sodium chloride 0.9% bolus 1,000 mL 1,000 mL (0 mLs Intravenous Stopped 2/18/23 1655)   diphenhydrAMINE injection 25 mg (25 mg Intravenous Given 2/18/23 1620)   iohexoL (OMNIPAQUE 350) injection 100 mL (100 mLs Intravenous Given 2/18/23 1628)   famotidine (PF) injection 20 mg (20 mg Intravenous Given 2/18/23 1733)     Medical Decision Making:   Initial Assessment:   Bloating and distention of abdomen since patient had appendix and hernia repair on 02/13.  Decreased appetite.  Decreased bowel sounds.  Minimal tenderness is diffuse.  Surgical wounds no drainage.  Differential Diagnosis:   Perforated appendix/abscess, peritonitis, sepsis.   INTESTINAL PERFORATION. / OBSTRUCTION.  CONSTIPATION.   Independently Interpreted Test(s):   I have ordered and independently interpreted X-rays - see summary below.  Clinical Tests:   Lab Tests: Ordered and Reviewed  The following lab test(s) were unremarkable: CMP       <> Summary of Lab: WBC- 13.44    Radiological Study: Ordered and Reviewed  ED Management:  Patient vitals are stable.  Afebrile.   CT - MINIMAL FLUID IN PELVIS - ?BLOOD. Post surgical finding.   D/W  - F/U IN CLINIC.                          Clinical Impression:   Final diagnoses:  [R14.0] Bloating  [G89.18] Post-operative pain (Primary)        ED Disposition Condition    Discharge Stable          ED  Prescriptions       Medication Sig Dispense Start Date End Date Auth. Provider    famotidine (PEPCID) 20 MG tablet Take 1 tablet (20 mg total) by mouth 2 (two) times daily. 60 tablet 2/18/2023 -- Juan Carlos Lopez MD          Follow-up Information       Follow up With Specialties Details Why Contact Info    Babs Cano MD General Surgery, Surgery Call in 1 day  [address] [facility]  [address]  [facility] [address] [phone]               Juan Carlos Lopez MD  02/19/23 0603

## 2023-02-18 NOTE — TELEPHONE ENCOUNTER
Spoke with patient states he is s/p hernia repair and appendectomy from 2/13 with Dr. Cano.  Patient states he is not eating much.  He reports drinking little fluids but denies having symptoms of dehydration.  Patient states he has increased abdominal bloating and a full feeling.  Spoke with on call provider Dr. Cano who states patient should go to ER for evaluation.  Patient given information and he verbalized understanding.         Reason for Disposition   [1] Caller has URGENT question AND [2] triager unable to answer question    Additional Information   Negative: Sounds like a life-threatening emergency to the triager   Negative: [1] Widespread rash AND [2] bright red, sunburn-like   Negative: [1] SEVERE headache AND [2] after spinal (epidural) anesthesia   Negative: [1] Vomiting AND [2] persists > 4 hours   Negative: [1] Vomiting AND [2] abdomen looks much more swollen than usual   Negative: [1] Drinking very little AND [2] dehydration suspected (e.g., no urine > 12 hours, very dry mouth, very lightheaded)   Negative: Patient sounds very sick or weak to the triager   Negative: Sounds like a serious complication to the triager   Negative: Fever > 100.4 F (38.0 C)   Negative: [1] SEVERE post-op pain (e.g., excruciating, pain scale 8-10) AND [2] not controlled with pain medications    Protocols used: Post-Op Symptoms and [id number]-TP

## 2023-02-27 ENCOUNTER — TELEPHONE (OUTPATIENT)
Dept: SPORTS MEDICINE | Facility: CLINIC | Age: 53
End: 2023-02-27
Payer: COMMERCIAL

## 2023-02-27 NOTE — TELEPHONE ENCOUNTER
Spoke to patient and let himknow appt has been rescheduled to 3/16/23 at 11:30 am. Patient confirmed

## 2023-02-28 PROBLEM — Z98.890 S/P HERNIA REPAIR: Status: ACTIVE | Noted: 2023-02-28

## 2023-02-28 PROBLEM — Z90.49 S/P APPENDECTOMY: Status: ACTIVE | Noted: 2023-02-28

## 2023-02-28 PROBLEM — Z87.19 S/P HERNIA REPAIR: Status: ACTIVE | Noted: 2023-02-28

## 2023-03-08 NOTE — PROGRESS NOTES
Subjective:      Patient ID: Esa Yang is a 53 y.o. male.  Chief Complaint: Post-op Evaluation (L Shoulder )      HPI  Esa Yang is a  53 y.o. male presenting today for a 3mos post op visit.      Operative procedure:  1.  Left shoulder arthroscopy with subacromial decompression.    2. Left shoulder arthroscopic rotator cuff repair using opus suture anchor X 1.    3. Left shoulder arthroscopic acromioclavicular joint resection.    4. Injection tendon sheath right middle finger with Kenalog 20 mg 0.5 cc xylocaine.      Surgeon: French  DOS: 12-    The patient is doing very well.  He continues to attend physical therapy for shoulder range-of-motion exercises and gradual strengthening. He is currently not able to lift anything greater than about 20 lb.    Occupation:  Patient notes that returning to work requires full duty capabilities inability to lift greater than 50 lbs    Review of patient's allergies indicates:   Allergen Reactions    Sulfa (sulfonamide antibiotics) Swelling    Neosporin (neomycin-polymyx) Rash     Topical irritation     Aspirin Swelling    Latex, natural rubber Hives and Itching    Shellfish containing products Swelling         Current Outpatient Medications   Medication Sig Dispense Refill    tamsulosin (FLOMAX) 0.4 mg Cap Take 1 capsule (0.4 mg total) by mouth once daily. 30 capsule 11    diazePAM (VALIUM) 5 MG tablet Take 1 tablet (5 mg total) by mouth every 6 (six) hours as needed for Anxiety. 4 tablet 0    famotidine (PEPCID) 20 MG tablet Take 1 tablet (20 mg total) by mouth 2 (two) times daily. (Patient not taking: Reported on 3/13/2023) 60 tablet 0    gabapentin (NEURONTIN) 100 MG capsule Take 100 mg by mouth 3 (three) times daily.      pep injection Inject .25 ml as directed     For compounding pharmacy use:   Add PAPAVERINE 30 mcg  Add PHENTOLAMINE 10 mg  Add ALPROSTADIL 100 mcg (Patient not taking: Reported on 3/13/2023) 2 vial 11    testosterone cypionate  (DEPOTESTOTERONE CYPIONATE) 200 mg/mL injection Inject 1 mL (200 mg total) into the muscle every 14 (fourteen) days. 10 mL 1     No current facility-administered medications for this visit.     Facility-Administered Medications Ordered in Other Visits   Medication Dose Route Frequency Provider Last Rate Last Admin    triamcinolone acetonide injection 40 mg  40 mg Intra-articular  Sergey Kruse MD   40 mg at 11/11/14 1327       Past Medical History:   Diagnosis Date    Allergy     Back pain     GERD (gastroesophageal reflux disease)        Past Surgical History:   Procedure Laterality Date    ANKLE SURGERY Left     ARTHROSCOPIC REPAIR OF ROTATOR CUFF OF SHOULDER Left 12/16/2022    Procedure: REPAIR, ROTATOR CUFF, ARTHROSCOPIC;  Surgeon: Montana Russ Jr., MD;  Location: [facility] OR;  Service: Orthopedics;  Laterality: Left;  need opus system [employer] notified cc    ARTHROSCOPY OF SHOULDER WITH DECOMPRESSION OF SUBACROMIAL SPACE Left 12/16/2022    Procedure: ARTHROSCOPY, SHOULDER, WITH SUBACROMIAL SPACE DECOMPRESSION;  Surgeon: Montana Russ Jr., MD;  Location: [facility] OR;  Service: Orthopedics;  Laterality: Left;    BACK SURGERY  01/30/2017    INJECTION, TENDON SHEATH OR LIGAMENT, 1 TENDON SHEATH OR LIGAMENT Right 12/16/2022    Procedure: INJECTION,TENDON SHEATH OR LIGAMENT,1 TENDON SHEATH OR LIGAMENT;  Surgeon: Montana Russ Jr., MD;  Location: [facility] OR;  Service: Orthopedics;  Laterality: Right;    KNEE SURGERY      x 5    LAPAROSCOPIC APPENDECTOMY N/A 2/13/2023    Procedure: APPENDECTOMY, LAPAROSCOPIC;  Surgeon: Babs Cano MD;  Location: [facility] OR;  Service: General;  Laterality: N/A;    REPAIR, HERNIA, UMBILICAL  2/13/2023    Procedure: REPAIR, HERNIA, UMBILICAL;  Surgeon: Babs Cano MD;  Location: [facility] OR;  Service: General;;    VASECTOMY         OBJECTIVE:   PHYSICAL EXAM:  Height: 6' (182.9 cm) Weight: 120.2 kg (265 lb)  Vitals:    03/13/23 1025   Weight: 120.2 kg (265 lb)   Height:  6' (1.829 m)   PainSc:   5         Ortho/SPM Exam  Examination left shoulder   Surgical incisions are well healed with appropriate scar formation   AROM: Forward flexion to 120°, external rotation to 45°, abduction to 90°  Motor strength: 4/5 to supraspinatus testing compared to contralateral extremity.  5/5 biceps testing  Sensation grossly intact distally  NTTP to soft tissues and bony prominences throughout shoulder girdle  Brisk cap refill to finger pads  Radial pulse palpable    RADIOGRAPHS:  No new imaging obtained today    ASSESSMENT/PLAN:     IMPRESSION:  Status post rotator cuff repair left shoulder    PLAN:    The patient continues to do very well with shoulder range-of-motion and slow gradual strengthening.  He would benefit from continued therapy as he regains his strength in anticipation of returning to work.    PT: Formal referral update provided for strengthening program    Pain control: Rest, ice, PT, we discussed intermittent soreness to shoulder is expected    Work release:  No lifting, pushing or pulling with the left shoulder greater than 15 lb at this time.  Okay to return to sedentary desk activities only.  We will re-evaluate his work restrictions at his next appointment in 6 weeks    FOLLOW UP: 6wks for re-evaluation

## 2023-03-13 ENCOUNTER — OFFICE VISIT (OUTPATIENT)
Dept: ORTHOPEDICS | Facility: CLINIC | Age: 53
End: 2023-03-13
Payer: COMMERCIAL

## 2023-03-13 VITALS — BODY MASS INDEX: 35.89 KG/M2 | HEIGHT: 72 IN | WEIGHT: 265 LBS

## 2023-03-13 DIAGNOSIS — Z98.890 S/P ROTATOR CUFF REPAIR: Primary | ICD-10-CM

## 2023-03-13 PROCEDURE — 1160F PR REVIEW ALL MEDS BY PRESCRIBER/CLIN PHARMACIST DOCUMENTED: ICD-10-PCS | Mod: CPTII,S$GLB,, | Performed by: PHYSICIAN ASSISTANT

## 2023-03-13 PROCEDURE — 3008F PR BODY MASS INDEX (BMI) DOCUMENTED: ICD-10-PCS | Mod: CPTII,S$GLB,, | Performed by: PHYSICIAN ASSISTANT

## 2023-03-13 PROCEDURE — 99999 PR PBB SHADOW E&M-EST. PATIENT-LVL IV: ICD-10-PCS | Mod: PBBFAC,,, | Performed by: PHYSICIAN ASSISTANT

## 2023-03-13 PROCEDURE — 1160F RVW MEDS BY RX/DR IN RCRD: CPT | Mod: CPTII,S$GLB,, | Performed by: PHYSICIAN ASSISTANT

## 2023-03-13 PROCEDURE — 1159F PR MEDICATION LIST DOCUMENTED IN MEDICAL RECORD: ICD-10-PCS | Mod: CPTII,S$GLB,, | Performed by: PHYSICIAN ASSISTANT

## 2023-03-13 PROCEDURE — 3008F BODY MASS INDEX DOCD: CPT | Mod: CPTII,S$GLB,, | Performed by: PHYSICIAN ASSISTANT

## 2023-03-13 PROCEDURE — 99024 PR POST-OP FOLLOW-UP VISIT: ICD-10-PCS | Mod: S$GLB,,, | Performed by: PHYSICIAN ASSISTANT

## 2023-03-13 PROCEDURE — 99024 POSTOP FOLLOW-UP VISIT: CPT | Mod: S$GLB,,, | Performed by: PHYSICIAN ASSISTANT

## 2023-03-13 PROCEDURE — 1159F MED LIST DOCD IN RCRD: CPT | Mod: CPTII,S$GLB,, | Performed by: PHYSICIAN ASSISTANT

## 2023-03-13 PROCEDURE — 99999 PR PBB SHADOW E&M-EST. PATIENT-LVL IV: CPT | Mod: PBBFAC,,, | Performed by: PHYSICIAN ASSISTANT

## 2023-03-15 ENCOUNTER — TELEPHONE (OUTPATIENT)
Dept: ORTHOPEDICS | Facility: CLINIC | Age: 53
End: 2023-03-15
Payer: COMMERCIAL

## 2023-03-15 NOTE — TELEPHONE ENCOUNTER
----- Message from Kinza Alexander sent at 3/15/2023 11:09 AM CDT -----  Contact: Pt  .Type:  Patient Returning Call    Who Called:Pt  Does the patient know what this is regarding?:yes  Would the patient rather a call back or a response via [facility]? call  Best Call Back Number:[phone]  Additional Information:

## 2023-03-16 ENCOUNTER — OFFICE VISIT (OUTPATIENT)
Dept: SPORTS MEDICINE | Facility: CLINIC | Age: 53
End: 2023-03-16
Payer: COMMERCIAL

## 2023-03-16 VITALS
SYSTOLIC BLOOD PRESSURE: 134 MMHG | DIASTOLIC BLOOD PRESSURE: 87 MMHG | WEIGHT: 275 LBS | BODY MASS INDEX: 37.25 KG/M2 | HEIGHT: 72 IN | HEART RATE: 80 BPM

## 2023-03-16 DIAGNOSIS — M17.12 PRIMARY OSTEOARTHRITIS OF LEFT KNEE: Primary | ICD-10-CM

## 2023-03-16 DIAGNOSIS — G89.29 CHRONIC PAIN OF LEFT KNEE: ICD-10-CM

## 2023-03-16 DIAGNOSIS — Z98.890 S/P ACL RECONSTRUCTION: ICD-10-CM

## 2023-03-16 DIAGNOSIS — M25.462 EFFUSION OF LEFT KNEE JOINT: ICD-10-CM

## 2023-03-16 DIAGNOSIS — M25.562 CHRONIC PAIN OF LEFT KNEE: ICD-10-CM

## 2023-03-16 PROCEDURE — 3075F PR MOST RECENT SYSTOLIC BLOOD PRESS GE 130-139MM HG: ICD-10-PCS | Mod: CPTII,S$GLB,, | Performed by: PHYSICIAN ASSISTANT

## 2023-03-16 PROCEDURE — 1160F PR REVIEW ALL MEDS BY PRESCRIBER/CLIN PHARMACIST DOCUMENTED: ICD-10-PCS | Mod: CPTII,S$GLB,, | Performed by: PHYSICIAN ASSISTANT

## 2023-03-16 PROCEDURE — 20610 DRAIN/INJ JOINT/BURSA W/O US: CPT | Mod: LT,S$GLB,, | Performed by: PHYSICIAN ASSISTANT

## 2023-03-16 PROCEDURE — 3079F PR MOST RECENT DIASTOLIC BLOOD PRESSURE 80-89 MM HG: ICD-10-PCS | Mod: CPTII,S$GLB,, | Performed by: PHYSICIAN ASSISTANT

## 2023-03-16 PROCEDURE — 3008F BODY MASS INDEX DOCD: CPT | Mod: CPTII,S$GLB,, | Performed by: PHYSICIAN ASSISTANT

## 2023-03-16 PROCEDURE — 99214 PR OFFICE/OUTPT VISIT, EST, LEVL IV, 30-39 MIN: ICD-10-PCS | Mod: 25,S$GLB,, | Performed by: PHYSICIAN ASSISTANT

## 2023-03-16 PROCEDURE — 99999 PR PBB SHADOW E&M-EST. PATIENT-LVL III: CPT | Mod: PBBFAC,,, | Performed by: PHYSICIAN ASSISTANT

## 2023-03-16 PROCEDURE — 1160F RVW MEDS BY RX/DR IN RCRD: CPT | Mod: CPTII,S$GLB,, | Performed by: PHYSICIAN ASSISTANT

## 2023-03-16 PROCEDURE — 3008F PR BODY MASS INDEX (BMI) DOCUMENTED: ICD-10-PCS | Mod: CPTII,S$GLB,, | Performed by: PHYSICIAN ASSISTANT

## 2023-03-16 PROCEDURE — 20610 LARGE JOINT ASPIRATION/INJECTION: L KNEE: ICD-10-PCS | Mod: LT,S$GLB,, | Performed by: PHYSICIAN ASSISTANT

## 2023-03-16 PROCEDURE — 1159F PR MEDICATION LIST DOCUMENTED IN MEDICAL RECORD: ICD-10-PCS | Mod: CPTII,S$GLB,, | Performed by: PHYSICIAN ASSISTANT

## 2023-03-16 PROCEDURE — 3079F DIAST BP 80-89 MM HG: CPT | Mod: CPTII,S$GLB,, | Performed by: PHYSICIAN ASSISTANT

## 2023-03-16 PROCEDURE — 3075F SYST BP GE 130 - 139MM HG: CPT | Mod: CPTII,S$GLB,, | Performed by: PHYSICIAN ASSISTANT

## 2023-03-16 PROCEDURE — 99214 OFFICE O/P EST MOD 30 MIN: CPT | Mod: 25,S$GLB,, | Performed by: PHYSICIAN ASSISTANT

## 2023-03-16 PROCEDURE — 99999 PR PBB SHADOW E&M-EST. PATIENT-LVL III: ICD-10-PCS | Mod: PBBFAC,,, | Performed by: PHYSICIAN ASSISTANT

## 2023-03-16 PROCEDURE — 1159F MED LIST DOCD IN RCRD: CPT | Mod: CPTII,S$GLB,, | Performed by: PHYSICIAN ASSISTANT

## 2023-03-16 NOTE — PROGRESS NOTES
CC: Left knee pain    Patient is a 53-year-old male who presents today for follow-up evaluation of left knee pain.  He had his knee aspirated followed by a corticosteroid injection at his last visit which only provided relief for approximately 3-4 weeks.  His knee has been swollen for the past week or 2.  He denies any new injury.  He has continued anti-inflammatory medication, rice therapy, and regular exercise programs with little relief.  He has also tried corticosteroid and viscosupplementation injections which have failed.  He is interested in other injection options to prolonged knee replacement surgery for the next couple of years if possible.  He would like to have his knee drained again today.      PreviousHPI (2/9/2023):  Patient is a 53-year-old male who presents today for follow-up evaluation of left knee pain.  He has a history of left knee arthroscopy with Dr. Kruse in 2014 as well as prior ACL/PCL reconstruction many years ago.  Patient reports that he began experiencing acute onset left knee pain with associated swelling 3-4 days ago.  He is not recall any recent fall, injury, or trauma to the left knee.  Pain is described as a dull ache that is worse with bending the knee, weight-bearing, and going up and down stairs.  He denies any associated numbness or tingling of the left lower extremity.  There is no associated subjective instability or mechanical grinding/catching.  He has tried corticosteroid injections and viscosupplementation injections in the past.  He has been taking over-the-counter anti-inflammatory medication which does provide mild relief.    Mechanical symptoms: catching  Subjective instability: (--)   Worse with knee flexion and squats  Better with rest.   Nocturnal symptoms: (+)    No previous surgeries or trauma on right knee      DATE OF PROCEDURE: 12/31/2014     PREOPERATIVE DIAGNOSES:   1. Left knee medial meniscus tear.   2. Left knee lateral meniscus tear      POSTOPERATIVE DIAGNOSES:   1. Left knee medial meniscus tear.   2. Left knee lateral meniscus tear  3. Left knee loose body     PROCEDURES:   1. Left knee arthroscopic partial medial and lateral meniscectomies  2. Left knee arthroscopic loose body removal     SURGEON: Sergey Kruse M.D.     REVIEW OF SYSTEMS:   Constitution: Negative. Negative for chills, fever and night sweats.   HENT: Negative for congestion and headaches.    Eyes: Negative for blurred vision, left vision loss and right vision loss.   Cardiovascular: Negative for chest pain and syncope.   Respiratory: Negative for cough and shortness of breath.    Endocrine: Negative for polydipsia, polyphagia and polyuria.   Hematologic/Lymphatic: Negative for bleeding problem. Does not bruise/bleed easily.   Skin: Negative for dry skin, itching and rash.   Musculoskeletal: Negative for falls. Positive for left knee pain and  muscle weakness.   Gastrointestinal: Negative for abdominal pain and bowel incontinence.   Genitourinary: Negative for bladder incontinence and nocturia.   Neurological: Negative for disturbances in coordination, loss of balance and seizures.   Psychiatric/Behavioral: Negative for depression. The patient does not have insomnia.    Allergic/Immunologic: Negative for hives and persistent infections.     PAST MEDICAL HISTORY:   Past Medical History:   Diagnosis Date    Allergy     Back pain     GERD (gastroesophageal reflux disease)        PAST SURGICAL HISTORY:   Past Surgical History:   Procedure Laterality Date    ANKLE SURGERY Left     ARTHROSCOPIC REPAIR OF ROTATOR CUFF OF SHOULDER Left 12/16/2022    Procedure: REPAIR, ROTATOR CUFF, ARTHROSCOPIC;  Surgeon: Montana Russ Jr., MD;  Location: [facility];  Service: Orthopedics;  Laterality: Left;  need opus system [place] notified cc    ARTHROSCOPY OF SHOULDER WITH DECOMPRESSION OF SUBACROMIAL SPACE Left 12/16/2022    Procedure: ARTHROSCOPY, SHOULDER, WITH SUBACROMIAL SPACE  DECOMPRESSION;  Surgeon: Montana Russ Jr., MD;  Location: [facility] OR;  Service: Orthopedics;  Laterality: Left;    BACK SURGERY  01/30/2017    INJECTION, TENDON SHEATH OR LIGAMENT, 1 TENDON SHEATH OR LIGAMENT Right 12/16/2022    Procedure: INJECTION,TENDON SHEATH OR LIGAMENT,1 TENDON SHEATH OR LIGAMENT;  Surgeon: Montana Russ Jr., MD;  Location: [facility] OR;  Service: Orthopedics;  Laterality: Right;    KNEE SURGERY      x 5    LAPAROSCOPIC APPENDECTOMY N/A 2/13/2023    Procedure: APPENDECTOMY, LAPAROSCOPIC;  Surgeon: Babs Cano MD;  Location: [facility] OR;  Service: General;  Laterality: N/A;    REPAIR, HERNIA, UMBILICAL  2/13/2023    Procedure: REPAIR, HERNIA, UMBILICAL;  Surgeon: Babs Cano MD;  Location: [facility] OR;  Service: General;;    VASECTOMY         FAMILY HISTORY:   Family History   Problem Relation Age of Onset    Prostate cancer Father     Kidney cancer Neg Hx        SOCIAL HISTORY:   Social History     Socioeconomic History    Marital status:    Tobacco Use    Smoking status: Never    Smokeless tobacco: Never   Substance and Sexual Activity    Alcohol use: Yes     Comment: occasional    Drug use: No    Sexual activity: Yes       MEDICATIONS:     Current Outpatient Medications:     gabapentin (NEURONTIN) 100 MG capsule, Take 100 mg by mouth 3 (three) times daily., Disp: , Rfl:     diazePAM (VALIUM) 5 MG tablet, Take 1 tablet (5 mg total) by mouth every 6 (six) hours as needed for Anxiety., Disp: 4 tablet, Rfl: 0    famotidine (PEPCID) 20 MG tablet, Take 1 tablet (20 mg total) by mouth 2 (two) times daily. (Patient not taking: Reported on 3/13/2023), Disp: 60 tablet, Rfl: 0    pep injection, Inject .25 ml as directed   For compounding pharmacy use:  Add PAPAVERINE 30 mcg Add PHENTOLAMINE 10 mg Add ALPROSTADIL 100 mcg (Patient not taking: Reported on 3/13/2023), Disp: 2 vial, Rfl: 11    tamsulosin (FLOMAX) 0.4 mg Cap, Take 1 capsule (0.4 mg total) by mouth once daily., Disp: 30  capsule, Rfl: 11    testosterone cypionate (DEPOTESTOTERONE CYPIONATE) 200 mg/mL injection, Inject 1 mL (200 mg total) into the muscle every 14 (fourteen) days., Disp: 10 mL, Rfl: 1  No current facility-administered medications for this visit.    Facility-Administered Medications Ordered in Other Visits:     triamcinolone acetonide injection 40 mg, 40 mg, Intra-articular, , Sergey Kruse MD, 40 mg at 11/11/14 1327    ALLERGIES:   Review of patient's allergies indicates:   Allergen Reactions    Sulfa (sulfonamide antibiotics) Swelling    Neosporin (neomycin-polymyx) Rash     Topical irritation     Aspirin Swelling    Latex, natural rubber Hives and Itching    Shellfish containing products Swelling       VITAL SIGNS:   /87   Pulse 80   Ht 6' (1.829 m)   Wt 124.7 kg (275 lb)   BMI 37.30 kg/m²      PHYSICAL EXAMINATION:  General:  The patient is alert and oriented x 3.  Mood is pleasant.  Observation of ears, eyes and nose reveal no gross abnormalities.  No labored breathing observed.    LEFT KNEE EXAMINATION     OBSERVATION / INSPECTION   Gait:   antalgic   Alignment:  Neutral   Scars:   None   Muscle atrophy: Mild  Effusion:  2+   Warmth:  None   Discoloration:   none     TENDERNESS / CREPITUS (T / C):          T / C      T / C   Patella   - / -   Lateral joint line   - / -   Peripatellar medial  -  Medial joint line    + / -   Peripatellar lateral +  Medial plica   - / -   Patellar tendon -   Popliteal fossa   - / -   Quad tendon   -   Gastrocnemius   -   Prepatellar Bursa - / -   Quadricep   -   Tibial tubercle  -  Thigh/hamstring  -   Pes anserine/HS -  Fibula    -   ITB   - / -  Tibia     -   Tib/fib joint  - / -  LCL    -     MFC   - / -   MCL: Proximal  -    LFC   - / -    Distal   -          ROM: (* = pain)  PASSIVE   ACTIVE    Left :   *5 / *5 / *110   *5 / *5 / *110     Right :    5 / 0 / 130   5 / 0 / 125    Patellofemoral examination:  See above noted areas of tenderness.   Patella  position    Subluxation / dislocation: Centered           Sup. / Inf;   Normal   Crepitus (PF):    Absent   Patellar Mobility:       Medial-lateral:   Normal    Superior-inferior:  Normal    Inferior tilt   Normal    Patellar tendon:  Normal   Lateral tilt:    Normal   J-sign:     None   Patellofemoral grind:   + pain       MENISCAL SIGNS:     Pain on terminal extension:  +  Pain on terminal flexion:  +  Jamess maneuver:  + (for medial)  Squat     + (for medial and lateral)    LIGAMENT EXAMINATION:  ACL / Lachman:  normal (-1 to 2mm)    PCL-Post.  drawer: normal 0 to 2mm  MCL- Valgus:  normal 0 to 2mm  LCL- Varus:  normal 0 to 2mm  Pivot shift: normal (Equal)   Dial Test: difference c/w other side   At 30° flexion: normal (< 5°)    At 90° flexion: normal (< 5°)   Reverse Pivot Shift:   normal (Equal)     STRENGTH: (* = with pain) PAINFUL SIDE   Quadricep   *4/5   Hamstrin/5    EXTREMITY NEURO-VASCULAR EXAMINATION:   Sensation:  Grossly intact to light touch all dermatomal regions.   Motor Function:  Fully intact motor function at hip, knee, foot and ankle    DTRs;  quadriceps and  achilles 2+.  No clonus and downgoing Babinski.    Vascular status:  DP and PT pulses 2+, brisk capillary refill, symmetric.     OTHER FINDINGS:  Pain with SLR    Radiographic Findings 2021:    Right: No fracture dislocation bone destruction seen.     Left: There is ACL repair.  There is moderate DJD and a varus deformity.  No fracture dislocation bone destruction or OCD seen.    Xrays of the knees were ordered and reviewed by me today. These findings were discussed and reviewed with the patient.    X-rays left knee (2023):  There is bilateral medial compartmental narrowing, left greater than right.  Surgical changes are noted of left knee cruciate ligament repair.  There is prominent osteophytosis arising from the medial femoral condyle and adjacent medial tibial plateau on the left.  No acute displaced fracture or  dislocation of the left or right knee.  No large knee joint effusion.  No radiopaque foreign body.    ASSESSMENT:      ICD-10-CM ICD-9-CM   1. Primary osteoarthritis of left knee  M17.12 715.16   2. S/P ACL reconstruction  Z98.890 V45.89   3. Acute pain of left knee  M25.562 719.46   4. Chronic pain of left knee  M25.562 719.46    G89.29 338.29         PLAN:     Prior imaging reviewed and discussed with patient detail.      25 cc's of normal joint fluid aspirated today. See procedure note for details.    We have discussed a variety of treatment options including medications, injections, physical therapy and other alternative treatments. I also explained the indications, risks and benefits of surgery.  Patient's pain is refractory to long-term use of PO/topical NSAIDs, physical therapy 6+ weeks, aerobic exercise, weightloss, walking aids, visco-supplementation, multiple corticosteroid injections, and he current treatment is the only effective treatment that relieved the patient's pain.  Recommending to continue Zilretta injection at this time.  Also recommending to continue HEP.  Patient agrees with treatment plan.    Pre-authorization placed for  Zilretta  injections.    Continue to rest, ice, elevate the left knee and take Meloxicam as needed for pain.    Follow-up in clinic for Zilretta injection.      All of the patient's questions were answered and the patient will contact us if they have any questions or concerns in the interim.    Medical Dictation software was used during the dictation of portions or the entirety of this medical record.  Phonetic or grammatic errors may exist due to the use of this software. For clarification, refer to the author of the document.

## 2023-03-16 NOTE — PROCEDURES
Large Joint Aspiration/Injection: L knee    Date/Time: 3/16/2023 11:30 AM  Performed by: Monico Ramos PA-C  Authorized by: Monico Ramos PA-C     Consent Done?:  Yes (Verbal)  Indications:  Joint swelling  Site marked: the procedure site was marked    Timeout: prior to procedure the correct patient, procedure, and site was verified    Prep: patient was prepped and draped in usual sterile fashion      Local anesthesia used?: Yes    Local anesthetic:  Lidocaine 1% without epinephrine  Anesthetic total (ml):  5      Details:  Needle Size:  18 G  Ultrasonic Guidance for needle placement?: No    Approach:  Superior  Location:  Knee  Site:  L knee  Aspirate amount (mL):  25  Aspirate:  Clear and yellow  Patient tolerance:  Patient tolerated the procedure well with no immediate complications    Aspiration Procedure  A time out was performed, including verification of patient ID, procedure, site and side, availability of information and equipment, review of safety issues, and agreement with consent, the procedure site was marked.    After time out was performed, the patient was prepped aseptically with povidone-iodine swabsticks. Approximately 10 cc of 1% lidocaine plain was injected with a 25-gauge needle into the aspiration site without difficulty.  25cc's of normal joint fluid were aspirated from the Superolateral  aspect of the left Knee Joint using an 18g x 1.5 needle in sterile fashion without complication. Bandage was applied.     Esa Yang was reminded to rest with RICE for 48 hours post injection and to call the clinic immediately for any adverse side effects as explained in clinic today.

## 2023-03-21 ENCOUNTER — TELEPHONE (OUTPATIENT)
Dept: ORTHOPEDICS | Facility: CLINIC | Age: 53
End: 2023-03-21
Payer: COMMERCIAL

## 2023-03-21 NOTE — TELEPHONE ENCOUNTER
Spoke with Ute Esa, in regards to his [facility] paperwork not being [facility]. I informed patient, that we received confirmation that fax was successfully sent on 3/15 at 7:44 AM. Fax number was verified with patient, and [facility] Paperwork was re-faxed. All questions answered and patient verbalized understanding.

## 2023-03-21 NOTE — TELEPHONE ENCOUNTER
----- Message from Lena Saleh sent at 3/21/2023 12:40 PM CDT -----  .Type:  FMLA Paperwork    Who Called: pt  Would the patient rather a call back or a response via [facility]? call  Best Call Back Number: [phone]  Additional Information:     Pt says that he has not received a call back about his FMLA paperwork and it was due Mar 19. [facility] is requesting paperwork in order for pts leave extension to be approved

## 2023-04-06 ENCOUNTER — OFFICE VISIT (OUTPATIENT)
Dept: SPORTS MEDICINE | Facility: CLINIC | Age: 53
End: 2023-04-06
Payer: COMMERCIAL

## 2023-04-06 VITALS
SYSTOLIC BLOOD PRESSURE: 144 MMHG | DIASTOLIC BLOOD PRESSURE: 79 MMHG | WEIGHT: 283 LBS | HEART RATE: 92 BPM | HEIGHT: 72 IN | BODY MASS INDEX: 38.33 KG/M2

## 2023-04-06 DIAGNOSIS — M17.12 PRIMARY OSTEOARTHRITIS OF LEFT KNEE: Primary | ICD-10-CM

## 2023-04-06 DIAGNOSIS — G89.29 CHRONIC PAIN OF LEFT KNEE: ICD-10-CM

## 2023-04-06 DIAGNOSIS — M25.562 CHRONIC PAIN OF LEFT KNEE: ICD-10-CM

## 2023-04-06 PROCEDURE — 20610 DRAIN/INJ JOINT/BURSA W/O US: CPT | Mod: LT,S$GLB,, | Performed by: PHYSICIAN ASSISTANT

## 2023-04-06 PROCEDURE — 3008F PR BODY MASS INDEX (BMI) DOCUMENTED: ICD-10-PCS | Mod: CPTII,S$GLB,, | Performed by: PHYSICIAN ASSISTANT

## 2023-04-06 PROCEDURE — 1160F RVW MEDS BY RX/DR IN RCRD: CPT | Mod: CPTII,S$GLB,, | Performed by: PHYSICIAN ASSISTANT

## 2023-04-06 PROCEDURE — 1160F PR REVIEW ALL MEDS BY PRESCRIBER/CLIN PHARMACIST DOCUMENTED: ICD-10-PCS | Mod: CPTII,S$GLB,, | Performed by: PHYSICIAN ASSISTANT

## 2023-04-06 PROCEDURE — 3008F BODY MASS INDEX DOCD: CPT | Mod: CPTII,S$GLB,, | Performed by: PHYSICIAN ASSISTANT

## 2023-04-06 PROCEDURE — 3078F DIAST BP <80 MM HG: CPT | Mod: CPTII,S$GLB,, | Performed by: PHYSICIAN ASSISTANT

## 2023-04-06 PROCEDURE — 3078F PR MOST RECENT DIASTOLIC BLOOD PRESSURE < 80 MM HG: ICD-10-PCS | Mod: CPTII,S$GLB,, | Performed by: PHYSICIAN ASSISTANT

## 2023-04-06 PROCEDURE — 20610 LARGE JOINT ASPIRATION/INJECTION: L KNEE: ICD-10-PCS | Mod: LT,S$GLB,, | Performed by: PHYSICIAN ASSISTANT

## 2023-04-06 PROCEDURE — 99499 UNLISTED E&M SERVICE: CPT | Mod: S$GLB,,, | Performed by: PHYSICIAN ASSISTANT

## 2023-04-06 PROCEDURE — 99499 NO LOS: ICD-10-PCS | Mod: S$GLB,,, | Performed by: PHYSICIAN ASSISTANT

## 2023-04-06 PROCEDURE — 1159F PR MEDICATION LIST DOCUMENTED IN MEDICAL RECORD: ICD-10-PCS | Mod: CPTII,S$GLB,, | Performed by: PHYSICIAN ASSISTANT

## 2023-04-06 PROCEDURE — 1159F MED LIST DOCD IN RCRD: CPT | Mod: CPTII,S$GLB,, | Performed by: PHYSICIAN ASSISTANT

## 2023-04-06 PROCEDURE — 3077F PR MOST RECENT SYSTOLIC BLOOD PRESSURE >= 140 MM HG: ICD-10-PCS | Mod: CPTII,S$GLB,, | Performed by: PHYSICIAN ASSISTANT

## 2023-04-06 PROCEDURE — 3077F SYST BP >= 140 MM HG: CPT | Mod: CPTII,S$GLB,, | Performed by: PHYSICIAN ASSISTANT

## 2023-04-06 PROCEDURE — 99999 PR PBB SHADOW E&M-EST. PATIENT-LVL III: ICD-10-PCS | Mod: PBBFAC,,, | Performed by: PHYSICIAN ASSISTANT

## 2023-04-06 PROCEDURE — 99999 PR PBB SHADOW E&M-EST. PATIENT-LVL III: CPT | Mod: PBBFAC,,, | Performed by: PHYSICIAN ASSISTANT

## 2023-04-06 NOTE — PROCEDURES
Large Joint Aspiration/Injection: L knee    Date/Time: 4/6/2023 2:30 PM  Performed by: Monico Ramos PA-C  Authorized by: Monico Ramos PA-C     Consent Done?:  Yes (Verbal)  Indications:  Pain and arthritis  Site marked: the procedure site was marked    Timeout: prior to procedure the correct patient, procedure, and site was verified    Prep: patient was prepped and draped in usual sterile fashion    Local anesthesia used?: No      Details:  Needle Size:  22 G  Ultrasonic Guidance for needle placement?: No    Approach:  Superior  Location:  Knee  Site:  L knee  Medications:  32 mg triamcinolone acetonide 32 mg  Patient tolerance:  Patient tolerated the procedure well with no immediate complications

## 2023-04-06 NOTE — PROGRESS NOTES
Patient is here for follow up of left knee arthritis. Pt is requesting Zilretta injection as discussed previously.  PMFH reviewed per encounter record. Has failed other conservative modalities including NSAIDS, activity modification, weight loss.     PHYSICAL EXAMINATION:      General: The patient is alert and oriented x 3. Mood is pleasant.   Observation of ears, eyes and nose reveals no gross abnormalities. No   labored breathing observed.      No signs of infection or adverse reaction to knee.    Injection Procedure  A time out was performed, including verification of patient ID, procedure, site and side, availability of information and equipment, review of safety issues, and agreement with consent, the procedure site was marked.    After time out was performed, the patient was prepped aseptically with povidone-iodine swabsticks. A diagnostic and therapeutic injection of 5cc Zilretta was given under sterile technique using a 22g x 1.5 needle from the Superolateral  aspect of the left Knee Joint in the supine position.      Esa Yang had no adverse reactions to the medication. Pain decreased. He was instructed to apply ice to the joint for 20 minutes and avoid strenuous activities for 24-36 hours following the injection. He was warned of possible blood sugar and/or blood pressure changes during that time. Following that time, he can resume regular activities.    He was reminded to call the clinic immediately for any adverse side effects as explained in clinic today.    Exp:  11/30/2023  Lot:

## 2023-04-14 NOTE — PROGRESS NOTES
Subjective:      Patient ID: Esa Yang is a 53 y.o. male.  Chief Complaint: Follow-up (4 mth p/o- left shoulder )      HPI  Esa Yang is a  53 y.o. male presenting today for a 4 mos post op visit.      Operative procedure:  1.  Left shoulder arthroscopy with subacromial decompression.    2. Left shoulder arthroscopic rotator cuff repair using opus suture anchor X 1.    3. Left shoulder arthroscopic acromioclavicular joint resection.    4. Injection tendon sheath right middle finger with Kenalog 20 mg 0.5 cc xylocaine.      Surgeon: French  DOS: 12-    Overall the patient is doing very well.  He is continued to work with physical therapy on job related exercises, and feels comfortable and returning to work at full duty.  He does continue to note shoulder pain at a few degrees of external rotation, but otherwise denies difficulties.    Review of patient's allergies indicates:   Allergen Reactions    Sulfa (sulfonamide antibiotics) Swelling    Neosporin (neomycin-polymyx) Rash     Topical irritation     Aspirin Swelling    Latex, natural rubber Hives and Itching    Shellfish containing products Swelling         Current Outpatient Medications   Medication Sig Dispense Refill    pep injection Inject .25 ml as directed     For compounding pharmacy use:   Add PAPAVERINE 30 mcg  Add PHENTOLAMINE 10 mg  Add ALPROSTADIL 100 mcg 2 vial 11    tamsulosin (FLOMAX) 0.4 mg Cap Take 1 capsule (0.4 mg total) by mouth once daily. 30 capsule 11    diazePAM (VALIUM) 5 MG tablet Take 1 tablet (5 mg total) by mouth every 6 (six) hours as needed for Anxiety. 4 tablet 0    famotidine (PEPCID) 20 MG tablet Take 1 tablet (20 mg total) by mouth 2 (two) times daily. (Patient not taking: Reported on 3/13/2023) 60 tablet 0    gabapentin (NEURONTIN) 100 MG capsule Take 100 mg by mouth 3 (three) times daily.      testosterone cypionate (DEPOTESTOTERONE CYPIONATE) 200 mg/mL injection Inject 1 mL (200 mg total) into the muscle  every 14 (fourteen) days. 10 mL 1     No current facility-administered medications for this visit.     Facility-Administered Medications Ordered in Other Visits   Medication Dose Route Frequency Provider Last Rate Last Admin    triamcinolone acetonide injection 40 mg  40 mg Intra-articular  Sergey Kruse MD   40 mg at 11/11/14 1327       Past Medical History:   Diagnosis Date    Allergy     Back pain     GERD (gastroesophageal reflux disease)        Past Surgical History:   Procedure Laterality Date    ANKLE SURGERY Left     ARTHROSCOPIC REPAIR OF ROTATOR CUFF OF SHOULDER Left 12/16/2022    Procedure: REPAIR, ROTATOR CUFF, ARTHROSCOPIC;  Surgeon: Montana Russ Jr., MD;  Location: [facility] OR;  Service: Orthopedics;  Laterality: Left;  need opus system [employer] notified cc    ARTHROSCOPY OF SHOULDER WITH DECOMPRESSION OF SUBACROMIAL SPACE Left 12/16/2022    Procedure: ARTHROSCOPY, SHOULDER, WITH SUBACROMIAL SPACE DECOMPRESSION;  Surgeon: Montana Russ Jr., MD;  Location: [facility] OR;  Service: Orthopedics;  Laterality: Left;    BACK SURGERY  01/30/2017    INJECTION, TENDON SHEATH OR LIGAMENT, 1 TENDON SHEATH OR LIGAMENT Right 12/16/2022    Procedure: INJECTION,TENDON SHEATH OR LIGAMENT,1 TENDON SHEATH OR LIGAMENT;  Surgeon: Montana Russ Jr., MD;  Location: [facility] OR;  Service: Orthopedics;  Laterality: Right;    KNEE SURGERY      x 5    LAPAROSCOPIC APPENDECTOMY N/A 2/13/2023    Procedure: APPENDECTOMY, LAPAROSCOPIC;  Surgeon: Babs Cano MD;  Location: [facility] OR;  Service: General;  Laterality: N/A;    REPAIR, HERNIA, UMBILICAL  2/13/2023    Procedure: REPAIR, HERNIA, UMBILICAL;  Surgeon: Babs Cano MD;  Location: [facility] OR;  Service: General;;    VASECTOMY         OBJECTIVE:   PHYSICAL EXAM:  Height: 6' (182.9 cm) Weight: 128.4 kg (283 lb 1.1 oz)  Vitals:    04/19/23 1403   Weight: 128.4 kg (283 lb 1.1 oz)   Height: 6' (1.829 m)   PainSc:   3   PainLoc: Shoulder       Ortho/SPM  Exam  Examination left shoulder   Surgical incisions are well healed with appropriate scar formation   AROM: Forward flexion to 120°, external rotation to 45°, abduction to 90°  Motor strength: 5/5 to supraspinatus testing compared to contralateral extremity.  5/5 biceps testing  Sensation grossly intact distally  NTTP to soft tissues and bony prominences throughout shoulder girdle  Brisk cap refill to finger pads  Radial pulse palpable    RADIOGRAPHS:  No new imaging obtained today    ASSESSMENT/PLAN:     IMPRESSION:  Status post rotator cuff repair left shoulder    PLAN:    The patient was encouraged continue range of motion and strengthening exercises independently as he moves forward.  I advised against heavy overhead strengthening.    The intermittent brief periods of pain he feels during external rotation should continue to subside with time.  He was advised to keep an eye on it as he returns to work.    Work release:  Returned to work without restrictions was provided today    FOLLOW UP: 2 mos

## 2023-04-19 ENCOUNTER — OFFICE VISIT (OUTPATIENT)
Dept: ORTHOPEDICS | Facility: CLINIC | Age: 53
End: 2023-04-19
Payer: COMMERCIAL

## 2023-04-19 VITALS — BODY MASS INDEX: 38.34 KG/M2 | HEIGHT: 72 IN | WEIGHT: 283.06 LBS

## 2023-04-19 DIAGNOSIS — Z98.890 S/P ROTATOR CUFF REPAIR: Primary | ICD-10-CM

## 2023-04-19 PROCEDURE — 1160F PR REVIEW ALL MEDS BY PRESCRIBER/CLIN PHARMACIST DOCUMENTED: ICD-10-PCS | Mod: CPTII,S$GLB,, | Performed by: PHYSICIAN ASSISTANT

## 2023-04-19 PROCEDURE — 1159F MED LIST DOCD IN RCRD: CPT | Mod: CPTII,S$GLB,, | Performed by: PHYSICIAN ASSISTANT

## 2023-04-19 PROCEDURE — 99999 PR PBB SHADOW E&M-EST. PATIENT-LVL III: ICD-10-PCS | Mod: PBBFAC,,, | Performed by: PHYSICIAN ASSISTANT

## 2023-04-19 PROCEDURE — 3008F BODY MASS INDEX DOCD: CPT | Mod: CPTII,S$GLB,, | Performed by: PHYSICIAN ASSISTANT

## 2023-04-19 PROCEDURE — 1160F RVW MEDS BY RX/DR IN RCRD: CPT | Mod: CPTII,S$GLB,, | Performed by: PHYSICIAN ASSISTANT

## 2023-04-19 PROCEDURE — 99999 PR PBB SHADOW E&M-EST. PATIENT-LVL III: CPT | Mod: PBBFAC,,, | Performed by: PHYSICIAN ASSISTANT

## 2023-04-19 PROCEDURE — 3008F PR BODY MASS INDEX (BMI) DOCUMENTED: ICD-10-PCS | Mod: CPTII,S$GLB,, | Performed by: PHYSICIAN ASSISTANT

## 2023-04-19 PROCEDURE — 99213 PR OFFICE/OUTPT VISIT, EST, LEVL III, 20-29 MIN: ICD-10-PCS | Mod: S$GLB,,, | Performed by: PHYSICIAN ASSISTANT

## 2023-04-19 PROCEDURE — 99213 OFFICE O/P EST LOW 20 MIN: CPT | Mod: S$GLB,,, | Performed by: PHYSICIAN ASSISTANT

## 2023-04-19 PROCEDURE — 1159F PR MEDICATION LIST DOCUMENTED IN MEDICAL RECORD: ICD-10-PCS | Mod: CPTII,S$GLB,, | Performed by: PHYSICIAN ASSISTANT

## 2023-04-20 ENCOUNTER — PATIENT MESSAGE (OUTPATIENT)
Dept: ORTHOPEDICS | Facility: CLINIC | Age: 53
End: 2023-04-20
Payer: COMMERCIAL

## 2023-06-13 ENCOUNTER — OFFICE VISIT (OUTPATIENT)
Dept: SPORTS MEDICINE | Facility: CLINIC | Age: 53
End: 2023-06-13
Payer: COMMERCIAL

## 2023-06-13 VITALS
BODY MASS INDEX: 37.25 KG/M2 | WEIGHT: 275 LBS | DIASTOLIC BLOOD PRESSURE: 94 MMHG | HEART RATE: 87 BPM | HEIGHT: 72 IN | SYSTOLIC BLOOD PRESSURE: 138 MMHG

## 2023-06-13 DIAGNOSIS — G89.29 CHRONIC PAIN OF LEFT KNEE: ICD-10-CM

## 2023-06-13 DIAGNOSIS — M25.562 CHRONIC PAIN OF LEFT KNEE: ICD-10-CM

## 2023-06-13 DIAGNOSIS — Z98.890 S/P ACL RECONSTRUCTION: ICD-10-CM

## 2023-06-13 DIAGNOSIS — M17.12 PRIMARY OSTEOARTHRITIS OF LEFT KNEE: Primary | ICD-10-CM

## 2023-06-13 PROCEDURE — 99999 PR PBB SHADOW E&M-EST. PATIENT-LVL III: ICD-10-PCS | Mod: PBBFAC,,, | Performed by: ORTHOPAEDIC SURGERY

## 2023-06-13 PROCEDURE — 3080F DIAST BP >= 90 MM HG: CPT | Mod: CPTII,S$GLB,, | Performed by: ORTHOPAEDIC SURGERY

## 2023-06-13 PROCEDURE — 99214 OFFICE O/P EST MOD 30 MIN: CPT | Mod: 25,S$GLB,, | Performed by: ORTHOPAEDIC SURGERY

## 2023-06-13 PROCEDURE — 3075F PR MOST RECENT SYSTOLIC BLOOD PRESS GE 130-139MM HG: ICD-10-PCS | Mod: CPTII,S$GLB,, | Performed by: ORTHOPAEDIC SURGERY

## 2023-06-13 PROCEDURE — 99999 PR PBB SHADOW E&M-EST. PATIENT-LVL III: CPT | Mod: PBBFAC,,, | Performed by: ORTHOPAEDIC SURGERY

## 2023-06-13 PROCEDURE — 3008F BODY MASS INDEX DOCD: CPT | Mod: CPTII,S$GLB,, | Performed by: ORTHOPAEDIC SURGERY

## 2023-06-13 PROCEDURE — 1159F MED LIST DOCD IN RCRD: CPT | Mod: CPTII,S$GLB,, | Performed by: ORTHOPAEDIC SURGERY

## 2023-06-13 PROCEDURE — 1159F PR MEDICATION LIST DOCUMENTED IN MEDICAL RECORD: ICD-10-PCS | Mod: CPTII,S$GLB,, | Performed by: ORTHOPAEDIC SURGERY

## 2023-06-13 PROCEDURE — 3075F SYST BP GE 130 - 139MM HG: CPT | Mod: CPTII,S$GLB,, | Performed by: ORTHOPAEDIC SURGERY

## 2023-06-13 PROCEDURE — 20610 DRAIN/INJ JOINT/BURSA W/O US: CPT | Mod: LT,S$GLB,, | Performed by: ORTHOPAEDIC SURGERY

## 2023-06-13 PROCEDURE — 20610 LARGE JOINT ASPIRATION/INJECTION: L KNEE: ICD-10-PCS | Mod: LT,S$GLB,, | Performed by: ORTHOPAEDIC SURGERY

## 2023-06-13 PROCEDURE — 3080F PR MOST RECENT DIASTOLIC BLOOD PRESSURE >= 90 MM HG: ICD-10-PCS | Mod: CPTII,S$GLB,, | Performed by: ORTHOPAEDIC SURGERY

## 2023-06-13 PROCEDURE — 3008F PR BODY MASS INDEX (BMI) DOCUMENTED: ICD-10-PCS | Mod: CPTII,S$GLB,, | Performed by: ORTHOPAEDIC SURGERY

## 2023-06-13 PROCEDURE — 99214 PR OFFICE/OUTPT VISIT, EST, LEVL IV, 30-39 MIN: ICD-10-PCS | Mod: 25,S$GLB,, | Performed by: ORTHOPAEDIC SURGERY

## 2023-06-13 RX ORDER — TRIAMCINOLONE ACETONIDE 40 MG/ML
80 INJECTION, SUSPENSION INTRA-ARTICULAR; INTRAMUSCULAR
Status: DISCONTINUED | OUTPATIENT
Start: 2023-06-13 | End: 2023-06-13 | Stop reason: HOSPADM

## 2023-06-13 RX ADMIN — TRIAMCINOLONE ACETONIDE 80 MG: 40 INJECTION, SUSPENSION INTRA-ARTICULAR; INTRAMUSCULAR at 02:06

## 2023-06-13 NOTE — PROGRESS NOTES
CC: Left knee pain    Patient returns to clinic for follow up of left knee. See history below. Patient has tried and failed a CSI and zilretta injection. He reports minimal to no relief from either. Patient reports medial joint line pain.     Interval Hx (Monico Ramos PA-C):   Patient is a 53-year-old male who presents today for follow-up evaluation of left knee pain.  He had his knee aspirated followed by a corticosteroid injection at his last visit which only provided relief for approximately 3-4 weeks.  His knee has been swollen for the past week or 2.  He denies any new injury.  He has continued anti-inflammatory medication, rice therapy, and regular exercise programs with little relief.  He has also tried corticosteroid and viscosupplementation injections which have failed.  He is interested in other injection options to prolonged knee replacement surgery for the next couple of years if possible.  He would like to have his knee drained again today.      PreviousHPI (2/9/2023):  Patient is a 53-year-old male who presents today for follow-up evaluation of left knee pain.  He has a history of left knee arthroscopy with Dr. Kruse in 2014 as well as prior ACL/PCL reconstruction many years ago.  Patient reports that he began experiencing acute onset left knee pain with associated swelling 3-4 days ago.  He is not recall any recent fall, injury, or trauma to the left knee.  Pain is described as a dull ache that is worse with bending the knee, weight-bearing, and going up and down stairs.  He denies any associated numbness or tingling of the left lower extremity.  There is no associated subjective instability or mechanical grinding/catching.  He has tried corticosteroid injections and viscosupplementation injections in the past.  He has been taking over-the-counter anti-inflammatory medication which does provide mild relief.    Mechanical symptoms: catching  Subjective instability: (--)   Worse with knee flexion  and squats  Better with rest.   Nocturnal symptoms: (+)    No previous surgeries or trauma on right knee      DATE OF PROCEDURE: 12/31/2014     PREOPERATIVE DIAGNOSES:   1. Left knee medial meniscus tear.   2. Left knee lateral meniscus tear     POSTOPERATIVE DIAGNOSES:   1. Left knee medial meniscus tear.   2. Left knee lateral meniscus tear  3. Left knee loose body     PROCEDURES:   1. Left knee arthroscopic partial medial and lateral meniscectomies  2. Left knee arthroscopic loose body removal     SURGEON: Sergey Kruse M.D.     REVIEW OF SYSTEMS:   Constitution: Negative. Negative for chills, fever and night sweats.   HENT: Negative for congestion and headaches.    Eyes: Negative for blurred vision, left vision loss and right vision loss.   Cardiovascular: Negative for chest pain and syncope.   Respiratory: Negative for cough and shortness of breath.    Endocrine: Negative for polydipsia, polyphagia and polyuria.   Hematologic/Lymphatic: Negative for bleeding problem. Does not bruise/bleed easily.   Skin: Negative for dry skin, itching and rash.   Musculoskeletal: Negative for falls. Positive for left knee pain and  muscle weakness.   Gastrointestinal: Negative for abdominal pain and bowel incontinence.   Genitourinary: Negative for bladder incontinence and nocturia.   Neurological: Negative for disturbances in coordination, loss of balance and seizures.   Psychiatric/Behavioral: Negative for depression. The patient does not have insomnia.    Allergic/Immunologic: Negative for hives and persistent infections.     PAST MEDICAL HISTORY:   Past Medical History:   Diagnosis Date    Allergy     Back pain     GERD (gastroesophageal reflux disease)        PAST SURGICAL HISTORY:   Past Surgical History:   Procedure Laterality Date    ANKLE SURGERY Left     ARTHROSCOPIC REPAIR OF ROTATOR CUFF OF SHOULDER Left 12/16/2022    Procedure: REPAIR, ROTATOR CUFF, ARTHROSCOPIC;  Surgeon: Montana Russ Jr., MD;  Location:  [facility] OR;  Service: Orthopedics;  Laterality: Left;  need [facility] notified cc    ARTHROSCOPY OF SHOULDER WITH DECOMPRESSION OF SUBACROMIAL SPACE Left 12/16/2022    Procedure: ARTHROSCOPY, SHOULDER, WITH SUBACROMIAL SPACE DECOMPRESSION;  Surgeon: Montana Russ Jr., MD;  Location: [facility] OR;  Service: Orthopedics;  Laterality: Left;    BACK SURGERY  01/30/2017    INJECTION, TENDON SHEATH OR LIGAMENT, 1 TENDON SHEATH OR LIGAMENT Right 12/16/2022    Procedure: INJECTION,TENDON SHEATH OR LIGAMENT,1 TENDON SHEATH OR LIGAMENT;  Surgeon: Montana Russ Jr., MD;  Location: [facility] OR;  Service: Orthopedics;  Laterality: Right;    KNEE SURGERY      x 5    LAPAROSCOPIC APPENDECTOMY N/A 2/13/2023    Procedure: APPENDECTOMY, LAPAROSCOPIC;  Surgeon: Babs Cano MD;  Location: [facility];  Service: General;  Laterality: N/A;    REPAIR, HERNIA, UMBILICAL  2/13/2023    Procedure: REPAIR, HERNIA, UMBILICAL;  Surgeon: Babs Cano MD;  Location: [facility];  Service: General;;    VASECTOMY         FAMILY HISTORY:   Family History   Problem Relation Age of Onset    Prostate cancer Father     Kidney cancer Neg Hx        SOCIAL HISTORY:   Social History     Socioeconomic History    Marital status:    Tobacco Use    Smoking status: Never    Smokeless tobacco: Never   Substance and Sexual Activity    Alcohol use: Yes     Comment: occasional    Drug use: No    Sexual activity: Yes       MEDICATIONS:     Current Outpatient Medications:     gabapentin (NEURONTIN) 100 MG capsule, Take 100 mg by mouth 3 (three) times daily., Disp: , Rfl:     diazePAM (VALIUM) 5 MG tablet, Take 1 tablet (5 mg total) by mouth every 6 (six) hours as needed for Anxiety., Disp: 4 tablet, Rfl: 0    famotidine (PEPCID) 20 MG tablet, Take 1 tablet (20 mg total) by mouth 2 (two) times daily. (Patient not taking: Reported on 3/13/2023), Disp: 60 tablet, Rfl: 0    pep injection, Inject .25 ml as directed   For compounding pharmacy use:  Add  PAPAVERINE 30 mcg Add PHENTOLAMINE 10 mg Add ALPROSTADIL 100 mcg, Disp: 2 vial, Rfl: 11    tamsulosin (FLOMAX) 0.4 mg Cap, Take 1 capsule (0.4 mg total) by mouth once daily., Disp: 30 capsule, Rfl: 11    testosterone cypionate (DEPOTESTOTERONE CYPIONATE) 200 mg/mL injection, Inject 1 mL (200 mg total) into the muscle every 14 (fourteen) days., Disp: 10 mL, Rfl: 1  No current facility-administered medications for this visit.    Facility-Administered Medications Ordered in Other Visits:     triamcinolone acetonide injection 40 mg, 40 mg, Intra-articular, , Sergey Kruse MD, 40 mg at 11/11/14 1327    ALLERGIES:   Review of patient's allergies indicates:   Allergen Reactions    Sulfa (sulfonamide antibiotics) Swelling    Neosporin (neomycin-polymyx) Rash     Topical irritation     Aspirin Swelling    Latex, natural rubber Hives and Itching    Shellfish containing products Swelling       VITAL SIGNS:   BP (!) 138/94   Pulse 87   Ht 6' (1.829 m)   Wt 124.7 kg (275 lb)   BMI 37.30 kg/m²      PHYSICAL EXAMINATION:  General:  The patient is alert and oriented x 3.  Mood is pleasant.  Observation of ears, eyes and nose reveal no gross abnormalities.  No labored breathing observed.    LEFT KNEE EXAMINATION     OBSERVATION / INSPECTION   Gait:   antalgic   Alignment:  Neutral   Scars:   None   Muscle atrophy: Mild  Effusion:  2+   Warmth:  None   Discoloration:   none     TENDERNESS / CREPITUS (T / C):          T / C      T / C   Patella   - / -   Lateral joint line   - / -   Peripatellar medial  -  Medial joint line    + / -   Peripatellar lateral +  Medial plica   - / -   Patellar tendon -   Popliteal fossa   - / -   Quad tendon   -   Gastrocnemius   -   Prepatellar Bursa - / -   Quadricep   -   Tibial tubercle  -  Thigh/hamstring  -   Pes anserine/HS -  Fibula    -   ITB   - / -  Tibia     -   Tib/fib joint  - / -  LCL    -     MFC   - / -   MCL: Proximal  -    LFC   - / -    Distal   -          ROM: (* =  pain)  PASSIVE   ACTIVE    Left :   *5 / *5 / *110   *5 / *5 / *110     Right :    5 / 0 / 130   5 / 0 / 125    Patellofemoral examination:  See above noted areas of tenderness.   Patella position    Subluxation / dislocation: Centered           Sup. / Inf;   Normal   Crepitus (PF):    Absent   Patellar Mobility:       Medial-lateral:   Normal    Superior-inferior:  Normal    Inferior tilt   Normal    Patellar tendon:  Normal   Lateral tilt:    Normal   J-sign:     None   Patellofemoral grind:   + pain       MENISCAL SIGNS:     Pain on terminal extension:  +  Pain on terminal flexion:  +  Jamess maneuver:  + (for medial)  Squat     + (for medial and lateral)    LIGAMENT EXAMINATION:  ACL / Lachman:  normal (-1 to 2mm)    PCL-Post.  drawer: normal 0 to 2mm  MCL- Valgus:  normal 0 to 2mm  LCL- Varus:  normal 0 to 2mm  Pivot shift: normal (Equal)   Dial Test: difference c/w other side   At 30° flexion: normal (< 5°)    At 90° flexion: normal (< 5°)   Reverse Pivot Shift:   normal (Equal)     STRENGTH: (* = with pain) PAINFUL SIDE   Quadricep   *4/5   Hamstrin/5    EXTREMITY NEURO-VASCULAR EXAMINATION:   Sensation:  Grossly intact to light touch all dermatomal regions.   Motor Function:  Fully intact motor function at hip, knee, foot and ankle    DTRs;  quadriceps and  achilles 2+.  No clonus and downgoing Babinski.    Vascular status:  DP and PT pulses 2+, brisk capillary refill, symmetric.     OTHER FINDINGS:  Pain with SLR    Radiographic Findings 2021:    Right: No fracture dislocation bone destruction seen.     Left: There is ACL repair.  There is moderate DJD and a varus deformity.  No fracture dislocation bone destruction or OCD seen.    Xrays of the knees were ordered and reviewed by me today. These findings were discussed and reviewed with the patient.    X-rays left knee (2023):  There is bilateral medial compartmental narrowing, left greater than right.  Surgical changes are noted of  left knee cruciate ligament repair.  There is prominent osteophytosis arising from the medial femoral condyle and adjacent medial tibial plateau on the left.  No acute displaced fracture or dislocation of the left or right knee.  No large knee joint effusion.  No radiopaque foreign body.    ASSESSMENT:      ICD-10-CM ICD-9-CM   1. Primary osteoarthritis of left knee  M17.12 715.16   2. S/P ACL reconstruction  Z98.890 V45.89   3. Chronic pain of left knee  M25.562 719.46    G89.29 338.29         PLAN:     CSI left shoulder (3:2)  2. F/u PRN         All of the patient's questions were answered and the patient will contact us if they have any questions or concerns in the interim.

## 2023-06-13 NOTE — PROCEDURES
Large Joint Aspiration/Injection: L knee    Date/Time: 6/13/2023 2:15 PM  Performed by: Sergey Kruse MD  Authorized by: Sergey Kruse MD     Consent Done?:  Yes (Verbal)  Indications:  Pain  Site marked: the procedure site was marked    Timeout: prior to procedure the correct patient, procedure, and site was verified    Prep: patient was prepped and draped in usual sterile fashion      Details:  Needle Size:  22 G  Ultrasonic Guidance for needle placement?: No    Approach:  Superior  Location:  Knee  Site:  L knee  Medications:  80 mg triamcinolone acetonide 40 mg/mL  Patient tolerance:  Patient tolerated the procedure well with no immediate complications

## 2023-06-27 ENCOUNTER — PATIENT OUTREACH (OUTPATIENT)
Dept: ADMINISTRATIVE | Facility: HOSPITAL | Age: 53
End: 2023-06-27
Payer: COMMERCIAL

## 2023-08-24 ENCOUNTER — OFFICE VISIT (OUTPATIENT)
Dept: SPORTS MEDICINE | Facility: CLINIC | Age: 53
End: 2023-08-24
Payer: COMMERCIAL

## 2023-08-24 VITALS
DIASTOLIC BLOOD PRESSURE: 87 MMHG | SYSTOLIC BLOOD PRESSURE: 138 MMHG | HEART RATE: 63 BPM | WEIGHT: 264.56 LBS | HEIGHT: 72 IN | BODY MASS INDEX: 35.83 KG/M2

## 2023-08-24 DIAGNOSIS — Z98.890 S/P KNEE SURGERY: ICD-10-CM

## 2023-08-24 DIAGNOSIS — M25.562 CHRONIC PAIN OF LEFT KNEE: ICD-10-CM

## 2023-08-24 DIAGNOSIS — G89.29 CHRONIC PAIN OF LEFT KNEE: ICD-10-CM

## 2023-08-24 DIAGNOSIS — Z98.890 S/P ACL RECONSTRUCTION: ICD-10-CM

## 2023-08-24 DIAGNOSIS — M17.32 POST-TRAUMATIC OSTEOARTHRITIS OF LEFT KNEE: Primary | ICD-10-CM

## 2023-08-24 PROCEDURE — 99999 PR PBB SHADOW E&M-EST. PATIENT-LVL III: ICD-10-PCS | Mod: PBBFAC,,, | Performed by: ORTHOPAEDIC SURGERY

## 2023-08-24 PROCEDURE — 3008F PR BODY MASS INDEX (BMI) DOCUMENTED: ICD-10-PCS | Mod: CPTII,S$GLB,, | Performed by: ORTHOPAEDIC SURGERY

## 2023-08-24 PROCEDURE — 3079F PR MOST RECENT DIASTOLIC BLOOD PRESSURE 80-89 MM HG: ICD-10-PCS | Mod: CPTII,S$GLB,, | Performed by: ORTHOPAEDIC SURGERY

## 2023-08-24 PROCEDURE — 3079F DIAST BP 80-89 MM HG: CPT | Mod: CPTII,S$GLB,, | Performed by: ORTHOPAEDIC SURGERY

## 2023-08-24 PROCEDURE — 3075F SYST BP GE 130 - 139MM HG: CPT | Mod: CPTII,S$GLB,, | Performed by: ORTHOPAEDIC SURGERY

## 2023-08-24 PROCEDURE — 3008F BODY MASS INDEX DOCD: CPT | Mod: CPTII,S$GLB,, | Performed by: ORTHOPAEDIC SURGERY

## 2023-08-24 PROCEDURE — 99999 PR PBB SHADOW E&M-EST. PATIENT-LVL III: CPT | Mod: PBBFAC,,, | Performed by: ORTHOPAEDIC SURGERY

## 2023-08-24 PROCEDURE — 3075F PR MOST RECENT SYSTOLIC BLOOD PRESS GE 130-139MM HG: ICD-10-PCS | Mod: CPTII,S$GLB,, | Performed by: ORTHOPAEDIC SURGERY

## 2023-08-24 PROCEDURE — 1159F MED LIST DOCD IN RCRD: CPT | Mod: CPTII,S$GLB,, | Performed by: ORTHOPAEDIC SURGERY

## 2023-08-24 PROCEDURE — 99214 PR OFFICE/OUTPT VISIT, EST, LEVL IV, 30-39 MIN: ICD-10-PCS | Mod: S$GLB,,, | Performed by: ORTHOPAEDIC SURGERY

## 2023-08-24 PROCEDURE — 99214 OFFICE O/P EST MOD 30 MIN: CPT | Mod: S$GLB,,, | Performed by: ORTHOPAEDIC SURGERY

## 2023-08-24 PROCEDURE — 1159F PR MEDICATION LIST DOCUMENTED IN MEDICAL RECORD: ICD-10-PCS | Mod: CPTII,S$GLB,, | Performed by: ORTHOPAEDIC SURGERY

## 2023-08-24 RX ORDER — MELOXICAM 15 MG/1
15 TABLET ORAL DAILY PRN
COMMUNITY
Start: 2023-07-26 | End: 2023-12-12 | Stop reason: SDUPTHER

## 2023-08-24 NOTE — PROGRESS NOTES
CC: Left knee pain    Patient returns to clinic for follow up of left knee. See history below. Patient reports medial joint line pain is most significant. Patient has tried and failed a CSI and zilretta injection, medial  brace, PT, OTC pain meds, ice/heat, activity modification. He reports minimal to no relief from any intervention. Significantly limits his ADLs, reports he was unable to walk around with his family at recent beach trip. 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 Last CSI was June 2023.     Interval Hx (Monico Ramos PA-C):   Patient is a 53-year-old male who presents today for follow-up evaluation of left knee pain.  He had his knee aspirated followed by a corticosteroid injection at his last visit which only provided relief for approximately 3-4 weeks.  His knee has been swollen for the past week or 2.  He denies any new injury.  He has continued anti-inflammatory medication, rice therapy, and regular exercise programs with little relief.  He has also tried corticosteroid and viscosupplementation injections which have failed.  He is interested in other injection options to prolonged knee replacement surgery for the next couple of years if possible.  He would like to have his knee drained again today.      PreviousHPI (2/9/2023):  Patient is a 53-year-old male who presents today for follow-up evaluation of left knee pain.  He has a history of left knee arthroscopy with Dr. Kruse in 2014 as well as prior ACL/PCL reconstruction many years ago.  Patient reports that he began experiencing acute onset left knee pain with associated swelling 3-4 days ago.  He is not recall any recent fall, injury, or trauma to the left knee.  Pain is described as a dull ache that is worse with bending the knee, weight-bearing, and going up and down stairs.  He denies any associated numbness or tingling of the left lower extremity.  There is no associated subjective instability or mechanical  grinding/catching.  He has tried corticosteroid injections and viscosupplementation injections in the past.  He has been taking over-the-counter anti-inflammatory medication which does provide mild relief.    Mechanical symptoms: catching  Subjective instability: (--)   Worse with knee flexion and squats  Better with rest.   Nocturnal symptoms: (+)    No previous surgeries or trauma on right knee      DATE OF PROCEDURE: 12/31/2014     PREOPERATIVE DIAGNOSES:   1. Left knee medial meniscus tear.   2. Left knee lateral meniscus tear     POSTOPERATIVE DIAGNOSES:   1. Left knee medial meniscus tear.   2. Left knee lateral meniscus tear  3. Left knee loose body     PROCEDURES:   1. Left knee arthroscopic partial medial and lateral meniscectomies  2. Left knee arthroscopic loose body removal     SURGEON: Sergey Kruse M.D.     REVIEW OF SYSTEMS:   Constitution: Negative. Negative for chills, fever and night sweats.   HENT: Negative for congestion and headaches.    Eyes: Negative for blurred vision, left vision loss and right vision loss.   Cardiovascular: Negative for chest pain and syncope.   Respiratory: Negative for cough and shortness of breath.    Endocrine: Negative for polydipsia, polyphagia and polyuria.   Hematologic/Lymphatic: Negative for bleeding problem. Does not bruise/bleed easily.   Skin: Negative for dry skin, itching and rash.   Musculoskeletal: Negative for falls. Positive for left knee pain and  muscle weakness.   Gastrointestinal: Negative for abdominal pain and bowel incontinence.   Genitourinary: Negative for bladder incontinence and nocturia.   Neurological: Negative for disturbances in coordination, loss of balance and seizures.   Psychiatric/Behavioral: Negative for depression. The patient does not have insomnia.    Allergic/Immunologic: Negative for hives and persistent infections.     PAST MEDICAL HISTORY:   Past Medical History:   Diagnosis Date    Allergy     Back pain     GERD  (gastroesophageal reflux disease)        PAST SURGICAL HISTORY:   Past Surgical History:   Procedure Laterality Date    ANKLE SURGERY Left     ARTHROSCOPIC REPAIR OF ROTATOR CUFF OF SHOULDER Left 12/16/2022    Procedure: REPAIR, ROTATOR CUFF, ARTHROSCOPIC;  Surgeon: Montana Russ Jr., MD;  Location: [facility];  Service: Orthopedics;  Laterality: Left;  need [facility] notified cc    ARTHROSCOPY OF SHOULDER WITH DECOMPRESSION OF SUBACROMIAL SPACE Left 12/16/2022    Procedure: ARTHROSCOPY, SHOULDER, WITH SUBACROMIAL SPACE DECOMPRESSION;  Surgeon: Montana Russ Jr., MD;  Location: [facility] OR;  Service: Orthopedics;  Laterality: Left;    BACK SURGERY  01/30/2017    INJECTION, TENDON SHEATH OR LIGAMENT, 1 TENDON SHEATH OR LIGAMENT Right 12/16/2022    Procedure: INJECTION,TENDON SHEATH OR LIGAMENT,1 TENDON SHEATH OR LIGAMENT;  Surgeon: Montana Russ Jr., MD;  Location: [facility];  Service: Orthopedics;  Laterality: Right;    KNEE SURGERY      x 5    LAPAROSCOPIC APPENDECTOMY N/A 2/13/2023    Procedure: APPENDECTOMY, LAPAROSCOPIC;  Surgeon: Babs Cano MD;  Location: [facility] OR;  Service: General;  Laterality: N/A;    REPAIR, HERNIA, UMBILICAL  2/13/2023    Procedure: REPAIR, HERNIA, UMBILICAL;  Surgeon: Babs Cano MD;  Location: [facility];  Service: General;;    VASECTOMY         FAMILY HISTORY:   Family History   Problem Relation Age of Onset    Prostate cancer Father     Kidney cancer Neg Hx        SOCIAL HISTORY:   Social History     Socioeconomic History    Marital status:    Tobacco Use    Smoking status: Never    Smokeless tobacco: Never   Substance and Sexual Activity    Alcohol use: Yes     Comment: occasional    Drug use: No    Sexual activity: Yes       MEDICATIONS:     Current Outpatient Medications:     meloxicam (MOBIC) 15 MG tablet, Take 15 mg by mouth daily as needed., Disp: , Rfl:     diazePAM (VALIUM) 5 MG tablet, Take 1 tablet (5 mg total) by mouth every 6 (six) hours as  needed for Anxiety., Disp: 4 tablet, Rfl: 0    famotidine (PEPCID) 20 MG tablet, Take 1 tablet (20 mg total) by mouth 2 (two) times daily. (Patient not taking: Reported on 8/24/2023), Disp: 60 tablet, Rfl: 0    gabapentin (NEURONTIN) 100 MG capsule, Take 100 mg by mouth 3 (three) times daily., Disp: , Rfl:     pep injection, Inject .25 ml as directed   For compounding pharmacy use:  Add PAPAVERINE 30 mcg Add PHENTOLAMINE 10 mg Add ALPROSTADIL 100 mcg (Patient not taking: Reported on 8/24/2023), Disp: 2 vial, Rfl: 11    tamsulosin (FLOMAX) 0.4 mg Cap, Take 1 capsule (0.4 mg total) by mouth once daily. (Patient taking differently: Take 0.4 mg by mouth once daily. Pt still taking), Disp: 30 capsule, Rfl: 11    testosterone cypionate (DEPOTESTOTERONE CYPIONATE) 200 mg/mL injection, Inject 1 mL (200 mg total) into the muscle every 14 (fourteen) days., Disp: 10 mL, Rfl: 1  No current facility-administered medications for this visit.    Facility-Administered Medications Ordered in Other Visits:     triamcinolone acetonide injection 40 mg, 40 mg, Intra-articular, , Sergey Kruse MD, 40 mg at 11/11/14 1327    ALLERGIES:   Review of patient's allergies indicates:   Allergen Reactions    Sulfa (sulfonamide antibiotics) Swelling    Neosporin (neomycin-polymyx) Rash     Topical irritation     Aspirin Swelling    Latex, natural rubber Hives and Itching    Shellfish containing products Swelling       VITAL SIGNS:   /87   Pulse 63   Ht 6' (1.829 m)   Wt 120 kg (264 lb 8.8 oz)   BMI 35.88 kg/m²      PHYSICAL EXAMINATION:  General:  The patient is alert and oriented x 3.  Mood is pleasant.  Observation of ears, eyes and nose reveal no gross abnormalities.  No labored breathing observed.    LEFT KNEE EXAMINATION     OBSERVATION / INSPECTION   Gait:   antalgic   Alignment:  Neutral   Scars:   None   Muscle atrophy: Mild  Effusion:  2+   Warmth:  None   Discoloration:   none     TENDERNESS / CREPITUS (T / C):           T / C      T / C   Patella   - / -   Lateral joint line   - / -   Peripatellar medial  -  Medial joint line    + / -   Peripatellar lateral +  Medial plica   - / -   Patellar tendon -   Popliteal fossa   - / -   Quad tendon   -   Gastrocnemius   -   Prepatellar Bursa - / -   Quadricep   -   Tibial tubercle  -  Thigh/hamstring  -   Pes anserine/HS -  Fibula    -   ITB   - / -  Tibia     -   Tib/fib joint  - / -  LCL    -     MFC   - / -   MCL: Proximal  -    LFC   - / -    Distal   -          ROM: (* = pain)  PASSIVE   ACTIVE    Left :   *5 / *5 / *110   *5 / *5 / *110     Right :    5 / 0 / 130   5 / 0 / 125    Patellofemoral examination:  See above noted areas of tenderness.   Patella position    Subluxation / dislocation: Centered           Sup. / Inf;   Normal   Crepitus (PF):    Absent   Patellar Mobility:       Medial-lateral:   Normal    Superior-inferior:  Normal    Inferior tilt   Normal    Patellar tendon:  Normal   Lateral tilt:    Normal   J-sign:     None   Patellofemoral grind:   + pain       MENISCAL SIGNS:     Pain on terminal extension:  +  Pain on terminal flexion:  +  Jamess maneuver:  + (for medial)  Squat     + (for medial and lateral)    LIGAMENT EXAMINATION:  ACL / Lachman:  normal (-1 to 2mm)    PCL-Post.  drawer: normal 0 to 2mm  MCL- Valgus:  normal 0 to 2mm  LCL- Varus:  normal 0 to 2mm  Pivot shift: normal (Equal)   Dial Test: difference c/w other side   At 30° flexion: normal (< 5°)    At 90° flexion: normal (< 5°)   Reverse Pivot Shift:   normal (Equal)     STRENGTH: (* = with pain) PAINFUL SIDE   Quadricep   *4/5   Hamstrin/5    EXTREMITY NEURO-VASCULAR EXAMINATION:   Sensation:  Grossly intact to light touch all dermatomal regions.   Motor Function:  Fully intact motor function at hip, knee, foot and ankle    DTRs;  quadriceps and  achilles 2+.  No clonus and downgoing Babinski.    Vascular status:  DP and PT pulses 2+, brisk capillary refill, symmetric.     OTHER  FINDINGS:  Pain with SLR    Radiographic Findings 2/12/2021:    Right: No fracture dislocation bone destruction seen.     Left: There is ACL repair.  There is moderate DJD and a varus deformity.  No fracture dislocation bone destruction or OCD seen.    Xrays of the knees were ordered and reviewed by me today. These findings were discussed and reviewed with the patient.    X-rays left knee (2/9/2023):  There is bilateral medial compartmental narrowing, left greater than right.  Surgical changes are noted of left knee cruciate ligament repair.  There is prominent osteophytosis arising from the medial femoral condyle and adjacent medial tibial plateau on the left.  No acute displaced fracture or dislocation of the left or right knee.  No large knee joint effusion.  No radiopaque foreign body.    ASSESSMENT:      ICD-10-CM ICD-9-CM   1. Post-traumatic osteoarthritis of left knee  M17.32 715.26   2. S/P ACL reconstruction  Z98.890 V45.89   3. Chronic pain of left knee  M25.562 719.46    G89.29 338.29   4. S/P L knee surgery, arthroscopic medial/lateral menisectomy, loose body removal (12/31/14)  Z98.890 V45.89           PLAN:   At this point patient has failed maximum conservative intervention, is ready to proceed with discussion about possible arthroplasty.     -We will refer him to Dr. Jimenez's clinic    -Referral to [facility] for possible Iovera    -Patient would like [facility] to help with symptom management    All of the patient's questions were answered and the patient will contact us if they have any questions or concerns in the interim.

## 2023-12-12 ENCOUNTER — TELEPHONE (OUTPATIENT)
Dept: SPORTS MEDICINE | Facility: CLINIC | Age: 53
End: 2023-12-12
Payer: COMMERCIAL

## 2023-12-12 ENCOUNTER — OFFICE VISIT (OUTPATIENT)
Dept: SPORTS MEDICINE | Facility: CLINIC | Age: 53
End: 2023-12-12
Payer: COMMERCIAL

## 2023-12-12 VITALS
WEIGHT: 268 LBS | HEIGHT: 72 IN | BODY MASS INDEX: 36.3 KG/M2 | HEART RATE: 102 BPM | SYSTOLIC BLOOD PRESSURE: 153 MMHG | DIASTOLIC BLOOD PRESSURE: 87 MMHG

## 2023-12-12 DIAGNOSIS — G89.29 CHRONIC PAIN OF LEFT KNEE: ICD-10-CM

## 2023-12-12 DIAGNOSIS — M17.32 POST-TRAUMATIC OSTEOARTHRITIS OF LEFT KNEE: Primary | ICD-10-CM

## 2023-12-12 DIAGNOSIS — M25.462 EFFUSION OF LEFT KNEE JOINT: ICD-10-CM

## 2023-12-12 DIAGNOSIS — M25.562 CHRONIC PAIN OF LEFT KNEE: ICD-10-CM

## 2023-12-12 PROCEDURE — 4010F ACE/ARB THERAPY RXD/TAKEN: CPT | Mod: CPTII,S$GLB,, | Performed by: PHYSICIAN ASSISTANT

## 2023-12-12 PROCEDURE — 3079F PR MOST RECENT DIASTOLIC BLOOD PRESSURE 80-89 MM HG: ICD-10-PCS | Mod: CPTII,S$GLB,, | Performed by: PHYSICIAN ASSISTANT

## 2023-12-12 PROCEDURE — 1159F MED LIST DOCD IN RCRD: CPT | Mod: CPTII,S$GLB,, | Performed by: PHYSICIAN ASSISTANT

## 2023-12-12 PROCEDURE — 1160F PR REVIEW ALL MEDS BY PRESCRIBER/CLIN PHARMACIST DOCUMENTED: ICD-10-PCS | Mod: CPTII,S$GLB,, | Performed by: PHYSICIAN ASSISTANT

## 2023-12-12 PROCEDURE — 1160F RVW MEDS BY RX/DR IN RCRD: CPT | Mod: CPTII,S$GLB,, | Performed by: PHYSICIAN ASSISTANT

## 2023-12-12 PROCEDURE — 20610 LARGE JOINT ASPIRATION/INJECTION: L KNEE: ICD-10-PCS | Mod: LT,S$GLB,, | Performed by: PHYSICIAN ASSISTANT

## 2023-12-12 PROCEDURE — 3008F BODY MASS INDEX DOCD: CPT | Mod: CPTII,S$GLB,, | Performed by: PHYSICIAN ASSISTANT

## 2023-12-12 PROCEDURE — 3077F SYST BP >= 140 MM HG: CPT | Mod: CPTII,S$GLB,, | Performed by: PHYSICIAN ASSISTANT

## 2023-12-12 PROCEDURE — 1159F PR MEDICATION LIST DOCUMENTED IN MEDICAL RECORD: ICD-10-PCS | Mod: CPTII,S$GLB,, | Performed by: PHYSICIAN ASSISTANT

## 2023-12-12 PROCEDURE — 4010F PR ACE/ARB THEARPY RXD/TAKEN: ICD-10-PCS | Mod: CPTII,S$GLB,, | Performed by: PHYSICIAN ASSISTANT

## 2023-12-12 PROCEDURE — 99214 OFFICE O/P EST MOD 30 MIN: CPT | Mod: 25,S$GLB,, | Performed by: PHYSICIAN ASSISTANT

## 2023-12-12 PROCEDURE — 99214 PR OFFICE/OUTPT VISIT, EST, LEVL IV, 30-39 MIN: ICD-10-PCS | Mod: 25,S$GLB,, | Performed by: PHYSICIAN ASSISTANT

## 2023-12-12 PROCEDURE — 99999 PR PBB SHADOW E&M-EST. PATIENT-LVL IV: CPT | Mod: PBBFAC,,, | Performed by: PHYSICIAN ASSISTANT

## 2023-12-12 PROCEDURE — 3079F DIAST BP 80-89 MM HG: CPT | Mod: CPTII,S$GLB,, | Performed by: PHYSICIAN ASSISTANT

## 2023-12-12 PROCEDURE — 99999 PR PBB SHADOW E&M-EST. PATIENT-LVL IV: ICD-10-PCS | Mod: PBBFAC,,, | Performed by: PHYSICIAN ASSISTANT

## 2023-12-12 PROCEDURE — 3077F PR MOST RECENT SYSTOLIC BLOOD PRESSURE >= 140 MM HG: ICD-10-PCS | Mod: CPTII,S$GLB,, | Performed by: PHYSICIAN ASSISTANT

## 2023-12-12 PROCEDURE — 3008F PR BODY MASS INDEX (BMI) DOCUMENTED: ICD-10-PCS | Mod: CPTII,S$GLB,, | Performed by: PHYSICIAN ASSISTANT

## 2023-12-12 PROCEDURE — 20610 DRAIN/INJ JOINT/BURSA W/O US: CPT | Mod: LT,S$GLB,, | Performed by: PHYSICIAN ASSISTANT

## 2023-12-12 RX ORDER — TRAMADOL HYDROCHLORIDE 50 MG/1
50 TABLET ORAL EVERY 8 HOURS PRN
Qty: 20 TABLET | Refills: 0 | Status: SHIPPED | OUTPATIENT
Start: 2023-12-12

## 2023-12-12 RX ORDER — MELOXICAM 15 MG/1
15 TABLET ORAL DAILY PRN
Qty: 30 TABLET | Refills: 1 | Status: SHIPPED | OUTPATIENT
Start: 2023-12-12 | End: 2024-02-08

## 2023-12-12 NOTE — TELEPHONE ENCOUNTER
Spoke to the Pt about making an appt for the consult for some injections that were mentioned to him.

## 2023-12-12 NOTE — PROGRESS NOTES
CC: Left knee pain    Patient returns to clinic for follow up of left knee. See history below.  No new injuries since his last visit.  Pain has been gradually worsening over the past 5-6 months.  Patient reports medial joint line pain is most significant.  Reports recurrent effusions as well.  Patient has tried and failed a CSI and zilretta injection, medial  brace, PT, OTC pain meds, ice/heat, activity modification. He reports minimal to no relief from any intervention. Significantly limits his ADLs, reports he was unable to walk around with his family at recent beach trip. 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 Last CSI was June 2023, last Zilretta injection April 2023.       HPI (2/9/2023):  Patient is a 53-year-old male who presents today for follow-up evaluation of left knee pain.  He has a history of left knee arthroscopy with Dr. Kruse in 2014 as well as prior ACL/PCL reconstruction many years ago.  Patient reports that he began experiencing acute onset left knee pain with associated swelling 3-4 days ago.  He is not recall any recent fall, injury, or trauma to the left knee.  Pain is described as a dull ache that is worse with bending the knee, weight-bearing, and going up and down stairs.  He denies any associated numbness or tingling of the left lower extremity.  There is no associated subjective instability or mechanical grinding/catching.  He has tried corticosteroid injections and viscosupplementation injections in the past.  He has been taking over-the-counter anti-inflammatory medication which does provide mild relief.    Mechanical symptoms: catching  Subjective instability: (--)   Worse with knee flexion and squats  Better with rest.   Nocturnal symptoms: (+)    No previous surgeries or trauma on right knee      DATE OF PROCEDURE: 12/31/2014     PREOPERATIVE DIAGNOSES:   1. Left knee medial meniscus tear.   2. Left knee lateral meniscus tear     POSTOPERATIVE DIAGNOSES:    1. Left knee medial meniscus tear.   2. Left knee lateral meniscus tear  3. Left knee loose body     PROCEDURES:   1. Left knee arthroscopic partial medial and lateral meniscectomies  2. Left knee arthroscopic loose body removal     SURGEON: Sergey Kruse M.D.     REVIEW OF SYSTEMS:   Constitution: Negative. Negative for chills, fever and night sweats.   HENT: Negative for congestion and headaches.    Eyes: Negative for blurred vision, left vision loss and right vision loss.   Cardiovascular: Negative for chest pain and syncope.   Respiratory: Negative for cough and shortness of breath.    Endocrine: Negative for polydipsia, polyphagia and polyuria.   Hematologic/Lymphatic: Negative for bleeding problem. Does not bruise/bleed easily.   Skin: Negative for dry skin, itching and rash.   Musculoskeletal: Negative for falls. Positive for left knee pain and  muscle weakness.   Gastrointestinal: Negative for abdominal pain and bowel incontinence.   Genitourinary: Negative for bladder incontinence and nocturia.   Neurological: Negative for disturbances in coordination, loss of balance and seizures.   Psychiatric/Behavioral: Negative for depression. The patient does not have insomnia.    Allergic/Immunologic: Negative for hives and persistent infections.     PAST MEDICAL HISTORY:   Past Medical History:   Diagnosis Date    Allergy     Back pain     GERD (gastroesophageal reflux disease)        PAST SURGICAL HISTORY:   Past Surgical History:   Procedure Laterality Date    ANKLE SURGERY Left     ARTHROSCOPIC REPAIR OF ROTATOR CUFF OF SHOULDER Left 12/16/2022    Procedure: REPAIR, ROTATOR CUFF, ARTHROSCOPIC;  Surgeon: Montana Russ Jr., MD;  Location: [facility];  Service: Orthopedics;  Laterality: Left;  need [facility] notified cc    ARTHROSCOPY OF SHOULDER WITH DECOMPRESSION OF SUBACROMIAL SPACE Left 12/16/2022    Procedure: ARTHROSCOPY, SHOULDER, WITH SUBACROMIAL SPACE DECOMPRESSION;  Surgeon: Montana ENCINAS  Jeb White MD;  Location: [facility] OR;  Service: Orthopedics;  Laterality: Left;    BACK SURGERY  01/30/2017    INJECTION, TENDON SHEATH OR LIGAMENT, 1 TENDON SHEATH OR LIGAMENT Right 12/16/2022    Procedure: INJECTION,TENDON SHEATH OR LIGAMENT,1 TENDON SHEATH OR LIGAMENT;  Surgeon: Montana Russ Jr., MD;  Location: [facility] OR;  Service: Orthopedics;  Laterality: Right;    KNEE SURGERY      x 5    LAPAROSCOPIC APPENDECTOMY N/A 2/13/2023    Procedure: APPENDECTOMY, LAPAROSCOPIC;  Surgeon: Babs Cano MD;  Location: [facility] OR;  Service: General;  Laterality: N/A;    REPAIR, HERNIA, UMBILICAL  2/13/2023    Procedure: REPAIR, HERNIA, UMBILICAL;  Surgeon: Babs Cano MD;  Location: [facility] OR;  Service: General;;    VASECTOMY         FAMILY HISTORY:   Family History   Problem Relation Age of Onset    Prostate cancer Father     Kidney cancer Neg Hx        SOCIAL HISTORY:   Social History     Socioeconomic History    Marital status:    Tobacco Use    Smoking status: Never    Smokeless tobacco: Never   Substance and Sexual Activity    Alcohol use: Yes     Comment: occasional    Drug use: No    Sexual activity: Yes       MEDICATIONS:     Current Outpatient Medications:     diazePAM (VALIUM) 5 MG tablet, Take 1 tablet (5 mg total) by mouth every 6 (six) hours as needed for Anxiety., Disp: 4 tablet, Rfl: 0    famotidine (PEPCID) 20 MG tablet, Take 1 tablet (20 mg total) by mouth 2 (two) times daily. (Patient not taking: Reported on 8/24/2023), Disp: 60 tablet, Rfl: 0    gabapentin (NEURONTIN) 100 MG capsule, Take 100 mg by mouth 3 (three) times daily., Disp: , Rfl:     meloxicam (MOBIC) 15 MG tablet, Take 1 tablet (15 mg total) by mouth daily as needed for Pain., Disp: 30 tablet, Rfl: 1    pep injection, Inject .25 ml as directed   For compounding pharmacy use:  Add PAPAVERINE 30 mcg Add PHENTOLAMINE 10 mg Add ALPROSTADIL 100 mcg (Patient not taking: Reported on 8/24/2023), Disp: 2 vial, Rfl: 11    tamsulosin  (FLOMAX) 0.4 mg Cap, Take 1 capsule (0.4 mg total) by mouth once daily. (Patient taking differently: Take 0.4 mg by mouth once daily. Pt still taking), Disp: 30 capsule, Rfl: 11    testosterone cypionate (DEPOTESTOTERONE CYPIONATE) 200 mg/mL injection, Inject 1 mL (200 mg total) into the muscle every 14 (fourteen) days., Disp: 10 mL, Rfl: 1    traMADoL (ULTRAM) 50 mg tablet, Take 1 tablet (50 mg total) by mouth every 8 (eight) hours as needed for Pain., Disp: 20 tablet, Rfl: 0  No current facility-administered medications for this visit.    Facility-Administered Medications Ordered in Other Visits:     triamcinolone acetonide injection 40 mg, 40 mg, Intra-articular, , Sergey Kruse MD, 40 mg at 11/11/14 1327    ALLERGIES:   Review of patient's allergies indicates:   Allergen Reactions    Sulfa (sulfonamide antibiotics) Swelling    Neosporin (neomycin-polymyx) Rash     Topical irritation     Aspirin Swelling    Latex, natural rubber Hives and Itching    Shellfish containing products Swelling       VITAL SIGNS:   BP (!) 153/87   Pulse 102   Ht 6' (1.829 m)   Wt 121.6 kg (268 lb)   BMI 36.35 kg/m²      PHYSICAL EXAMINATION:  General:  The patient is alert and oriented x 3.  Mood is pleasant.  Observation of ears, eyes and nose reveal no gross abnormalities.  No labored breathing observed.    LEFT KNEE EXAMINATION     OBSERVATION / INSPECTION   Gait:   antalgic   Alignment:  Neutral   Scars:   None   Muscle atrophy: Mild  Effusion:  1+   Warmth:  None   Discoloration:   none     TENDERNESS / CREPITUS (T / C):          T / C      T / C   Patella   - / -   Lateral joint line   - / -   Peripatellar medial  -  Medial joint line    + / -   Peripatellar lateral +  Medial plica   - / -   Patellar tendon -   Popliteal fossa   - / -   Quad tendon   -   Gastrocnemius   -   Prepatellar Bursa - / -   Quadricep   -   Tibial tubercle  -  Thigh/hamstring  -   Pes anserine/HS -  Fibula    -   ITB   - / -  Tibia      -   Tib/fib joint  - / -  LCL    -     MFC   - / -   MCL: Proximal  -    LFC   - / -    Distal   -          ROM: (* = pain)  PASSIVE   ACTIVE    Left :   *5 / *5 / *110   *5 / *5 / *110     Right :    5 / 0 / 130   5 / 0 / 125    Patellofemoral examination:  See above noted areas of tenderness.   Patella position    Subluxation / dislocation: Centered           Sup. / Inf;   Normal   Crepitus (PF):    Absent   Patellar Mobility:       Medial-lateral:   Normal    Superior-inferior:  Normal    Inferior tilt   Normal    Patellar tendon:  Normal   Lateral tilt:    Normal   J-sign:     None   Patellofemoral grind:   + pain       MENISCAL SIGNS:     Pain on terminal extension:  +  Pain on terminal flexion:  +  Jamess maneuver:  + (for medial)  Squat     + (for medial and lateral)    LIGAMENT EXAMINATION:  ACL / Lachman:  normal (-1 to 2mm)    PCL-Post.  drawer: normal 0 to 2mm  MCL- Valgus:  normal 0 to 2mm  LCL- Varus:  normal 0 to 2mm  Pivot shift: normal (Equal)   Dial Test: difference c/w other side   At 30° flexion: normal (< 5°)    At 90° flexion: normal (< 5°)   Reverse Pivot Shift:   normal (Equal)     STRENGTH: (* = with pain) PAINFUL SIDE   Quadricep   *4/5   Hamstrin/5    EXTREMITY NEURO-VASCULAR EXAMINATION:   Sensation:  Grossly intact to light touch all dermatomal regions.   Motor Function:  Fully intact motor function at hip, knee, foot and ankle    DTRs;  quadriceps and  achilles 2+.  No clonus and downgoing Babinski.    Vascular status:  DP and PT pulses 2+, brisk capillary refill, symmetric.     OTHER FINDINGS:  Pain with SLR    Radiographic Findings 2021:    Right: No fracture dislocation bone destruction seen.     Left: There is ACL repair.  There is moderate DJD and a varus deformity.  No fracture dislocation bone destruction or OCD seen.    Xrays of the knees were ordered and reviewed by me today. These findings were discussed and reviewed with the patient.    X-rays left knee  (2/9/2023):  There is bilateral medial compartmental narrowing, left greater than right.  Surgical changes are noted of left knee cruciate ligament repair.  There is prominent osteophytosis arising from the medial femoral condyle and adjacent medial tibial plateau on the left.  No acute displaced fracture or dislocation of the left or right knee.  No large knee joint effusion.  No radiopaque foreign body.    Kellgren Fam grade 3, left knee    ASSESSMENT:      ICD-10-CM ICD-9-CM   1. Post-traumatic osteoarthritis of left knee  M17.32 715.26   2. Chronic pain of left knee  M25.562 719.46    G89.29 338.29             PLAN:     Prior imaging reviewed.    We discussed at length different treatment options including conservative vs surgical management. These include anti-inflammatories, acetaminophen, rest, ice, heat, formal physical therapy including strengthening and stretching exercises, home exercise programs, dry needling, corticosteroid versus viscosupplementation injections, biologic injections, and finally surgical intervention which would include total knee arthroplasty.      Patient would like to meet with one of the primary care sports medicine providers to discuss biologic injection options.  He would also like to meet with Dr. Ortiz to discuss Nain assisted total knee arthroplasty.  We will send messages to Dr. Ortiz and Debbie Clifton and Ruddy Jones for further scheduling of these appointments.     12 cc of normal joint fluid aspirated from the left knee.  See procedure note for details.    Prescription for meloxicam 15 mg 1 p.o. q.d. p.r.n. pain provided today.  I will also send in a prescription for a short course of tramadol 50 mg to take as needed for severe, breakthrough pain.        All of the patient's questions were answered and the patient will contact us if they have any questions or concerns in the interim.      Medical Dictation software was used during the dictation of portions or the  entirety of this medical record.  Phonetic or grammatic errors may exist due to the use of this software. For clarification, refer to the author of the document.

## 2023-12-12 NOTE — PROCEDURES
Large Joint Aspiration/Injection: L knee    Date/Time: 12/12/2023 9:00 AM    Performed by: Monico Ramos PA-C  Authorized by: Monico Ramos PA-C    Consent Done?:  Yes (Verbal)  Indications:  Joint swelling  Site marked: the procedure site was marked    Timeout: prior to procedure the correct patient, procedure, and site was verified    Prep: patient was prepped and draped in usual sterile fashion      Local anesthesia used?: Yes    Local anesthetic:  Lidocaine 2% without epinephrine  Anesthetic total (ml):  10      Details:  Needle Size:  18 G  Ultrasonic Guidance for needle placement?: No    Approach:  Superior  Location:  Knee  Site:  L knee  Aspirate amount (mL):  12  Aspirate:  Blood-tinged, clear and serous  Patient tolerance:  Patient tolerated the procedure well with no immediate complications    Aspiration Procedure  A time out was performed, including verification of patient ID, procedure, site and side, availability of information and equipment, review of safety issues, and agreement with consent, the procedure site was marked.    After time out was performed, the patient was prepped aseptically with povidone-iodine swabsticks. Approximately 10 cc of 1% lidocaine plain was injected with a 25-gauge needle into the aspiration site without difficulty.  12cc's of normal joint fluid were aspirated from the Superolateral  aspect of the left Knee Joint using an 18g x 1.5 needle in sterile fashion without complication. Bandage was applied.     Esa Yang was reminded to rest with RICE for 48 hours post injection and to call the clinic immediately for any adverse side effects as explained in clinic today.

## 2023-12-12 NOTE — TELEPHONE ENCOUNTER
----- Message from Chantelle Hammond MA sent at 12/12/2023  9:30 AM CST -----  Regarding: Patient Appt  Good morning,    This patient was seen by Monico kaminski for his left knee. He would like to schedule an appointment to discuss possible PRP. Please contact patient to schedule.    Thank you,  Chantelle

## 2024-01-19 ENCOUNTER — TELEPHONE (OUTPATIENT)
Dept: ORTHOPEDICS | Facility: CLINIC | Age: 54
End: 2024-01-19
Payer: COMMERCIAL

## 2024-01-19 NOTE — TELEPHONE ENCOUNTER
Called pt and stated that it is okay for him to take his steroid pack. And that it wont affect him getting surgery asap. Pt and pt's wife verbalized understanding and has no further questions.     ----- Message from Saige Espinoza sent at 1/19/2024  8:55 AM CST -----  Pt calling to see if he is allowed to take his steroid pack ,,  Methyiprednisolone    Confirmed patient's contact info below:  Contact Name: Esa Yang  Phone Number: [phone]

## 2024-01-30 DIAGNOSIS — G89.29 CHRONIC PAIN OF LEFT KNEE: Primary | ICD-10-CM

## 2024-01-30 DIAGNOSIS — M25.562 CHRONIC PAIN OF LEFT KNEE: Primary | ICD-10-CM

## 2024-01-31 ENCOUNTER — OFFICE VISIT (OUTPATIENT)
Dept: ORTHOPEDICS | Facility: CLINIC | Age: 54
End: 2024-01-31
Payer: COMMERCIAL

## 2024-01-31 ENCOUNTER — HOSPITAL ENCOUNTER (OUTPATIENT)
Dept: RADIOLOGY | Facility: HOSPITAL | Age: 54
Discharge: HOME | End: 2024-01-31
Attending: ORTHOPAEDIC SURGERY
Payer: COMMERCIAL

## 2024-01-31 VITALS — BODY MASS INDEX: 37.1 KG/M2 | HEIGHT: 72 IN | WEIGHT: 273.94 LBS

## 2024-01-31 DIAGNOSIS — M25.562 CHRONIC PAIN OF LEFT KNEE: ICD-10-CM

## 2024-01-31 DIAGNOSIS — M17.32 POST-TRAUMATIC OSTEOARTHRITIS OF LEFT KNEE: Primary | ICD-10-CM

## 2024-01-31 DIAGNOSIS — G89.29 CHRONIC PAIN OF LEFT KNEE: ICD-10-CM

## 2024-01-31 DIAGNOSIS — M17.32 POST-TRAUMATIC OSTEOARTHRITIS OF LEFT KNEE: ICD-10-CM

## 2024-01-31 PROCEDURE — 1159F MED LIST DOCD IN RCRD: CPT | Mod: CPTII,S$GLB,, | Performed by: ORTHOPAEDIC SURGERY

## 2024-01-31 PROCEDURE — 73560 X-RAY EXAM OF KNEE 1 OR 2: CPT | Mod: 26,59,RT, | Performed by: RADIOLOGY

## 2024-01-31 PROCEDURE — 73562 X-RAY EXAM OF KNEE 3: CPT | Mod: 26,LT,, | Performed by: RADIOLOGY

## 2024-01-31 PROCEDURE — 99214 OFFICE O/P EST MOD 30 MIN: CPT | Mod: S$GLB,,, | Performed by: ORTHOPAEDIC SURGERY

## 2024-01-31 PROCEDURE — 3008F BODY MASS INDEX DOCD: CPT | Mod: CPTII,S$GLB,, | Performed by: ORTHOPAEDIC SURGERY

## 2024-01-31 PROCEDURE — 73560 X-RAY EXAM OF KNEE 1 OR 2: CPT | Mod: 59,TC,RT

## 2024-01-31 PROCEDURE — 99999 PR PBB SHADOW E&M-EST. PATIENT-LVL III: CPT | Mod: PBBFAC,,, | Performed by: ORTHOPAEDIC SURGERY

## 2024-01-31 NOTE — PROGRESS NOTES
Subjective:     Patient ID: Esa Yang is a 54 y.o. male.    Chief Complaint: Pain of the Left Knee    HPI    Esa Yang is a 54 y.o. male here with a 10  years history of left knee pain. 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 There was a history of trauma. Multi lig knee at age 20.  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 The pain is severe The pain is located in the medial aspect of the knee. There is is not radiation.   There is not catching or locking.   The pain is described as dull. The patient has had prior surgery, below. It is aggravated by standing and walking.  It is alleviated by rest. There is not numbness or tingling of the lower extremity.  There is back pain.  He  has tried medications injections. Last injection 3 months ago. States last aspiration 1 month ago. Has tried oral steroid, ibuprofen tramadol, mobic.  They have helped.  He does have difficulty getting in or out of a car, getting dressed, or going up or down stairs.  The patient does not use an assistive device.    34 years ago, ACL/ PCL by Dr. Karina Kruse Left knee scope x 3 last in 2014    Past Medical History:   Diagnosis Date    Allergy     Back pain     GERD (gastroesophageal reflux disease)        Current Outpatient Medications on File Prior to Visit   Medication Sig Dispense Refill    diazePAM (VALIUM) 5 MG tablet Take 1 tablet (5 mg total) by mouth every 6 (six) hours as needed for Anxiety. 4 tablet 0    famotidine (PEPCID) 20 MG tablet Take 1 tablet (20 mg total) by mouth 2 (two) times daily. (Patient not taking: Reported on 8/24/2023) 60 tablet 0    gabapentin (NEURONTIN) 100 MG capsule Take 100 mg by mouth 3 (three) times daily.      meloxicam (MOBIC) 15 MG tablet Take 1 tablet (15 mg total) by mouth daily as needed for Pain. 30 tablet 1    pep injection Inject .25 ml as directed     For compounding pharmacy use:   Add PAPAVERINE 30 mcg  Add PHENTOLAMINE 10 mg  Add ALPROSTADIL 100 mcg (Patient  not taking: Reported on 8/24/2023) 2 vial 11    tamsulosin (FLOMAX) 0.4 mg Cap Take 1 capsule (0.4 mg total) by mouth once daily. (Patient taking differently: Take 0.4 mg by mouth once daily. Pt still taking) 30 capsule 11    testosterone cypionate (DEPOTESTOTERONE CYPIONATE) 200 mg/mL injection Inject 1 mL (200 mg total) into the muscle every 14 (fourteen) days. 10 mL 1    traMADoL (ULTRAM) 50 mg tablet Take 1 tablet (50 mg total) by mouth every 8 (eight) hours as needed for Pain. 20 tablet 0     Current Facility-Administered Medications on File Prior to Visit   Medication Dose Route Frequency Provider Last Rate Last Admin    triamcinolone acetonide injection 40 mg  40 mg Intra-articular  Sergey Kruse MD   40 mg at 11/11/14 1327       Past Surgical History:   Procedure Laterality Date    ANKLE SURGERY Left     ARTHROSCOPIC REPAIR OF ROTATOR CUFF OF SHOULDER Left 12/16/2022    Procedure: REPAIR, ROTATOR CUFF, ARTHROSCOPIC;  Surgeon: Montana Russ Jr., MD;  Location: [facility] OR;  Service: Orthopedics;  Laterality: Left;  need [facility] [employer] notified cc    ARTHROSCOPY OF SHOULDER WITH DECOMPRESSION OF SUBACROMIAL SPACE Left 12/16/2022    Procedure: ARTHROSCOPY, SHOULDER, WITH SUBACROMIAL SPACE DECOMPRESSION;  Surgeon: Montana Russ Jr., MD;  Location: [facility] OR;  Service: Orthopedics;  Laterality: Left;    BACK SURGERY  01/30/2017    INJECTION, TENDON SHEATH OR LIGAMENT, 1 TENDON SHEATH OR LIGAMENT Right 12/16/2022    Procedure: INJECTION,TENDON SHEATH OR LIGAMENT,1 TENDON SHEATH OR LIGAMENT;  Surgeon: Montana Russ Jr., MD;  Location: [facility] OR;  Service: Orthopedics;  Laterality: Right;    KNEE SURGERY      x 5    LAPAROSCOPIC APPENDECTOMY N/A 2/13/2023    Procedure: APPENDECTOMY, LAPAROSCOPIC;  Surgeon: Babs Cano MD;  Location: [facility] OR;  Service: General;  Laterality: N/A;    REPAIR, HERNIA, UMBILICAL  2/13/2023    Procedure: REPAIR, HERNIA, UMBILICAL;  Surgeon: Babs Cano MD;   Location: [facility] OR;  Service: General;;    VASECTOMY         Family History   Problem Relation Age of Onset    Prostate cancer Father     Kidney cancer Neg Hx        Social History     Socioeconomic History    Marital status:    Tobacco Use    Smoking status: Never    Smokeless tobacco: Never   Substance and Sexual Activity    Alcohol use: Yes     Comment: occasional    Drug use: No    Sexual activity: Yes         Review of Systems   Constitutional: Negative for chills, fever, malaise/fatigue and night sweats.   HENT:  Negative for congestion, hearing loss, hoarse voice and sore throat.    Eyes:  Negative for blurred vision, double vision and pain.   Cardiovascular:  Negative for chest pain, claudication, leg swelling and palpitations.   Respiratory:  Negative for cough, shortness of breath and wheezing.    Endocrine: Negative for polydipsia, polyphagia and polyuria.   Hematologic/Lymphatic: Negative for adenopathy and bleeding problem. Does not bruise/bleed easily.   Skin:  Negative for poor wound healing.   Musculoskeletal:  Positive for joint pain and stiffness.   Gastrointestinal:  Negative for abdominal pain, constipation, diarrhea, heartburn, nausea and vomiting.   Genitourinary:  Negative for bladder incontinence, dysuria, flank pain and urgency.   Neurological:  Negative for dizziness, focal weakness, headaches, numbness, paresthesias, sensory change and tremors.   Psychiatric/Behavioral:  Negative for depression, hallucinations and suicidal ideas. The patient is not nervous/anxious.    Allergic/Immunologic: Negative for persistent infections.       Objective:     Body mass index is 37.15 kg/m².     General    Constitutional: He is oriented to person, place, and time. He appears well-developed and well-nourished.   HENT:   Head: Normocephalic and atraumatic.   Right Ear: External ear normal.   Left Ear: External ear normal.   Eyes: Conjunctivae and EOM are normal.   Neck: Neck supple.    Cardiovascular:  Normal rate and intact distal pulses.            Pulmonary/Chest: Effort normal.   Abdominal: Soft.   Neurological: He is alert and oriented to person, place, and time.   Psychiatric: He has a normal mood and affect. His behavior is normal. Judgment and thought content normal.     General Musculoskeletal Exam   Gait: normal       Right Knee Exam     Inspection   Erythema: absent  Scars: absent  Swelling: absent  Effusion: absent  Deformity: absent  Bruising: absent    Tenderness   The patient is experiencing no tenderness.     Range of Motion   Extension:  0   Flexion:  130     Tests   Ligament Examination   Lachman: normal (-1 to 2mm)   MCL - Valgus: normal (0 to 2mm)  LCL - Varus: normal  Patella   Passive Patellar Tilt: neutral    Other   Sensation: normal    Left Knee Exam     Inspection   Erythema: absent  Scars: present  Swelling: absent  Effusion: present  Deformity: present (varus)  Bruising: absent    Tenderness   The patient tender to palpation of the medial joint line and lateral joint line.    Crepitus   The patient has crepitus of the patella.    Range of Motion   Extension:  10   Flexion:  100     Tests   Stability   Lachman: abnormal  - grade I  PCL-Posterior Drawer: normal (0 to 2mm)  MCL - Valgus: normal (0 to 2mm)  LCL - Varus: normal (0 to 2mm)  Patella   Passive Patellar Tilt: neutral    Other   Sensation: normal    Muscle Strength   Right Lower Extremity   Hip Abduction: 5/5   Quadriceps:  5/5   Hamstrin/5   Left Lower Extremity   Hip Abduction: 5/5   Quadriceps:  5/5   Hamstrin/5     Reflexes     Left Side  Quadriceps:  2+    Right Side   Quadriceps:  2+    Vascular Exam     Right Pulses  Dorsalis Pedis:      2+          Left Pulses  Dorsalis Pedis:      2+          Edema  Right Lower Leg: absent  Left Lower Leg: absent      Physical Exam  Constitutional:       Appearance: He is well-developed and well-nourished.   HENT:      Head: Normocephalic and atraumatic.       Right Ear: External ear normal.      Left Ear: External ear normal.   Eyes:      Extraocular Movements: EOM normal.      Conjunctiva/sclera: Conjunctivae normal.   Cardiovascular:      Rate and Rhythm: Normal rate.      Pulses: Intact distal pulses.           Dorsalis pedis pulses are 2+ on the right side and 2+ on the left side.   Pulmonary:      Effort: Pulmonary effort is normal.   Abdominal:      Palpations: Abdomen is soft.   Musculoskeletal:      Cervical back: Neck supple.      Right knee: No swelling, deformity or effusion.      Left knee: Deformity (varus) and effusion present. No swelling.      Right lower leg: No edema.      Left lower leg: No edema.   Neurological:      Mental Status: He is alert and oriented to person, place, and time.   Psychiatric:         Mood and Affect: Mood and affect normal.         Behavior: Behavior normal.         Thought Content: Thought content normal.         Judgment: Judgment normal.       Radiographs taken today and reviewed by me demonstrate severe arthritic change of the left KNEE(s).There  is not bone destruction.  There is not a fracture. The medial compartment is most involved.  There is a varus deformity. Evidence of previous ACL surgery.  The changes are tricompartmental.      Assessment:     Encounter Diagnoses   Name Primary?    Post-traumatic osteoarthritis of left knee     Chronic pain of left knee        Plan:      Esa was seen today for pain.    Diagnoses and all orders for this visit:    Post-traumatic osteoarthritis of left knee  -     Ambulatory referral/consult to Orthopedics    Chronic pain of left knee  -     Ambulatory referral/consult to Orthopedics      Treatment options were discussed. The surgical process of robotically assisted left knee replacement with hardware removal was discussed in detail with the patient including a detailed discussion of the procedure itself (including visual model, x-ray review, and literature review). We discussed  options including implant type and why I believe the implant selected is a good match for the patient's needs. The patient expressed understanding and agrees to proceed with the planned surgeryThe typical perioperative and post-operative course was discussed and perioperative risks were discussed to the patient's satisfaction.  Risks and complications discussed included but were not limited to the risks of anesthetic complications, infection, bleeding, wound healing complications, fracture through the pin sights, stiffness, aseptic loosening, instability, limb length inequality, neurologic dysfunction including numbness and weakness, additional surgery,  DVT, pulmonary embolism, perioperative medical risks (cardiac, pulmonary, renal, neurologic), and death and the patient elects to proceed.  The patient should get medically cleared and attend the joint seminar.      Eliquis 4 weeks for DVT prophylaxis-ASA allergy    Same day

## 2024-02-05 ENCOUNTER — CLINICAL SUPPORT (OUTPATIENT)
Dept: ORTHOPEDICS | Facility: CLINIC | Age: 54
End: 2024-02-05
Payer: COMMERCIAL

## 2024-02-05 DIAGNOSIS — M17.32 POST-TRAUMATIC OSTEOARTHRITIS OF LEFT KNEE: Primary | ICD-10-CM

## 2024-02-05 PROCEDURE — 99499 UNLISTED E&M SERVICE: CPT | Mod: S$GLB,,, | Performed by: ORTHOPAEDIC SURGERY

## 2024-02-07 DIAGNOSIS — M25.562 CHRONIC PAIN OF LEFT KNEE: ICD-10-CM

## 2024-02-07 DIAGNOSIS — G89.29 CHRONIC PAIN OF LEFT KNEE: ICD-10-CM

## 2024-02-07 DIAGNOSIS — M17.32 POST-TRAUMATIC OSTEOARTHRITIS OF LEFT KNEE: ICD-10-CM

## 2024-02-08 RX ORDER — MELOXICAM 15 MG/1
15 TABLET ORAL DAILY PRN
Qty: 30 TABLET | Refills: 1 | Status: SHIPPED | OUTPATIENT
Start: 2024-02-08 | End: 2024-04-10

## 2024-02-09 ENCOUNTER — TELEPHONE (OUTPATIENT)
Dept: ORTHOPEDICS | Facility: CLINIC | Age: 54
End: 2024-02-09
Payer: COMMERCIAL

## 2024-02-09 NOTE — TELEPHONE ENCOUNTER
Called pt and spoke to pt's spouse and asked that they move up their surgery date by 2 days to a new date of 3/26/2024 and pt's wife agreed to the new date as stated if anything earlier comes up they will take it. Pt's spouse verbalized understanding and has no further questions.

## 2024-02-19 ENCOUNTER — TELEPHONE (OUTPATIENT)
Dept: ORTHOPEDICS | Facility: CLINIC | Age: 54
End: 2024-02-19
Payer: COMMERCIAL

## 2024-02-19 DIAGNOSIS — M17.32 POST-TRAUMATIC OSTEOARTHRITIS OF LEFT KNEE: Primary | ICD-10-CM

## 2024-02-19 NOTE — TELEPHONE ENCOUNTER
Called pt's spouse and asked if they would like an earlier surgery date of 3/12/2024. She confirmed that she will ask Esa and call back with a decision. Spouse verbalized understanding and has no further questions.

## 2024-02-21 ENCOUNTER — TELEPHONE (OUTPATIENT)
Dept: ORTHOPEDICS | Facility: CLINIC | Age: 54
End: 2024-02-21
Payer: COMMERCIAL

## 2024-02-29 ENCOUNTER — TELEPHONE (OUTPATIENT)
Dept: INTERNAL MEDICINE | Facility: CLINIC | Age: 54
End: 2024-02-29
Payer: COMMERCIAL

## 2024-03-01 ENCOUNTER — HOSPITAL ENCOUNTER (OUTPATIENT)
Dept: RADIOLOGY | Facility: HOSPITAL | Age: 54
Discharge: HOME | End: 2024-03-01
Attending: ORTHOPAEDIC SURGERY
Payer: COMMERCIAL

## 2024-03-01 ENCOUNTER — OFFICE VISIT (OUTPATIENT)
Dept: ORTHOPEDICS | Facility: CLINIC | Age: 54
End: 2024-03-01
Payer: COMMERCIAL

## 2024-03-01 ENCOUNTER — OFFICE VISIT (OUTPATIENT)
Dept: INTERNAL MEDICINE | Facility: CLINIC | Age: 54
End: 2024-03-01
Payer: COMMERCIAL

## 2024-03-01 VITALS
SYSTOLIC BLOOD PRESSURE: 123 MMHG | BODY MASS INDEX: 35.67 KG/M2 | HEART RATE: 78 BPM | WEIGHT: 263 LBS | DIASTOLIC BLOOD PRESSURE: 86 MMHG

## 2024-03-01 VITALS
TEMPERATURE: 98 F | HEIGHT: 72 IN | BODY MASS INDEX: 35.54 KG/M2 | DIASTOLIC BLOOD PRESSURE: 78 MMHG | OXYGEN SATURATION: 97 % | HEART RATE: 86 BPM | SYSTOLIC BLOOD PRESSURE: 133 MMHG | WEIGHT: 262.38 LBS

## 2024-03-01 DIAGNOSIS — M65.331 TRIGGER MIDDLE FINGER OF RIGHT HAND: ICD-10-CM

## 2024-03-01 DIAGNOSIS — Z96.652 S/P TOTAL KNEE ARTHROPLASTY, LEFT: ICD-10-CM

## 2024-03-01 DIAGNOSIS — M17.32 POST-TRAUMATIC OSTEOARTHRITIS OF LEFT KNEE: ICD-10-CM

## 2024-03-01 DIAGNOSIS — R35.0 BENIGN PROSTATIC HYPERPLASIA WITH URINARY FREQUENCY: Primary | ICD-10-CM

## 2024-03-01 DIAGNOSIS — M17.32 POST-TRAUMATIC OSTEOARTHRITIS OF LEFT KNEE: Primary | ICD-10-CM

## 2024-03-01 DIAGNOSIS — N40.1 BENIGN PROSTATIC HYPERPLASIA WITH URINARY FREQUENCY: Primary | ICD-10-CM

## 2024-03-01 DIAGNOSIS — R73.03 PRE-DIABETES: ICD-10-CM

## 2024-03-01 PROBLEM — R39.11 HESITANCY: Status: RESOLVED | Noted: 2018-05-22 | Resolved: 2024-03-01

## 2024-03-01 PROBLEM — R39.198 SLOW URINARY STREAM: Status: RESOLVED | Noted: 2018-05-22 | Resolved: 2024-03-01

## 2024-03-01 PROBLEM — K42.9 UMBILICAL HERNIA WITHOUT OBSTRUCTION AND WITHOUT GANGRENE: Status: RESOLVED | Noted: 2023-02-13 | Resolved: 2024-03-01

## 2024-03-01 PROBLEM — K37 APPENDICITIS: Status: RESOLVED | Noted: 2023-02-13 | Resolved: 2024-03-01

## 2024-03-01 PROBLEM — Z98.890 S/P HERNIA REPAIR: Status: RESOLVED | Noted: 2023-02-28 | Resolved: 2024-03-01

## 2024-03-01 PROBLEM — R53.83 FATIGUE: Status: RESOLVED | Noted: 2018-09-19 | Resolved: 2024-03-01

## 2024-03-01 PROBLEM — Z98.890 S/P ACL RECONSTRUCTION: Status: RESOLVED | Noted: 2023-08-24 | Resolved: 2024-03-01

## 2024-03-01 PROBLEM — Z90.49 S/P APPENDECTOMY: Status: RESOLVED | Noted: 2023-02-28 | Resolved: 2024-03-01

## 2024-03-01 PROBLEM — R11.2 NAUSEA AND VOMITING: Status: RESOLVED | Noted: 2023-02-13 | Resolved: 2024-03-01

## 2024-03-01 PROBLEM — R79.89 LOW TESTOSTERONE IN MALE: Status: RESOLVED | Noted: 2018-05-22 | Resolved: 2024-03-01

## 2024-03-01 PROBLEM — N39.43 POST-VOID DRIBBLING: Status: RESOLVED | Noted: 2018-05-22 | Resolved: 2024-03-01

## 2024-03-01 PROBLEM — Z87.19 S/P HERNIA REPAIR: Status: RESOLVED | Noted: 2023-02-28 | Resolved: 2024-03-01

## 2024-03-01 PROCEDURE — 3008F BODY MASS INDEX DOCD: CPT | Mod: CPTII,S$GLB,, | Performed by: PHYSICIAN ASSISTANT

## 2024-03-01 PROCEDURE — 99999 PR PBB SHADOW E&M-EST. PATIENT-LVL III: CPT | Mod: PBBFAC,,, | Performed by: PHYSICIAN ASSISTANT

## 2024-03-01 PROCEDURE — 3044F HG A1C LEVEL LT 7.0%: CPT | Mod: CPTII,S$GLB,, | Performed by: NURSE PRACTITIONER

## 2024-03-01 PROCEDURE — 3079F DIAST BP 80-89 MM HG: CPT | Mod: CPTII,S$GLB,, | Performed by: PHYSICIAN ASSISTANT

## 2024-03-01 PROCEDURE — 99999 PR PBB SHADOW E&M-EST. PATIENT-LVL IV: CPT | Mod: PBBFAC,,, | Performed by: NURSE PRACTITIONER

## 2024-03-01 PROCEDURE — 1159F MED LIST DOCD IN RCRD: CPT | Mod: CPTII,S$GLB,, | Performed by: PHYSICIAN ASSISTANT

## 2024-03-01 PROCEDURE — 3008F BODY MASS INDEX DOCD: CPT | Mod: CPTII,S$GLB,, | Performed by: NURSE PRACTITIONER

## 2024-03-01 PROCEDURE — 3075F SYST BP GE 130 - 139MM HG: CPT | Mod: CPTII,S$GLB,, | Performed by: NURSE PRACTITIONER

## 2024-03-01 PROCEDURE — 3078F DIAST BP <80 MM HG: CPT | Mod: CPTII,S$GLB,, | Performed by: NURSE PRACTITIONER

## 2024-03-01 PROCEDURE — 99205 OFFICE O/P NEW HI 60 MIN: CPT | Mod: S$GLB,,, | Performed by: NURSE PRACTITIONER

## 2024-03-01 PROCEDURE — 3074F SYST BP LT 130 MM HG: CPT | Mod: CPTII,S$GLB,, | Performed by: PHYSICIAN ASSISTANT

## 2024-03-01 PROCEDURE — 1160F RVW MEDS BY RX/DR IN RCRD: CPT | Mod: CPTII,S$GLB,, | Performed by: PHYSICIAN ASSISTANT

## 2024-03-01 PROCEDURE — 73700 CT LOWER EXTREMITY W/O DYE: CPT | Mod: 26,LT,, | Performed by: INTERNAL MEDICINE

## 2024-03-01 PROCEDURE — 99214 OFFICE O/P EST MOD 30 MIN: CPT | Mod: S$GLB,,, | Performed by: PHYSICIAN ASSISTANT

## 2024-03-01 PROCEDURE — 3044F HG A1C LEVEL LT 7.0%: CPT | Mod: CPTII,S$GLB,, | Performed by: PHYSICIAN ASSISTANT

## 2024-03-01 PROCEDURE — 73700 CT LOWER EXTREMITY W/O DYE: CPT | Mod: TC,LT

## 2024-03-01 RX ORDER — SODIUM CHLORIDE 9 MG/ML
INJECTION, SOLUTION INTRAVENOUS
Status: CANCELLED | OUTPATIENT
Start: 2024-03-01

## 2024-03-01 RX ORDER — ACETAMINOPHEN 500 MG
1000 TABLET ORAL
Status: CANCELLED | OUTPATIENT
Start: 2024-03-01

## 2024-03-01 RX ORDER — FAMOTIDINE 20 MG/1
20 TABLET, FILM COATED ORAL 2 TIMES DAILY
Status: CANCELLED | OUTPATIENT
Start: 2024-03-01

## 2024-03-01 RX ORDER — MIDAZOLAM HYDROCHLORIDE 1 MG/ML
4 INJECTION INTRAMUSCULAR; INTRAVENOUS
Status: CANCELLED | OUTPATIENT
Start: 2024-03-01 | End: 2024-03-02

## 2024-03-01 RX ORDER — PREGABALIN 75 MG/1
75 CAPSULE ORAL NIGHTLY
Status: CANCELLED | OUTPATIENT
Start: 2024-03-01

## 2024-03-01 RX ORDER — METHOCARBAMOL 750 MG/1
750 TABLET, FILM COATED ORAL 3 TIMES DAILY
Status: CANCELLED | OUTPATIENT
Start: 2024-03-01

## 2024-03-01 RX ORDER — PREGABALIN 75 MG/1
75 CAPSULE ORAL
Status: CANCELLED | OUTPATIENT
Start: 2024-03-01

## 2024-03-01 RX ORDER — CELECOXIB 200 MG/1
200 CAPSULE ORAL DAILY
Status: CANCELLED | OUTPATIENT
Start: 2024-03-01

## 2024-03-01 RX ORDER — BISACODYL 10 MG/1
10 SUPPOSITORY RECTAL EVERY 12 HOURS PRN
Status: CANCELLED | OUTPATIENT
Start: 2024-03-01

## 2024-03-01 RX ORDER — PROCHLORPERAZINE EDISYLATE 5 MG/ML
5 INJECTION INTRAMUSCULAR; INTRAVENOUS EVERY 6 HOURS PRN
Status: CANCELLED | OUTPATIENT
Start: 2024-03-01

## 2024-03-01 RX ORDER — NALOXONE HCL 0.4 MG/ML
0.02 VIAL (ML) INJECTION
Status: CANCELLED | OUTPATIENT
Start: 2024-03-01

## 2024-03-01 RX ORDER — LIDOCAINE HYDROCHLORIDE 10 MG/ML
1 INJECTION, SOLUTION EPIDURAL; INFILTRATION; INTRACAUDAL; PERINEURAL
Status: CANCELLED | OUTPATIENT
Start: 2024-03-01

## 2024-03-01 RX ORDER — MUPIROCIN 20 MG/G
1 OINTMENT TOPICAL 2 TIMES DAILY
Status: CANCELLED | OUTPATIENT
Start: 2024-03-01 | End: 2024-03-06

## 2024-03-01 RX ORDER — MORPHINE SULFATE 2 MG/ML
2 INJECTION, SOLUTION INTRAMUSCULAR; INTRAVENOUS
Status: CANCELLED | OUTPATIENT
Start: 2024-03-01

## 2024-03-01 RX ORDER — TALC
6 POWDER (GRAM) TOPICAL NIGHTLY PRN
Status: CANCELLED | OUTPATIENT
Start: 2024-03-01

## 2024-03-01 RX ORDER — FENTANYL CITRATE 50 UG/ML
100 INJECTION, SOLUTION INTRAMUSCULAR; INTRAVENOUS
Status: CANCELLED | OUTPATIENT
Start: 2024-03-01 | End: 2024-03-02

## 2024-03-01 RX ORDER — ACETAMINOPHEN 500 MG
1000 TABLET ORAL EVERY 6 HOURS
Status: CANCELLED | OUTPATIENT
Start: 2024-03-01

## 2024-03-01 RX ORDER — OXYCODONE HYDROCHLORIDE 5 MG/1
5 TABLET ORAL
Status: CANCELLED | OUTPATIENT
Start: 2024-03-01

## 2024-03-01 RX ORDER — SODIUM CHLORIDE 9 MG/ML
INJECTION, SOLUTION INTRAVENOUS CONTINUOUS
Status: CANCELLED | OUTPATIENT
Start: 2024-03-01 | End: 2024-03-02

## 2024-03-01 RX ORDER — CEFAZOLIN SODIUM 2 G/50ML
2 SOLUTION INTRAVENOUS
Status: CANCELLED | OUTPATIENT
Start: 2024-03-01 | End: 2024-03-02

## 2024-03-01 RX ORDER — SODIUM CHLORIDE 0.9 % (FLUSH) 0.9 %
10 SYRINGE (ML) INJECTION
Status: CANCELLED | OUTPATIENT
Start: 2024-03-01

## 2024-03-01 RX ORDER — POLYETHYLENE GLYCOL 3350 17 G/17G
17 POWDER, FOR SOLUTION ORAL DAILY
Status: CANCELLED | OUTPATIENT
Start: 2024-03-01

## 2024-03-01 RX ORDER — OXYCODONE HYDROCHLORIDE 5 MG/1
10 TABLET ORAL
Status: CANCELLED | OUTPATIENT
Start: 2024-03-01

## 2024-03-01 RX ORDER — ASPIRIN 81 MG/1
81 TABLET ORAL 2 TIMES DAILY
Status: CANCELLED | OUTPATIENT
Start: 2024-03-01

## 2024-03-01 RX ORDER — MUPIROCIN 20 MG/G
1 OINTMENT TOPICAL
Status: CANCELLED | OUTPATIENT
Start: 2024-03-01

## 2024-03-01 RX ORDER — AMOXICILLIN 250 MG
1 CAPSULE ORAL 2 TIMES DAILY
Status: CANCELLED | OUTPATIENT
Start: 2024-03-01

## 2024-03-01 RX ORDER — FENTANYL CITRATE 50 UG/ML
25 INJECTION, SOLUTION INTRAMUSCULAR; INTRAVENOUS EVERY 5 MIN PRN
Status: CANCELLED | OUTPATIENT
Start: 2024-03-01

## 2024-03-01 RX ORDER — CELECOXIB 200 MG/1
400 CAPSULE ORAL ONCE
Status: CANCELLED | OUTPATIENT
Start: 2024-03-01 | End: 2024-03-01

## 2024-03-01 RX ORDER — ONDANSETRON HYDROCHLORIDE 2 MG/ML
4 INJECTION, SOLUTION INTRAVENOUS EVERY 8 HOURS PRN
Status: CANCELLED | OUTPATIENT
Start: 2024-03-01

## 2024-03-01 NOTE — HPI
This is a 54 y.o. male  who presents today for a preoperative evaluation in preparation for left knee TKR  Surgery is indicated for osteoarthritis    .   Patient is new to me.    The history has been obtained by speaking with the patient and reviewing the electronic medical record and/or outside health information. Significant health conditions for the perioperative period are discussed below in assessment and plan.     Patient reports current health status to be Good.  Denies any new symptoms before surgery.

## 2024-03-01 NOTE — H&P
CC:  Left knee pain    Esa Yang is a 54 y.o. male with history of Left knee pain. Pain is worse with activity and weight bearing.  Patient has experienced interference of activities of daily living due to decreased range of motion and an increase in joint pain and swelling.  Patient has failed non-operative treatment including NSAIDs, corticosteroid injections, viscosupplement injections, and activity modification.  Esa Yang currently ambulates independently.     Past Medical History:   Diagnosis Date    Allergy     Appendicitis 02/13/2023    Arthritis     Back pain     Chest pain 11/14/2014    Fatigue 09/19/2018    GERD (gastroesophageal reflux disease)     Hesitancy 05/22/2018    Hyperlipidemia     Low testosterone in male 05/22/2018    Nausea and vomiting 02/13/2023    Palpitations 11/14/2014    Post-void dribbling 05/22/2018    S/P ACL reconstruction 08/24/2023    S/P appendectomy 02/28/2023    S/P hernia repair 02/28/2023    S/P L knee surgery, arthroscopic medial/lateral menisectomy, loose body removal (12/31/14) 01/14/2015    Slow urinary stream 05/22/2018    Umbilical hernia without obstruction and without gangrene 02/13/2023       Past Surgical History:   Procedure Laterality Date    ANKLE SURGERY Left     ARTHROSCOPIC REPAIR OF ROTATOR CUFF OF SHOULDER Left 12/16/2022    Procedure: REPAIR, ROTATOR CUFF, ARTHROSCOPIC;  Surgeon: Montana Russ Jr., MD;  Location: [facility] OR;  Service: Orthopedics;  Laterality: Left;  need [facility] [employer] notified cc    ARTHROSCOPY OF SHOULDER WITH DECOMPRESSION OF SUBACROMIAL SPACE Left 12/16/2022    Procedure: ARTHROSCOPY, SHOULDER, WITH SUBACROMIAL SPACE DECOMPRESSION;  Surgeon: Montana Russ Jr., MD;  Location: [facility] OR;  Service: Orthopedics;  Laterality: Left;    BACK SURGERY  01/30/2017    INJECTION, TENDON SHEATH OR LIGAMENT, 1 TENDON SHEATH OR LIGAMENT Right 12/16/2022    Procedure: INJECTION,TENDON SHEATH OR LIGAMENT,1 TENDON SHEATH OR  LIGAMENT;  Surgeon: Montana Russ Jr., MD;  Location: [facility] OR;  Service: Orthopedics;  Laterality: Right;    KNEE SURGERY      x 5    LAPAROSCOPIC APPENDECTOMY N/A 2/13/2023    Procedure: APPENDECTOMY, LAPAROSCOPIC;  Surgeon: Babs Cano MD;  Location: [facility] OR;  Service: General;  Laterality: N/A;    REPAIR, HERNIA, UMBILICAL  2/13/2023    Procedure: REPAIR, HERNIA, UMBILICAL;  Surgeon: Babs Cano MD;  Location: [facility] OR;  Service: General;;    VASECTOMY         Family History   Problem Relation Age of Onset    Hypertension Father     Prostate cancer Father     Kidney cancer Neg Hx        Review of patient's allergies indicates:   Allergen Reactions    Sulfa (sulfonamide antibiotics) Swelling    Neosporin (neomycin-polymyx) Rash     Topical irritation     Aspirin Swelling    Latex, natural rubber Hives and Itching    Shellfish containing products Swelling         Current Outpatient Medications:     gabapentin (NEURONTIN) 100 MG capsule, Take 100 mg by mouth 3 (three) times daily., Disp: , Rfl:     meloxicam (MOBIC) 15 MG tablet, TAKE 1 TABLET(15 MG) BY MOUTH DAILY AS NEEDED FOR PAIN, Disp: 30 tablet, Rfl: 1    pep injection, Inject .25 ml as directed   For compounding pharmacy use:  Add PAPAVERINE 30 mcg Add PHENTOLAMINE 10 mg Add ALPROSTADIL 100 mcg, Disp: 2 vial, Rfl: 11    traMADoL (ULTRAM) 50 mg tablet, Take 1 tablet (50 mg total) by mouth every 8 (eight) hours as needed for Pain., Disp: 20 tablet, Rfl: 0    tamsulosin (FLOMAX) 0.4 mg Cap, Take 1 capsule (0.4 mg total) by mouth once daily. (Patient taking differently: Take 0.4 mg by mouth once daily. Pt still taking), Disp: 30 capsule, Rfl: 11  No current facility-administered medications for this visit.    Facility-Administered Medications Ordered in Other Visits:     triamcinolone acetonide injection 40 mg, 40 mg, Intra-articular, , Sergey Kruse MD, 40 mg at 11/11/14 1327    Review of Systems:  Constitutional: no fever or  chills  Eyes: no visual changes  ENT: no nasal congestion or sore throat  Respiratory: no cough or shortness of breath  Cardiovascular: no chest pain or palpitations  Gastrointestinal: no nausea or vomiting, tolerating diet  Genitourinary: no hematuria or dysuria  Integument/Breast: no rash or pruritis  Hematologic/Lymphatic: no easy bruising or lymphadenopathy  Musculoskeletal: positive for knee pain  Neurological: no seizures or tremors  Behavioral/Psych: no auditory or visual hallucinations  Endocrine: no heat or cold intolerance    PE:  /86   Pulse 78   Wt 119.3 kg (263 lb 0.1 oz)   BMI 35.67 kg/m²   General: Pleasant, cooperative, NAD   Gait: antalgic  HEENT: NCAT, sclera nonicteric   Lungs: Respirations clear bilaterally; equal and unlabored.   CV: S1S2; 2+ bilateral upper and lower extremity pulses.   Skin: Intact throughout with no rashes, erythema, or lesions  Extremities: No LE edema,  no erythema or warmth of the skin in either lower extremity.    Left knee exam:  Knee Range of Motion:0-130 degrees flexion, pain with passive range of motion  Effusion:none  Condition of skin:intact  Location of tenderness:Medial joint line   Strength:5 of 5  Stability: Lachman: stable, LCL: stable, MCL: stable, and PCL: stable    Hip Examination: painless PROM of hip     Radiographs: Radiographs reveal advanced degenerative changes including subchondral cyst formation, subchondral sclerosis, osteophyte formation, joint space narrowing.     Knee Alignment: normal    Diagnosis: Primary osteoarthritis Left knee    Plan: Left total knee arthroplasty    Due to the serious nature of total joint infection and the high prevalence of community acquired MRSA, vancomycin will be used perioperatively.

## 2024-03-01 NOTE — H&P (VIEW-ONLY)
CC:  Left knee pain    Esa Yang is a 54 y.o. male with history of Left knee pain. Pain is worse with activity and weight bearing.  Patient has experienced interference of activities of daily living due to decreased range of motion and an increase in joint pain and swelling.  Patient has failed non-operative treatment including NSAIDs, corticosteroid injections, viscosupplement injections, and activity modification.  Esa Yang currently ambulates independently.     Past Medical History:   Diagnosis Date    Allergy     Appendicitis 02/13/2023    Arthritis     Back pain     Chest pain 11/14/2014    Fatigue 09/19/2018    GERD (gastroesophageal reflux disease)     Hesitancy 05/22/2018    Hyperlipidemia     Low testosterone in male 05/22/2018    Nausea and vomiting 02/13/2023    Palpitations 11/14/2014    Post-void dribbling 05/22/2018    S/P ACL reconstruction 08/24/2023    S/P appendectomy 02/28/2023    S/P hernia repair 02/28/2023    S/P L knee surgery, arthroscopic medial/lateral menisectomy, loose body removal (12/31/14) 01/14/2015    Slow urinary stream 05/22/2018    Umbilical hernia without obstruction and without gangrene 02/13/2023       Past Surgical History:   Procedure Laterality Date    ANKLE SURGERY Left     ARTHROSCOPIC REPAIR OF ROTATOR CUFF OF SHOULDER Left 12/16/2022    Procedure: REPAIR, ROTATOR CUFF, ARTHROSCOPIC;  Surgeon: Montana Russ Jr., MD;  Location: [facility] OR;  Service: Orthopedics;  Laterality: Left;  need [facility] system [employer] notified cc    ARTHROSCOPY OF SHOULDER WITH DECOMPRESSION OF SUBACROMIAL SPACE Left 12/16/2022    Procedure: ARTHROSCOPY, SHOULDER, WITH SUBACROMIAL SPACE DECOMPRESSION;  Surgeon: Montana Russ Jr., MD;  Location: [facility] OR;  Service: Orthopedics;  Laterality: Left;    BACK SURGERY  01/30/2017    INJECTION, TENDON SHEATH OR LIGAMENT, 1 TENDON SHEATH OR LIGAMENT Right 12/16/2022    Procedure: INJECTION,TENDON SHEATH OR LIGAMENT,1 TENDON SHEATH OR  LIGAMENT;  Surgeon: Montana Russ Jr., MD;  Location: [facility] OR;  Service: Orthopedics;  Laterality: Right;    KNEE SURGERY      x 5    LAPAROSCOPIC APPENDECTOMY N/A 2/13/2023    Procedure: APPENDECTOMY, LAPAROSCOPIC;  Surgeon: Babs Cano MD;  Location: [facility] OR;  Service: General;  Laterality: N/A;    REPAIR, HERNIA, UMBILICAL  2/13/2023    Procedure: REPAIR, HERNIA, UMBILICAL;  Surgeon: Babs Cano MD;  Location: [facility] OR;  Service: General;;    VASECTOMY         Family History   Problem Relation Age of Onset    Hypertension Father     Prostate cancer Father     Kidney cancer Neg Hx        Review of patient's allergies indicates:   Allergen Reactions    Sulfa (sulfonamide antibiotics) Swelling    Neosporin (neomycin-polymyx) Rash     Topical irritation     Aspirin Swelling    Latex, natural rubber Hives and Itching    Shellfish containing products Swelling         Current Outpatient Medications:     gabapentin (NEURONTIN) 100 MG capsule, Take 100 mg by mouth 3 (three) times daily., Disp: , Rfl:     meloxicam (MOBIC) 15 MG tablet, TAKE 1 TABLET(15 MG) BY MOUTH DAILY AS NEEDED FOR PAIN, Disp: 30 tablet, Rfl: 1    pep injection, Inject .25 ml as directed   For compounding pharmacy use:  Add PAPAVERINE 30 mcg Add PHENTOLAMINE 10 mg Add ALPROSTADIL 100 mcg, Disp: 2 vial, Rfl: 11    traMADoL (ULTRAM) 50 mg tablet, Take 1 tablet (50 mg total) by mouth every 8 (eight) hours as needed for Pain., Disp: 20 tablet, Rfl: 0    tamsulosin (FLOMAX) 0.4 mg Cap, Take 1 capsule (0.4 mg total) by mouth once daily. (Patient taking differently: Take 0.4 mg by mouth once daily. Pt still taking), Disp: 30 capsule, Rfl: 11  No current facility-administered medications for this visit.    Facility-Administered Medications Ordered in Other Visits:     triamcinolone acetonide injection 40 mg, 40 mg, Intra-articular, , Sergey Kruse MD, 40 mg at 11/11/14 1327    Review of Systems:  Constitutional: no fever or  chills  Eyes: no visual changes  ENT: no nasal congestion or sore throat  Respiratory: no cough or shortness of breath  Cardiovascular: no chest pain or palpitations  Gastrointestinal: no nausea or vomiting, tolerating diet  Genitourinary: no hematuria or dysuria  Integument/Breast: no rash or pruritis  Hematologic/Lymphatic: no easy bruising or lymphadenopathy  Musculoskeletal: positive for knee pain  Neurological: no seizures or tremors  Behavioral/Psych: no auditory or visual hallucinations  Endocrine: no heat or cold intolerance    PE:  /86   Pulse 78   Wt 119.3 kg (263 lb 0.1 oz)   BMI 35.67 kg/m²   General: Pleasant, cooperative, NAD   Gait: antalgic  HEENT: NCAT, sclera nonicteric   Lungs: Respirations clear bilaterally; equal and unlabored.   CV: S1S2; 2+ bilateral upper and lower extremity pulses.   Skin: Intact throughout with no rashes, erythema, or lesions  Extremities: No LE edema,  no erythema or warmth of the skin in either lower extremity.    Left knee exam:  Knee Range of Motion:0-130 degrees flexion, pain with passive range of motion  Effusion:none  Condition of skin:intact  Location of tenderness:Medial joint line   Strength:5 of 5  Stability: Lachman: stable, LCL: stable, MCL: stable, and PCL: stable    Hip Examination: painless PROM of hip     Radiographs: Radiographs reveal advanced degenerative changes including subchondral cyst formation, subchondral sclerosis, osteophyte formation, joint space narrowing.     Knee Alignment: normal    Diagnosis: Primary osteoarthritis Left knee    Plan: Left total knee arthroplasty    Due to the serious nature of total joint infection and the high prevalence of community acquired MRSA, vancomycin will be used perioperatively.

## 2024-03-01 NOTE — ASSESSMENT & PLAN NOTE
Please monitor for urinary retention during the perioperative period  Monitor strict I&O and bladder scan as needed    Nocturia 1

## 2024-03-01 NOTE — OUTPATIENT SUBJECTIVE & OBJECTIVE
Outpatient Subjective & Objective      Chief Complaint: Preoperative evaulation, perioperative medical management, and complication reduction plan.     Functional Capacity:  Able to climb a flight of stairs without CP SOB or Syncope.  Able to meet 4 METs      Anesthesia issues:  30 years ago- spinal anesthesia - had a spinal headache    Difficulty mouth opening:n    Steroid use in the last 12 months: n     Dental Issues: n    Family anesthesia difficulty: None     Family Hx of Thrombosis n    Past Medical History:   Diagnosis Date    Allergy     Appendicitis 02/13/2023    Arthritis     Back pain     Chest pain 11/14/2014    Fatigue 09/19/2018    GERD (gastroesophageal reflux disease)     Hesitancy 05/22/2018    Hyperlipidemia     Nausea and vomiting 02/13/2023    Palpitations 11/14/2014    Post-void dribbling 05/22/2018    S/P ACL reconstruction 08/24/2023    S/P appendectomy 02/28/2023    S/P hernia repair 02/28/2023    S/P L knee surgery, arthroscopic medial/lateral menisectomy, loose body removal (12/31/14) 01/14/2015    Slow urinary stream 05/22/2018    Umbilical hernia without obstruction and without gangrene 02/13/2023     Past Surgical History:   Procedure Laterality Date    ANKLE SURGERY Left     ARTHROSCOPIC REPAIR OF ROTATOR CUFF OF SHOULDER Left 12/16/2022    Procedure: REPAIR, ROTATOR CUFF, ARTHROSCOPIC;  Surgeon: Montana Russ Jr., MD;  Location: [facility] OR;  Service: Orthopedics;  Laterality: Left;  need opus system Jehovah's witness notified cc    ARTHROSCOPY OF SHOULDER WITH DECOMPRESSION OF SUBACROMIAL SPACE Left 12/16/2022    Procedure: ARTHROSCOPY, SHOULDER, WITH SUBACROMIAL SPACE DECOMPRESSION;  Surgeon: Montana Russ Jr., MD;  Location: [facility] OR;  Service: Orthopedics;  Laterality: Left;    BACK SURGERY  01/30/2017    INJECTION, TENDON SHEATH OR LIGAMENT, 1 TENDON SHEATH OR LIGAMENT Right 12/16/2022    Procedure: INJECTION,TENDON SHEATH OR LIGAMENT,1 TENDON SHEATH OR LIGAMENT;  Surgeon: Montana ENCINAS  Jeb White MD;  Location: [facility] OR;  Service: Orthopedics;  Laterality: Right;    KNEE SURGERY      x 5    LAPAROSCOPIC APPENDECTOMY N/A 2/13/2023    Procedure: APPENDECTOMY, LAPAROSCOPIC;  Surgeon: Babs Cano MD;  Location: [facility] OR;  Service: General;  Laterality: N/A;    REPAIR, HERNIA, UMBILICAL  2/13/2023    Procedure: REPAIR, HERNIA, UMBILICAL;  Surgeon: Babs Cano MD;  Location: [facility] OR;  Service: General;;    VASECTOMY         Review of Systems   Constitutional:  Negative for chills, fatigue, fever and unexpected weight change.   HENT:  Negative for trouble swallowing and voice change.    Eyes:  Negative for photophobia and visual disturbance.        No acute visual changes   Respiratory:  Negative for cough, shortness of breath and wheezing.         STOP bang  Score 4    High risk ANDREW   Cardiovascular:  Negative for chest pain, palpitations and leg swelling.   Gastrointestinal:  Negative for abdominal pain, blood in stool, constipation, diarrhea, nausea and vomiting.        No FLD, Hepatitis, Cirrhosis  No BRB or black tarry stool   Genitourinary:  Negative for difficulty urinating, dysuria, frequency, hematuria and urgency.        Nocturia 1   Musculoskeletal:  Positive for arthralgias, back pain, gait problem and joint swelling. Negative for myalgias, neck pain and neck stiffness.   Neurological:  Negative for dizziness, tremors, seizures, syncope, weakness, light-headedness, numbness and headaches.   Psychiatric/Behavioral:  Negative for agitation, behavioral problems, confusion, decreased concentration, dysphoric mood, hallucinations, self-injury, sleep disturbance and suicidal ideas. The patient is not nervous/anxious and is not hyperactive.       VITALS  Visit Vitals  /78 (BP Location: Left arm, Patient Position: Sitting)   Pulse 86   Temp 97.8 °F (36.6 °C) (Oral)   Ht 6' (1.829 m)   Wt 119 kg (262 lb 5.6 oz)   SpO2 97%   BMI 35.58 kg/m²          Physical Exam  Constitutional:        General: He is not in acute distress.     Appearance: He is well-developed. He is not diaphoretic.   HENT:      Head: Normocephalic.      Right Ear: Hearing normal.      Left Ear: Hearing normal.      Nose: Nose normal.      Mouth/Throat:      Lips: Pink.      Mouth: Mucous membranes are moist.   Eyes:      General: Lids are normal.      Conjunctiva/sclera: Conjunctivae normal.      Pupils: Pupils are equal, round, and reactive to light.   Neck:      Vascular: No carotid bruit.      Trachea: Trachea and phonation normal.   Cardiovascular:      Rate and Rhythm: Normal rate and regular rhythm.      Pulses:           Carotid pulses are 2+ on the right side and 2+ on the left side.       Radial pulses are 2+ on the right side and 2+ on the left side.        Posterior tibial pulses are 2+ on the right side and 2+ on the left side.      Heart sounds: Normal heart sounds. No murmur heard.     No friction rub. No gallop.   Pulmonary:      Effort: Pulmonary effort is normal.      Breath sounds: Normal breath sounds.      Comments: Clear and equal  Anterior and Posterior BBS  Abdominal:      General: Abdomen is protuberant. Bowel sounds are normal. There is no distension.      Palpations: Abdomen is soft.      Tenderness: There is no abdominal tenderness.   Musculoskeletal:         General: No tenderness or deformity. Normal range of motion.      Cervical back: Normal range of motion.      Right lower leg: No edema.      Left lower leg: No edema.   Lymphadenopathy:      Head:      Right side of head: No submental, submandibular, tonsillar, preauricular, posterior auricular or occipital adenopathy.      Left side of head: No submental, submandibular, tonsillar, preauricular, posterior auricular or occipital adenopathy.      Cervical:      Right cervical: No superficial cervical adenopathy.     Left cervical: No superficial cervical adenopathy.   Skin:     General: Skin is warm and dry.      Capillary Refill: Capillary  refill takes 2 to 3 seconds.      Coloration: Skin is not pale.      Findings: No erythema or rash.   Neurological:      Mental Status: He is alert and oriented to person, place, and time.      GCS: GCS eye subscore is 4. GCS verbal subscore is 5. GCS motor subscore is 6.      Motor: No abnormal muscle tone.      Coordination: Coordination normal.   Psychiatric:         Attention and Perception: Attention and perception normal.         Mood and Affect: Mood and affect normal.         Speech: Speech normal.         Behavior: Behavior normal. Behavior is cooperative.         Thought Content: Thought content normal.         Cognition and Memory: Cognition and memory normal.         Judgment: Judgment normal.          Significant Labs:  Lab Results   Component Value Date    WBC 7.83 03/01/2024    HGB 15.0 03/01/2024    HCT 46.7 03/01/2024     03/01/2024    CHOL 213 (H) 11/14/2014    TRIG 121 11/14/2014    HDL 42 11/14/2014    ALT 11 03/01/2024    AST 16 03/01/2024     03/01/2024    K 4.1 03/01/2024     03/01/2024    CREATININE 1.3 03/01/2024    BUN 14 03/01/2024    CO2 29 03/01/2024    TSH 2.775 11/10/2014    PSA 0.48 07/06/2021    INR 1.0 03/01/2024    HGBA1C 6.0 (H) 03/01/2024       Diagnostic Studies: No relevant studies.    EKG:   Results for orders placed or performed in visit on 12/09/22   EKG 12-lead    Collection Time: 12/09/22  9:43 AM    Narrative    Test Reason : Z01.818,    Vent. Rate : 072 BPM     Atrial Rate : 072 BPM     P-R Int : 186 ms          QRS Dur : 088 ms      QT Int : 346 ms       P-R-T Axes : 047 019 040 degrees     QTc Int : 378 ms    Normal sinus rhythm  Nonspecific T wave abnormality  Abnormal ECG  Confirmed by Romeo Guillaume MD ([id number]) on 12/13/2022 10:42:56 AM    Referred By: ATTILA ROBERT           Confirmed By:Romeo Guillaume MD       2D ECHO:  TTE:  No results found for this or any previous visit.    ETHEL:  No results found for this or any previous visit.     Imaging      Active Cardiac Conditions: None      Revised Cardiac Risk Index   High -Risk Surgery  Intraperitoneal; Intrathoracic; suprainguinal vascular Yes- + 1 No- 0   History of Ischemic Heart Disease   (Hx of MI/positive exercise test/current chest pain due to ischemia/use of nitrate therapy/EKG with pathological Q waves) Yes- + 1 No- 0   History of CHF  (Pulmonary edema/bilateral rales or S3 gallop/PND/CXR showing pulmonary vascular redistribution) Yes- + 1 No- 0   History of CVA   (Prior stroke or TIA) Yes- + 1 No- 0   Pre-operative treatment with insulin Yes- + 1 No- 0   Pre-operative creatinine > 2mg/dl Yes- + 1 No- 0   Total:      Risk Status:  Estimated risk of cardiac complications after non-cardiac surgery using the Revised Cardiac Risk Index for Preoperative risk is 3.9 %      ARISCAT ([place]) risk index: Low: 1.6% risk of post-op pulmonary complications.    American Society of Anesthesiologists Physical Status classification (ASA): 2           No further cardiac workup needed prior to surgery.    Outpatient Subjective & Objective

## 2024-03-01 NOTE — PROGRESS NOTES
[facility] [address]  Progress Note    Patient Name: Esa Yang  MRN: [id number]  Date of Evaluation- 03/01/2024  PCP- Ant Mcdowell MD    Future cases for Esa Yang [[id number]]       Case ID Status Date Time Jd Procedure Provider Location    [id number] [facility] 3/26/2024  7:00  ARTHROPLASTY, KNEE, TOTAL, MADY COMPUTER-ASSISTED NAVIGATION: LEFT: SAME DAY Romeo Ortiz MD [[id number]] [facility]            HPI:  This is a 54 y.o. male  who presents today for a preoperative evaluation in preparation for left knee TKR  Surgery is indicated for osteoarthritis    .   Patient is new to me.    The history has been obtained by speaking with the patient and reviewing the electronic medical record and/or outside health information. Significant health conditions for the perioperative period are discussed below in assessment and plan.     Patient reports current health status to be Good.  Denies any new symptoms before surgery.       Subjective/ Objective:     Chief Complaint: Preoperative evaulation, perioperative medical management, and complication reduction plan.     Functional Capacity:  Able to climb a flight of stairs without CP SOB or Syncope.  Able to meet 4 METs      Anesthesia issues:  30 years ago- spinal anesthesia - had a spinal headache    Difficulty mouth opening:n    Steroid use in the last 12 months: n     Dental Issues: n    Family anesthesia difficulty: None     Family Hx of Thrombosis n    Past Medical History:   Diagnosis Date    Allergy     Appendicitis 02/13/2023    Arthritis     Back pain     Chest pain 11/14/2014    Fatigue 09/19/2018    GERD (gastroesophageal reflux disease)     Hesitancy 05/22/2018    Hyperlipidemia     Nausea and vomiting 02/13/2023    Palpitations 11/14/2014    Post-void dribbling 05/22/2018    S/P ACL reconstruction 08/24/2023    S/P appendectomy 02/28/2023    S/P hernia repair 02/28/2023    S/P L knee surgery, arthroscopic medial/lateral menisectomy, loose body  removal (12/31/14) 01/14/2015    Slow urinary stream 05/22/2018    Umbilical hernia without obstruction and without gangrene 02/13/2023     Past Surgical History:   Procedure Laterality Date    ANKLE SURGERY Left     ARTHROSCOPIC REPAIR OF ROTATOR CUFF OF SHOULDER Left 12/16/2022    Procedure: REPAIR, ROTATOR CUFF, ARTHROSCOPIC;  Surgeon: Montana Russ Jr., MD;  Location: [facility] OR;  Service: Orthopedics;  Laterality: Left;  need opus system Rastafarian notified cc    ARTHROSCOPY OF SHOULDER WITH DECOMPRESSION OF SUBACROMIAL SPACE Left 12/16/2022    Procedure: ARTHROSCOPY, SHOULDER, WITH SUBACROMIAL SPACE DECOMPRESSION;  Surgeon: Montana Russ Jr., MD;  Location: [facility] OR;  Service: Orthopedics;  Laterality: Left;    BACK SURGERY  01/30/2017    INJECTION, TENDON SHEATH OR LIGAMENT, 1 TENDON SHEATH OR LIGAMENT Right 12/16/2022    Procedure: INJECTION,TENDON SHEATH OR LIGAMENT,1 TENDON SHEATH OR LIGAMENT;  Surgeon: Montana Russ Jr., MD;  Location: [facility] OR;  Service: Orthopedics;  Laterality: Right;    KNEE SURGERY      x 5    LAPAROSCOPIC APPENDECTOMY N/A 2/13/2023    Procedure: APPENDECTOMY, LAPAROSCOPIC;  Surgeon: Babs Cano MD;  Location: [facility] OR;  Service: General;  Laterality: N/A;    REPAIR, HERNIA, UMBILICAL  2/13/2023    Procedure: REPAIR, HERNIA, UMBILICAL;  Surgeon: Babs Cano MD;  Location: [facility] OR;  Service: General;;    VASECTOMY         Review of Systems   Constitutional:  Negative for chills, fatigue, fever and unexpected weight change.   HENT:  Negative for trouble swallowing and voice change.    Eyes:  Negative for photophobia and visual disturbance.        No acute visual changes   Respiratory:  Negative for cough, shortness of breath and wheezing.         STOP bang  Score 4    High risk ANDREW   Cardiovascular:  Negative for chest pain, palpitations and leg swelling.   Gastrointestinal:  Negative for abdominal pain, blood in stool, constipation, diarrhea, nausea and vomiting.         No FLD, Hepatitis, Cirrhosis  No BRB or black tarry stool   Genitourinary:  Negative for difficulty urinating, dysuria, frequency, hematuria and urgency.        Nocturia 1   Musculoskeletal:  Positive for arthralgias, back pain, gait problem and joint swelling. Negative for myalgias, neck pain and neck stiffness.   Neurological:  Negative for dizziness, tremors, seizures, syncope, weakness, light-headedness, numbness and headaches.   Psychiatric/Behavioral:  Negative for agitation, behavioral problems, confusion, decreased concentration, dysphoric mood, hallucinations, self-injury, sleep disturbance and suicidal ideas. The patient is not nervous/anxious and is not hyperactive.       VITALS  Visit Vitals  /78 (BP Location: Left arm, Patient Position: Sitting)   Pulse 86   Temp 97.8 °F (36.6 °C) (Oral)   Ht 6' (1.829 m)   Wt 119 kg (262 lb 5.6 oz)   SpO2 97%   BMI 35.58 kg/m²          Physical Exam  Constitutional:       General: He is not in acute distress.     Appearance: He is well-developed. He is not diaphoretic.   HENT:      Head: Normocephalic.      Right Ear: Hearing normal.      Left Ear: Hearing normal.      Nose: Nose normal.      Mouth/Throat:      Lips: Pink.      Mouth: Mucous membranes are moist.   Eyes:      General: Lids are normal.      Conjunctiva/sclera: Conjunctivae normal.      Pupils: Pupils are equal, round, and reactive to light.   Neck:      Vascular: No carotid bruit.      Trachea: Trachea and phonation normal.   Cardiovascular:      Rate and Rhythm: Normal rate and regular rhythm.      Pulses:           Carotid pulses are 2+ on the right side and 2+ on the left side.       Radial pulses are 2+ on the right side and 2+ on the left side.        Posterior tibial pulses are 2+ on the right side and 2+ on the left side.      Heart sounds: Normal heart sounds. No murmur heard.     No friction rub. No gallop.   Pulmonary:      Effort: Pulmonary effort is normal.      Breath sounds:  Normal breath sounds.      Comments: Clear and equal  Anterior and Posterior BBS  Abdominal:      General: Abdomen is protuberant. Bowel sounds are normal. There is no distension.      Palpations: Abdomen is soft.      Tenderness: There is no abdominal tenderness.   Musculoskeletal:         General: No tenderness or deformity. Normal range of motion.      Cervical back: Normal range of motion.      Right lower leg: No edema.      Left lower leg: No edema.   Lymphadenopathy:      Head:      Right side of head: No submental, submandibular, tonsillar, preauricular, posterior auricular or occipital adenopathy.      Left side of head: No submental, submandibular, tonsillar, preauricular, posterior auricular or occipital adenopathy.      Cervical:      Right cervical: No superficial cervical adenopathy.     Left cervical: No superficial cervical adenopathy.   Skin:     General: Skin is warm and dry.      Capillary Refill: Capillary refill takes 2 to 3 seconds.      Coloration: Skin is not pale.      Findings: No erythema or rash.   Neurological:      Mental Status: He is alert and oriented to person, place, and time.      GCS: GCS eye subscore is 4. GCS verbal subscore is 5. GCS motor subscore is 6.      Motor: No abnormal muscle tone.      Coordination: Coordination normal.   Psychiatric:         Attention and Perception: Attention and perception normal.         Mood and Affect: Mood and affect normal.         Speech: Speech normal.         Behavior: Behavior normal. Behavior is cooperative.         Thought Content: Thought content normal.         Cognition and Memory: Cognition and memory normal.         Judgment: Judgment normal.          Significant Labs:  Lab Results   Component Value Date    WBC 7.83 03/01/2024    HGB 15.0 03/01/2024    HCT 46.7 03/01/2024     03/01/2024    CHOL 213 (H) 11/14/2014    TRIG 121 11/14/2014    HDL 42 11/14/2014    ALT 11 03/01/2024    AST 16 03/01/2024     03/01/2024    K  4.1 03/01/2024     03/01/2024    CREATININE 1.3 03/01/2024    BUN 14 03/01/2024    CO2 29 03/01/2024    TSH 2.775 11/10/2014    PSA 0.48 07/06/2021    INR 1.0 03/01/2024    HGBA1C 6.0 (H) 03/01/2024       Diagnostic Studies: No relevant studies.    EKG:   Results for orders placed or performed in visit on 12/09/22   EKG 12-lead    Collection Time: 12/09/22  9:43 AM    Narrative    Test Reason : Z01.818,    Vent. Rate : 072 BPM     Atrial Rate : 072 BPM     P-R Int : 186 ms          QRS Dur : 088 ms      QT Int : 346 ms       P-R-T Axes : 047 019 040 degrees     QTc Int : 378 ms    Normal sinus rhythm  Nonspecific T wave abnormality  Abnormal ECG  Confirmed by Romeo Guillaume MD ([id number]) on 12/13/2022 10:42:56 AM    Referred By: ATTILA ROBERT           Confirmed By:Romeo Guillaume MD       2D ECHO:  TTE:  No results found for this or any previous visit.    ETHEL:  No results found for this or any previous visit.     Imaging     Active Cardiac Conditions: None      Revised Cardiac Risk Index   High -Risk Surgery  Intraperitoneal; Intrathoracic; suprainguinal vascular Yes- + 1 No- 0   History of Ischemic Heart Disease   (Hx of MI/positive exercise test/current chest pain due to ischemia/use of nitrate therapy/EKG with pathological Q waves) Yes- + 1 No- 0   History of CHF  (Pulmonary edema/bilateral rales or S3 gallop/PND/CXR showing pulmonary vascular redistribution) Yes- + 1 No- 0   History of CVA   (Prior stroke or TIA) Yes- + 1 No- 0   Pre-operative treatment with insulin Yes- + 1 No- 0   Pre-operative creatinine > 2mg/dl Yes- + 1 No- 0   Total:      Risk Status:  Estimated risk of cardiac complications after non-cardiac surgery using the Revised Cardiac Risk Index for Preoperative risk is 3.9 %      [place]AT ([place]) risk index: Low: 1.6% risk of post-op pulmonary complications.    American Society of Anesthesiologists Physical Status classification (ASA): 2           No further cardiac workup needed prior to  surgery.        Preoperative cardiac risk assessment-  The patient does not have any active cardiac conditions . Revised cardiac risk index predictors- ---.Functional capacity is more than 4 Mets. He will be undergoing a Orthopedic procedure that carries a Moderate Risk risk     The estimated risk of the rate of adverse cardiac outcomes  3.9    No further cardiac work up is indicated prior to proceeding with the surgery     Orders Placed This Encounter    CBC Auto Differential    Comprehensive Metabolic Panel    Hemoglobin A1C    Protime-INR       American Society of Anesthesiologists Physical status classification ( ASA ) class: 3     Postoperative pulmonary complication risk assessment: 1.6     ARISCAT ( [place]) risk index- risk class -  Low, if duration of surgery is under 3 hours, intermediate, if duration of surgery is over 3 hours       Assessment/Plan:     Benign prostatic hyperplasia with urinary frequency  Please monitor for urinary retention during the perioperative period  Monitor strict I&O and bladder scan as needed    Nocturia 1    Post-traumatic osteoarthritis of left knee  Surgery scheduled 3/26/24    Trigger middle finger of right hand  Treated w/ steroids has not reoccured    Pre-diabetes  A1c 6      Preventive perioperative care    Thromboembolic prophylaxis:  His risk factors for thrombosis include morbid obesity, surgical procedure, age, and reduced mobility.I suggest  thromboembolic prophylaxis ( mechanical/pharmacological, weighing the risk benefits of pharmacological agent use considering natalia procedural bleeding )  during the perioperative period.I suggested being active in the post operative period.      Postoperative pulmonary complication prophylaxis-Risk factors for post operative pulmonary complications include ASA class >2- I suggest incentive spirometry use, early ambulation, and pain control so as to avoid diaphragmatic splinting  Brush teeth twice per day, oral rinses, sleep with the  head of the bed up 30 degrees     Renal complication prophylaxis-Risk factors for renal complications include age . I suggest keeping him well hydrated and avoidance/ minimizing the use of  NSAID's,CASTILLO 2 Inhibitors ,IV contrast if possible in the perioperative period.I suggested drinking 2 litre's of water a day      Surgical site Infection Prophylaxis-I  suggest appropriate antibiotic for Prophylaxis against Surgical site infections Shower with Hibiclens in the night before surgery and the morning of surgery     In view of Spine procedure the patient  is at risk of postoperative urinary retention.  I suggest avoidance / minimizing the of  Benzodiazepines,Anticholinergic medication,antihistamines ( Benadryl) , if possible in the perioperative period. I suggest using the minimum possible use of opioids for the minimum period of time in the perioperative period. Benadryl avoidance suggested      This visit was focused on Preoperative evaluation, Perioperative Medical management, complication reduction plans. I suggest that the patient follows up with primary care or relevant sub specialists for ongoing health care.    I appreciate the opportunity to be involved in this patients care. Please feel free to contact me if there were any questions about this consultation.    Patient is optimized    I spent a total of 68 minutes on the day of the visit.This includes face to face time and non-face to face time preparing to see the patient (eg, review of tests), obtaining and/or reviewing separately obtained history, documenting clinical information in the electronic or other health record, independently interpreting results and communicating results to the patient/family/caregiver, or care coordinator.     David Carr NP  Perioperative Medicine  [facility]   Pager [phone]

## 2024-03-01 NOTE — DISCHARGE INSTRUCTIONS
Your surgery has been scheduled for:_______3/26/24___________________________________    You should report to:  ____[facility], located on the [place] side of the first floor of the           [facility] ([phone])  ____[facility], located on the [facility] side of the            Second floor of the [facility] ([phone])  ____3rd Floor [facility] located on the [facility] side of the [facility] [phone]  __X__[facility]: located at [address] [place] [address]. [address].     Please Note   Tell your doctor if you take Aspirin, products containing Aspirin, herbal medications  or blood thinners, such as Coumadin, Ticlid, or Plavix.  (Consult your provider regarding holding or stopping before surgery).  Arrange for someone to drive you home following surgery.  You will not be allowed to leave the surgical facility alone or drive yourself home following sedation and anesthesia.    Before Surgery  Stop taking all herbal medications, vitamins, and supplements 7 days prior to surgery  No Motrin/Advil (Ibuprofen) 7 days before surgery  No Aleve (Naproxen) 7 days before surgery  Stop Taking Asprin, products containing Aspirin __7___days before surgery   No Goody's/BC Powder 7 days before surgery  Refrain from drinking alcoholic beverages for 24 hours before and after surgery  Stop or limit smoking at least 24 hours prior to surgery  You may take Tylenol for pain    Night before Surgery  Do not eat or drink after midnight  Take a shower or bath (shower is recommended).  Bathe with Hibiclens soap or an antibacterial soap from the neck down.  If not supplied by your surgeon, hibiclens soap will need to be purchased over the counter in pharmacy.  Rinse soap off thoroughly.  Shampoo your hair with your regular shampoo    The Day of Surgery  Take another bath or shower with hibiclens  or any antibacterial soap, to reduce the chance of infection.  Take heart and blood pressure medications with a small sip of water, as advised by the perioperative team.  Do not take fluid pills  You may brush your teeth and rinse your mouth, but do not swallow any additional water.   Do not apply perfumes, powder, body lotions or deodorant on the day of surgery.  Nail polish should be removed.  Do not wear makeup or moisturizer  Wear comfortable clothes, such as a button front shirt and loose fitting pants.  Leave all jewelry, including body piercings, and valuables at home.    Bring any devices you will neeed after surgery such as crutches or canes.  If you have sleep apnea, please bring your CPAP machine  In the event that your physical condition changes including the onset of a cold or respiratory illness, or if you have to delay or cancel your surgery, please notify your surgeon.

## 2024-03-01 NOTE — PROGRESS NOTES
Esa Yang is a 54 y.o. year old here today for preoperative visit in preparation for a Left total knee arthroplasty to be performed by Dr. Ortiz on 03/26/24.  he was last seen and treated in the clinic on 1/31/2024. he will be medically optimized by the pre op center. There has been no significant change in medical status since last visit. No fever, chills, malaise, or unexplained weight change.      Allergies, Medications, past medical and surgical history reviewed.    Focused examination performed.    Patient declined to see surgeon today. All questions answered. Patient encouraged to call with questions. Contact information given.     Pre, natalia, and post operative procedures and expectations discussed. Goals of successful surgery reviewed and include manageable pain levels, surgical site free of infection, medication management, and ambulation with PT/OT assistance. Healthy weight management discussed with patient and caregiver who were receptive to eduction of healthy diet and activity. No other necessary lifestyle changes identified. Educated patient about signs and symptoms of infection, medication management, anticoagulation therapy, risk of tobacco and alcohol use, and self-care to promote healing. Surgical guide given for future reference. Hibiclens given to patient with instructions. All questions were answered.     Esa Yang verbalized an understanding to the education and goals. Patient has displayed readiness to engage in care and is ready to proceed with surgery.  Patient reports wife is able and ready to provide assistance at home after discharge.    Surgical and blood consents signed.    DME will be arranged. The mobility limitation cannot be sufficiently resolved by the use of a cane. Patient's functional mobility deficit can be sufficiently resolved with the use of a (Rolling Walker or Walker). Patient's mobility limitation significantly impairs their ability to participate in  "one of more activities of daily living. The use of a (Rolling Walker or Walker) will significantly improve the patient's ability to participate in MRADLS and the patient will use it on regular basis in the home."     Esa Yang will contact us if there are any questions, concerns, or changes in medical status prior to surgery.       Joint class: 02/05/2024    Patient has discussed discharge planning with surgeon. Patient will be discharged to home following surgery.   patient will be scheduled with [facility]. PT will be done at the [facility] location.    30 minutes of time was spent on patient education, review of records, templating, H&P, , appointment scheduling and optimizing patient for surgery.     "

## 2024-03-18 ENCOUNTER — CLINICAL SUPPORT (OUTPATIENT)
Dept: REHABILITATION | Facility: HOSPITAL | Age: 54
End: 2024-03-18
Payer: COMMERCIAL

## 2024-03-18 DIAGNOSIS — G89.29 CHRONIC PAIN OF LEFT KNEE: Primary | ICD-10-CM

## 2024-03-18 DIAGNOSIS — M25.562 CHRONIC PAIN OF LEFT KNEE: Primary | ICD-10-CM

## 2024-03-22 DIAGNOSIS — Z96.659 STATUS POST TOTAL KNEE REPLACEMENT, UNSPECIFIED LATERALITY: Primary | ICD-10-CM

## 2024-03-22 RX ORDER — PANTOPRAZOLE SODIUM 40 MG/1
40 TABLET, DELAYED RELEASE ORAL DAILY
Qty: 30 TABLET | Refills: 0 | Status: ON HOLD | OUTPATIENT
Start: 2024-03-22 | End: 2024-04-11 | Stop reason: HOSPADM

## 2024-03-22 RX ORDER — OXYCODONE HYDROCHLORIDE 5 MG/1
TABLET ORAL
Qty: 50 TABLET | Refills: 0 | Status: ON HOLD | OUTPATIENT
Start: 2024-03-22 | End: 2024-04-11 | Stop reason: HOSPADM

## 2024-03-22 RX ORDER — DEXTROMETHORPHAN HYDROBROMIDE, GUAIFENESIN 5; 100 MG/5ML; MG/5ML
650 LIQUID ORAL EVERY 8 HOURS
Qty: 120 TABLET | Refills: 0 | Status: ON HOLD | OUTPATIENT
Start: 2024-03-22 | End: 2024-04-11 | Stop reason: HOSPADM

## 2024-03-22 RX ORDER — CELECOXIB 200 MG/1
200 CAPSULE ORAL DAILY
Qty: 30 CAPSULE | Refills: 0 | Status: ON HOLD | OUTPATIENT
Start: 2024-03-22 | End: 2024-04-11 | Stop reason: HOSPADM

## 2024-03-22 RX ORDER — AMOXICILLIN 250 MG
1 CAPSULE ORAL DAILY
Qty: 30 TABLET | Refills: 0 | Status: ON HOLD | OUTPATIENT
Start: 2024-03-22 | End: 2024-04-11 | Stop reason: HOSPADM

## 2024-03-22 RX ORDER — METHOCARBAMOL 750 MG/1
750 TABLET, FILM COATED ORAL 4 TIMES DAILY PRN
Qty: 40 TABLET | Refills: 0 | Status: SHIPPED | OUTPATIENT
Start: 2024-03-22 | End: 2024-04-01

## 2024-03-25 ENCOUNTER — PATIENT MESSAGE (OUTPATIENT)
Dept: ADMINISTRATIVE | Facility: OTHER | Age: 54
End: 2024-03-25
Payer: COMMERCIAL

## 2024-03-25 ENCOUNTER — TELEPHONE (OUTPATIENT)
Dept: ORTHOPEDICS | Facility: CLINIC | Age: 54
End: 2024-03-25
Payer: COMMERCIAL

## 2024-03-25 NOTE — TELEPHONE ENCOUNTER
I called the patient today regarding surgery on 03/36/2024 with Dr. Romeo Ortiz. I informed the patient that his surgery will be at  [facility] [address] ([facility] [place] [address]). I informed the patient they must arrive at 5:00am and their surgery will start at 7:00am.     Per the [facility] COVID-19 Pre-Procedural Testing Policy (administered 10/26/2022), patients do not need tested for COVID-19 regardless of vaccination status.    I reminded the patient that they cannot eat or drink after midnight, the night before surgery.      I reminded the patient to be careful of their skin over the next few days to make sure they do not get any cuts, scratches or scrapes.    The patient verbalized that they have received their walker, bedside commode and shower chair from [facility].    The patient verbalized understanding and has no further questions.

## 2024-03-26 ENCOUNTER — HOSPITAL ENCOUNTER (OUTPATIENT)
Facility: HOSPITAL | Age: 54
Discharge: HOME | End: 2024-03-26
Attending: ORTHOPAEDIC SURGERY | Admitting: ORTHOPAEDIC SURGERY
Payer: COMMERCIAL

## 2024-03-26 VITALS
HEIGHT: 72 IN | DIASTOLIC BLOOD PRESSURE: 89 MMHG | OXYGEN SATURATION: 98 % | WEIGHT: 263 LBS | SYSTOLIC BLOOD PRESSURE: 144 MMHG | TEMPERATURE: 99 F | RESPIRATION RATE: 18 BRPM | HEART RATE: 75 BPM | BODY MASS INDEX: 35.62 KG/M2

## 2024-03-26 DIAGNOSIS — Z96.652 S/P TOTAL KNEE ARTHROPLASTY, LEFT: Primary | ICD-10-CM

## 2024-03-26 DIAGNOSIS — M17.32 POST-TRAUMATIC OSTEOARTHRITIS OF LEFT KNEE: ICD-10-CM

## 2024-03-26 DIAGNOSIS — G89.29 CHRONIC PAIN OF LEFT KNEE: ICD-10-CM

## 2024-03-26 DIAGNOSIS — M17.32 POST-TRAUMATIC OSTEOARTHRITIS OF LEFT KNEE: Primary | ICD-10-CM

## 2024-03-26 DIAGNOSIS — M25.562 CHRONIC PAIN OF LEFT KNEE: ICD-10-CM

## 2024-03-26 DIAGNOSIS — Z96.652 S/P TOTAL KNEE ARTHROPLASTY, LEFT: ICD-10-CM

## 2024-03-26 PROCEDURE — 63600175 PHARM REV CODE 636 W HCPCS: Performed by: PHYSICIAN ASSISTANT

## 2024-03-26 PROCEDURE — 99499 UNLISTED E&M SERVICE: CPT | Mod: ,,, | Performed by: ORTHOPAEDIC SURGERY

## 2024-03-26 PROCEDURE — 99900035 HC TECH TIME PER 15 MIN (STAT)

## 2024-03-26 PROCEDURE — 94761 N-INVAS EAR/PLS OXIMETRY MLT: CPT

## 2024-03-26 PROCEDURE — 25000003 PHARM REV CODE 250: Performed by: PHYSICIAN ASSISTANT

## 2024-03-26 RX ORDER — MIDAZOLAM HYDROCHLORIDE 1 MG/ML
4 INJECTION, SOLUTION INTRAMUSCULAR; INTRAVENOUS
Status: DISCONTINUED | OUTPATIENT
Start: 2024-03-26 | End: 2024-03-26 | Stop reason: HOSPADM

## 2024-03-26 RX ORDER — LIDOCAINE HYDROCHLORIDE 10 MG/ML
1 INJECTION, SOLUTION EPIDURAL; INFILTRATION; INTRACAUDAL; PERINEURAL
Status: DISCONTINUED | OUTPATIENT
Start: 2024-03-26 | End: 2024-03-26 | Stop reason: HOSPADM

## 2024-03-26 RX ORDER — PREGABALIN 75 MG/1
75 CAPSULE ORAL
Status: COMPLETED | OUTPATIENT
Start: 2024-03-26 | End: 2024-03-26

## 2024-03-26 RX ORDER — SODIUM CHLORIDE 9 MG/ML
INJECTION, SOLUTION INTRAVENOUS
Status: COMPLETED | OUTPATIENT
Start: 2024-03-26 | End: 2024-03-26

## 2024-03-26 RX ORDER — CELECOXIB 200 MG/1
400 CAPSULE ORAL ONCE
Status: DISCONTINUED | OUTPATIENT
Start: 2024-03-26 | End: 2024-03-26 | Stop reason: HOSPADM

## 2024-03-26 RX ORDER — ROPIVACAINE HYDROCHLORIDE 2 MG/ML
INJECTION, SOLUTION EPIDURAL; INFILTRATION; PERINEURAL CONTINUOUS
Status: DISCONTINUED | OUTPATIENT
Start: 2024-03-26 | End: 2024-03-26 | Stop reason: HOSPADM

## 2024-03-26 RX ORDER — MUPIROCIN 20 MG/G
1 OINTMENT TOPICAL
Status: COMPLETED | OUTPATIENT
Start: 2024-03-26 | End: 2024-03-26

## 2024-03-26 RX ORDER — FENTANYL CITRATE 50 UG/ML
100 INJECTION, SOLUTION INTRAMUSCULAR; INTRAVENOUS
Status: DISCONTINUED | OUTPATIENT
Start: 2024-03-26 | End: 2024-03-26 | Stop reason: HOSPADM

## 2024-03-26 RX ORDER — ACETAMINOPHEN 500 MG
1000 TABLET ORAL
Status: COMPLETED | OUTPATIENT
Start: 2024-03-26 | End: 2024-03-26

## 2024-03-26 RX ADMIN — SODIUM CHLORIDE: 9 INJECTION, SOLUTION INTRAVENOUS at 06:03

## 2024-03-26 RX ADMIN — VANCOMYCIN HYDROCHLORIDE 1500 MG: 1.5 INJECTION, POWDER, LYOPHILIZED, FOR SOLUTION INTRAVENOUS at 06:03

## 2024-03-26 RX ADMIN — MUPIROCIN 1 G: 20 OINTMENT TOPICAL at 06:03

## 2024-03-26 RX ADMIN — ACETAMINOPHEN 1000 MG: 500 TABLET ORAL at 06:03

## 2024-03-26 RX ADMIN — PREGABALIN 75 MG: 75 CAPSULE ORAL at 06:03

## 2024-03-26 NOTE — PLAN OF CARE
Surgery team notified pt has a productive cough and went to the doctor last week. Started on abx and complete 5 out of 10 doses.

## 2024-03-26 NOTE — PLAN OF CARE
Pre op checklist complete. Pt resting in bed with call light in reach. Pt belongings at bedside and plan to place in locker #8. Pt spouse brought to bedside. Pt still needs anes cosnent/update/site venessa.

## 2024-03-26 NOTE — INTERVAL H&P NOTE
The patient had a cough and fluid behind his ears and went to an urgent care.  He was started on antibiotics (amoxicillin) on 03/20.  Based on this information we will need to postpone the surgery.  We were not aware of this until this morning.

## 2024-04-01 ENCOUNTER — HOSPITAL ENCOUNTER (OUTPATIENT)
Dept: RADIOLOGY | Facility: HOSPITAL | Age: 54
Discharge: HOME | End: 2024-04-01
Attending: NURSE PRACTITIONER
Payer: COMMERCIAL

## 2024-04-01 ENCOUNTER — OFFICE VISIT (OUTPATIENT)
Dept: ORTHOPEDICS | Facility: CLINIC | Age: 54
End: 2024-04-01
Payer: COMMERCIAL

## 2024-04-01 DIAGNOSIS — G89.29 CHRONIC PAIN OF LEFT KNEE: ICD-10-CM

## 2024-04-01 DIAGNOSIS — M17.12 PRIMARY OSTEOARTHRITIS OF LEFT KNEE: ICD-10-CM

## 2024-04-01 DIAGNOSIS — M25.562 CHRONIC PAIN OF LEFT KNEE: ICD-10-CM

## 2024-04-01 DIAGNOSIS — Z96.652 S/P TOTAL KNEE REPLACEMENT, LEFT: Primary | ICD-10-CM

## 2024-04-01 PROCEDURE — 99999 PR PBB SHADOW E&M-EST. PATIENT-LVL III: CPT | Mod: PBBFAC,,, | Performed by: NURSE PRACTITIONER

## 2024-04-01 PROCEDURE — 99499 UNLISTED E&M SERVICE: CPT | Mod: S$GLB,,, | Performed by: NURSE PRACTITIONER

## 2024-04-01 RX ORDER — SODIUM CHLORIDE 0.9 % (FLUSH) 0.9 %
10 SYRINGE (ML) INJECTION
Status: CANCELLED | OUTPATIENT
Start: 2024-04-01

## 2024-04-01 RX ORDER — SODIUM CHLORIDE 9 MG/ML
INJECTION, SOLUTION INTRAVENOUS
Status: CANCELLED | OUTPATIENT
Start: 2024-04-01

## 2024-04-01 RX ORDER — SODIUM CHLORIDE 9 MG/ML
INJECTION, SOLUTION INTRAVENOUS CONTINUOUS
Status: CANCELLED | OUTPATIENT
Start: 2024-04-01 | End: 2024-04-02

## 2024-04-01 RX ORDER — OXYCODONE HYDROCHLORIDE 5 MG/1
5 TABLET ORAL
Status: CANCELLED | OUTPATIENT
Start: 2024-04-01

## 2024-04-01 RX ORDER — ONDANSETRON HYDROCHLORIDE 2 MG/ML
4 INJECTION, SOLUTION INTRAVENOUS EVERY 8 HOURS PRN
Status: CANCELLED | OUTPATIENT
Start: 2024-04-01

## 2024-04-01 RX ORDER — ACETAMINOPHEN 500 MG
1000 TABLET ORAL
Status: CANCELLED | OUTPATIENT
Start: 2024-04-01

## 2024-04-01 RX ORDER — AMOXICILLIN 250 MG
1 CAPSULE ORAL 2 TIMES DAILY
Status: CANCELLED | OUTPATIENT
Start: 2024-04-01

## 2024-04-01 RX ORDER — FAMOTIDINE 20 MG/1
20 TABLET, FILM COATED ORAL 2 TIMES DAILY
Status: CANCELLED | OUTPATIENT
Start: 2024-04-01

## 2024-04-01 RX ORDER — ASPIRIN 81 MG/1
81 TABLET ORAL 2 TIMES DAILY
Status: CANCELLED | OUTPATIENT
Start: 2024-04-01

## 2024-04-01 RX ORDER — CEFAZOLIN SODIUM 2 G/50ML
2 SOLUTION INTRAVENOUS
Status: CANCELLED | OUTPATIENT
Start: 2024-04-01

## 2024-04-01 RX ORDER — PROCHLORPERAZINE EDISYLATE 5 MG/ML
5 INJECTION INTRAMUSCULAR; INTRAVENOUS EVERY 6 HOURS PRN
Status: CANCELLED | OUTPATIENT
Start: 2024-04-01

## 2024-04-01 RX ORDER — CEFAZOLIN SODIUM 2 G/50ML
2 SOLUTION INTRAVENOUS
Status: CANCELLED | OUTPATIENT
Start: 2024-04-01 | End: 2024-04-02

## 2024-04-01 RX ORDER — PREGABALIN 75 MG/1
75 CAPSULE ORAL
Status: CANCELLED | OUTPATIENT
Start: 2024-04-01

## 2024-04-01 RX ORDER — BISACODYL 10 MG/1
10 SUPPOSITORY RECTAL EVERY 12 HOURS PRN
Status: CANCELLED | OUTPATIENT
Start: 2024-04-01

## 2024-04-01 RX ORDER — ACETAMINOPHEN 500 MG
1000 TABLET ORAL EVERY 6 HOURS
Status: CANCELLED | OUTPATIENT
Start: 2024-04-01

## 2024-04-01 RX ORDER — METHOCARBAMOL 750 MG/1
750 TABLET, FILM COATED ORAL 3 TIMES DAILY
Status: CANCELLED | OUTPATIENT
Start: 2024-04-01

## 2024-04-01 RX ORDER — OXYCODONE HYDROCHLORIDE 5 MG/1
10 TABLET ORAL
Status: CANCELLED | OUTPATIENT
Start: 2024-04-01

## 2024-04-01 RX ORDER — NALOXONE HCL 0.4 MG/ML
0.02 VIAL (ML) INJECTION
Status: CANCELLED | OUTPATIENT
Start: 2024-04-01

## 2024-04-01 RX ORDER — LIDOCAINE HYDROCHLORIDE 10 MG/ML
1 INJECTION, SOLUTION EPIDURAL; INFILTRATION; INTRACAUDAL; PERINEURAL
Status: CANCELLED | OUTPATIENT
Start: 2024-04-01

## 2024-04-01 RX ORDER — MUPIROCIN 20 MG/G
1 OINTMENT TOPICAL
Status: CANCELLED | OUTPATIENT
Start: 2024-04-01

## 2024-04-01 RX ORDER — POLYETHYLENE GLYCOL 3350 17 G/17G
17 POWDER, FOR SOLUTION ORAL DAILY
Status: CANCELLED | OUTPATIENT
Start: 2024-04-01

## 2024-04-01 RX ORDER — PREGABALIN 75 MG/1
75 CAPSULE ORAL NIGHTLY
Status: CANCELLED | OUTPATIENT
Start: 2024-04-01

## 2024-04-01 RX ORDER — CELECOXIB 200 MG/1
400 CAPSULE ORAL ONCE
Status: CANCELLED | OUTPATIENT
Start: 2024-04-01 | End: 2024-04-01

## 2024-04-01 RX ORDER — FENTANYL CITRATE 50 UG/ML
100 INJECTION, SOLUTION INTRAMUSCULAR; INTRAVENOUS
Status: CANCELLED | OUTPATIENT
Start: 2024-04-01 | End: 2024-04-02

## 2024-04-01 RX ORDER — MIDAZOLAM HYDROCHLORIDE 1 MG/ML
4 INJECTION, SOLUTION INTRAMUSCULAR; INTRAVENOUS
Status: CANCELLED | OUTPATIENT
Start: 2024-04-01 | End: 2024-04-02

## 2024-04-01 NOTE — H&P
CC:  Left knee pain    Esa Yang is a 54 y.o. male with history of Left knee pain. Pain is worse with activity and weight bearing.  Patient has experienced interference of activities of daily living due to decreased range of motion and an increase in joint pain and swelling.  Patient has failed non-operative treatment including NSAIDs, corticosteroid injections, viscosupplement injections, and activity modification.  Esa Yang currently ambulates independently.     Relevant medical conditions of significance in perioperative period:  BPH- on medication managed by pcp      Past Medical History:   Diagnosis Date    Allergy     Appendicitis 02/13/2023    Arthritis     Back pain     Chest pain 11/14/2014    Fatigue 09/19/2018    GERD (gastroesophageal reflux disease)     Hesitancy 05/22/2018    Hyperlipidemia     Low testosterone in male 05/22/2018    Nausea and vomiting 02/13/2023    Palpitations 11/14/2014    Post-void dribbling 05/22/2018    S/P ACL reconstruction 08/24/2023    S/P appendectomy 02/28/2023    S/P hernia repair 02/28/2023    S/P L knee surgery, arthroscopic medial/lateral menisectomy, loose body removal (12/31/14) 01/14/2015    Slow urinary stream 05/22/2018    Umbilical hernia without obstruction and without gangrene 02/13/2023       Past Surgical History:   Procedure Laterality Date    ANKLE SURGERY Left     ARTHROSCOPIC REPAIR OF ROTATOR CUFF OF SHOULDER Left 12/16/2022    Procedure: REPAIR, ROTATOR CUFF, ARTHROSCOPIC;  Surgeon: Montana Russ Jr., MD;  Location: [facility] OR;  Service: Orthopedics;  Laterality: Left;  need opus system [employer] notified cc    ARTHROSCOPY OF SHOULDER WITH DECOMPRESSION OF SUBACROMIAL SPACE Left 12/16/2022    Procedure: ARTHROSCOPY, SHOULDER, WITH SUBACROMIAL SPACE DECOMPRESSION;  Surgeon: Montana Russ Jr., MD;  Location: [facility] OR;  Service: Orthopedics;  Laterality: Left;    BACK SURGERY  01/30/2017    INJECTION, TENDON SHEATH OR LIGAMENT, 1 TENDON  SHEATH OR LIGAMENT Right 12/16/2022    Procedure: INJECTION,TENDON SHEATH OR LIGAMENT,1 TENDON SHEATH OR LIGAMENT;  Surgeon: Montana Russ Jr., MD;  Location: [facility] OR;  Service: Orthopedics;  Laterality: Right;    KNEE SURGERY      x 5    LAPAROSCOPIC APPENDECTOMY N/A 2/13/2023    Procedure: APPENDECTOMY, LAPAROSCOPIC;  Surgeon: Babs Cano MD;  Location: [facility] OR;  Service: General;  Laterality: N/A;    REPAIR, HERNIA, UMBILICAL  2/13/2023    Procedure: REPAIR, HERNIA, UMBILICAL;  Surgeon: Babs Cano MD;  Location: [facility] OR;  Service: General;;    VASECTOMY         Family History   Problem Relation Age of Onset    Hypertension Father     Prostate cancer Father     Kidney cancer Neg Hx        Review of patient's allergies indicates:   Allergen Reactions    Sulfa (sulfonamide antibiotics) Swelling    Neosporin (neomycin-polymyx) Rash     Topical irritation     Aspirin Swelling    Latex, natural rubber Hives and Itching    Shellfish containing products Swelling         Current Outpatient Medications:     acetaminophen (TYLENOL) 650 MG TbSR, Take 1 tablet (650 mg total) by mouth every 8 (eight) hours., Disp: 120 tablet, Rfl: 0    apixaban (ELIQUIS) 2.5 mg Tab, Take 1 tablet (2.5 mg total) by mouth 2 (two) times daily., Disp: 60 tablet, Rfl: 0    celecoxib (CELEBREX) 200 MG capsule, Take 1 capsule (200 mg total) by mouth once daily., Disp: 30 capsule, Rfl: 0    gabapentin (NEURONTIN) 100 MG capsule, Take 100 mg by mouth 3 (three) times daily., Disp: , Rfl:     meloxicam (MOBIC) 15 MG tablet, TAKE 1 TABLET(15 MG) BY MOUTH DAILY AS NEEDED FOR PAIN, Disp: 30 tablet, Rfl: 1    methocarbamoL (ROBAXIN) 750 MG Tab, Take 1 tablet (750 mg total) by mouth 4 (four) times daily as needed (muscle spasms)., Disp: 40 tablet, Rfl: 0    oxyCODONE (ROXICODONE) 5 MG immediate release tablet, Take 1-2 tabs every 4-6 hours as needed for pain, Disp: 50 tablet, Rfl: 0    pantoprazole (PROTONIX) 40 MG tablet, Take 1 tablet  (40 mg total) by mouth once daily., Disp: 30 tablet, Rfl: 0    pep injection, Inject .25 ml as directed   For compounding pharmacy use:  Add PAPAVERINE 30 mcg Add PHENTOLAMINE 10 mg Add ALPROSTADIL 100 mcg, Disp: 2 vial, Rfl: 11    senna-docusate 8.6-50 mg (SENNA WITH DOCUSATE SODIUM) 8.6-50 mg per tablet, Take 1 tablet by mouth once daily., Disp: 30 tablet, Rfl: 0    tamsulosin (FLOMAX) 0.4 mg Cap, Take 1 capsule (0.4 mg total) by mouth once daily. (Patient taking differently: Take 0.4 mg by mouth once daily. Pt still taking), Disp: 30 capsule, Rfl: 11    traMADoL (ULTRAM) 50 mg tablet, Take 1 tablet (50 mg total) by mouth every 8 (eight) hours as needed for Pain., Disp: 20 tablet, Rfl: 0  No current facility-administered medications for this visit.    Facility-Administered Medications Ordered in Other Visits:     triamcinolone acetonide injection 40 mg, 40 mg, Intra-articular, , Sergey Kruse MD, 40 mg at 11/11/14 1327    Review of Systems:  Constitutional: no fever or chills  Eyes: no visual changes  ENT: no nasal congestion or sore throat  Respiratory: no cough or shortness of breath  Cardiovascular: no chest pain or palpitations  Gastrointestinal: no nausea or vomiting, tolerating diet  Genitourinary: no hematuria or dysuria  Integument/Breast: no rash or pruritis  Hematologic/Lymphatic: no easy bruising or lymphadenopathy  Musculoskeletal: positive for knee pain  Neurological: no seizures or tremors  Behavioral/Psych: no auditory or visual hallucinations  Endocrine: no heat or cold intolerance    PE:  There were no vitals taken for this visit.  General: Pleasant, cooperative, NAD   Gait: antalgic  HEENT: NCAT, sclera nonicteric   Lungs: Respirations clear bilaterally; equal and unlabored.   CV: S1S2; 2+ bilateral upper and lower extremity pulses.   Skin: Intact throughout with no rashes, erythema, or lesions  Extremities: No LE edema,  no erythema or warmth of the skin in either lower  extremity.    Left knee exam:  Knee Range of Motion:normal, pain with passive range of motion  Effusion:none  Condition of skin:intact  Location of tenderness:Medial joint line   Strength:normal  Stability: stable to testing    Hip Examination: painless PROM of hip     Radiographs: Radiographs reveal advanced degenerative changes including subchondral cyst formation, subchondral sclerosis, osteophyte formation, joint space narrowing.     Knee Alignment: normal    Diagnosis: Primary osteoarthritis Left knee    Plan: Left total knee arthroplasty    Due to the serious nature of total joint infection and the high prevalence of community acquired MRSA, vancomycin will be used perioperatively.

## 2024-04-01 NOTE — PROGRESS NOTES
Esa Yang is a 54 y.o. year old here today for preoperative visit in preparation for a left total knee arthroplasty to be performed by Dr. Ortiz on 3/1/24.  he was last seen and treated in the clinic on 3/1/2024. he will be medically optimized by the pre op center. There has been no significant change in medical status since last visit. No fever, chills, malaise, or unexplained weight change.      Allergies, Medications, past medical and surgical history reviewed. He was previously scheduled for surgery in March, but it was rescheduled due to a sinus infection.    Focused examination performed.    Patient declined to see surgeon today. All questions answered. Patient encouraged to call with questions. Contact information given.     Pre, natalia, and post operative procedures and expectations discussed. Goals of successful surgery reviewed and include manageable pain levels, surgical site free of infection, medication management, and ambulation with PT/OT assistance. Healthy weight management discussed with patient and caregiver who were receptive to eduction of healthy diet and activity. No other necessary lifestyle changes identified. Educated patient about signs and symptoms of infection, medication management, anticoagulation therapy, risk of tobacco and alcohol use, and self-care to promote healing. Surgical guide given for future reference. Hibiclens given to patient with instructions. All questions were answered.     Esa Yang verbalized an understanding to the education and goals. Patient has displayed readiness to engage in care and is ready to proceed with surgery.  Patient reports his wife is able and ready to provide assistance at home after discharge.    Surgical and blood consents signed.    DME will be arranged. The mobility limitation cannot be sufficiently resolved by the use of a cane. Patient's functional mobility deficit can be sufficiently resolved with the use of a (Rolling  "Walker or Walker). Patient's mobility limitation significantly impairs their ability to participate in one of more activities of daily living. The use of a (Rolling Walker or Walker) will significantly improve the patient's ability to participate in MRADLS and the patient will use it on regular basis in the home."     Esa Yang will contact us if there are any questions, concerns, or changes in medical status prior to surgery.       Patient has discussed discharge planning with surgeon. Patient will be discharged to home following surgery.   patient will be scheduled with [facility]. New PT orders entered for scheduling.    30 minutes of time was spent on patient education, review of records, templating, H&P, , appointment scheduling and optimizing patient for surgery.      "

## 2024-04-01 NOTE — H&P (VIEW-ONLY)
CC:  Left knee pain    Esa Yang is a 54 y.o. male with history of Left knee pain. Pain is worse with activity and weight bearing.  Patient has experienced interference of activities of daily living due to decreased range of motion and an increase in joint pain and swelling.  Patient has failed non-operative treatment including NSAIDs, corticosteroid injections, viscosupplement injections, and activity modification.  Esa Yang currently ambulates independently.     Relevant medical conditions of significance in perioperative period:  BPH- on medication managed by pcp      Past Medical History:   Diagnosis Date    Allergy     Appendicitis 02/13/2023    Arthritis     Back pain     Chest pain 11/14/2014    Fatigue 09/19/2018    GERD (gastroesophageal reflux disease)     Hesitancy 05/22/2018    Hyperlipidemia     Low testosterone in male 05/22/2018    Nausea and vomiting 02/13/2023    Palpitations 11/14/2014    Post-void dribbling 05/22/2018    S/P ACL reconstruction 08/24/2023    S/P appendectomy 02/28/2023    S/P hernia repair 02/28/2023    S/P L knee surgery, arthroscopic medial/lateral menisectomy, loose body removal (12/31/14) 01/14/2015    Slow urinary stream 05/22/2018    Umbilical hernia without obstruction and without gangrene 02/13/2023       Past Surgical History:   Procedure Laterality Date    ANKLE SURGERY Left     ARTHROSCOPIC REPAIR OF ROTATOR CUFF OF SHOULDER Left 12/16/2022    Procedure: REPAIR, ROTATOR CUFF, ARTHROSCOPIC;  Surgeon: Montana Russ Jr., MD;  Location: [facility] OR;  Service: Orthopedics;  Laterality: Left;  need opus system [employer] notified cc    ARTHROSCOPY OF SHOULDER WITH DECOMPRESSION OF SUBACROMIAL SPACE Left 12/16/2022    Procedure: ARTHROSCOPY, SHOULDER, WITH SUBACROMIAL SPACE DECOMPRESSION;  Surgeon: Montana Russ Jr., MD;  Location: [facility] OR;  Service: Orthopedics;  Laterality: Left;    BACK SURGERY  01/30/2017    INJECTION, TENDON SHEATH OR LIGAMENT, 1 TENDON  SHEATH OR LIGAMENT Right 12/16/2022    Procedure: INJECTION,TENDON SHEATH OR LIGAMENT,1 TENDON SHEATH OR LIGAMENT;  Surgeon: Montana Russ Jr., MD;  Location: [facility] OR;  Service: Orthopedics;  Laterality: Right;    KNEE SURGERY      x 5    LAPAROSCOPIC APPENDECTOMY N/A 2/13/2023    Procedure: APPENDECTOMY, LAPAROSCOPIC;  Surgeon: Babs Cano MD;  Location: [facility] OR;  Service: General;  Laterality: N/A;    REPAIR, HERNIA, UMBILICAL  2/13/2023    Procedure: REPAIR, HERNIA, UMBILICAL;  Surgeon: Babs Cano MD;  Location: [facility] OR;  Service: General;;    VASECTOMY         Family History   Problem Relation Age of Onset    Hypertension Father     Prostate cancer Father     Kidney cancer Neg Hx        Review of patient's allergies indicates:   Allergen Reactions    Sulfa (sulfonamide antibiotics) Swelling    Neosporin (neomycin-polymyx) Rash     Topical irritation     Aspirin Swelling    Latex, natural rubber Hives and Itching    Shellfish containing products Swelling         Current Outpatient Medications:     acetaminophen (TYLENOL) 650 MG TbSR, Take 1 tablet (650 mg total) by mouth every 8 (eight) hours., Disp: 120 tablet, Rfl: 0    apixaban (ELIQUIS) 2.5 mg Tab, Take 1 tablet (2.5 mg total) by mouth 2 (two) times daily., Disp: 60 tablet, Rfl: 0    celecoxib (CELEBREX) 200 MG capsule, Take 1 capsule (200 mg total) by mouth once daily., Disp: 30 capsule, Rfl: 0    gabapentin (NEURONTIN) 100 MG capsule, Take 100 mg by mouth 3 (three) times daily., Disp: , Rfl:     meloxicam (MOBIC) 15 MG tablet, TAKE 1 TABLET(15 MG) BY MOUTH DAILY AS NEEDED FOR PAIN, Disp: 30 tablet, Rfl: 1    methocarbamoL (ROBAXIN) 750 MG Tab, Take 1 tablet (750 mg total) by mouth 4 (four) times daily as needed (muscle spasms)., Disp: 40 tablet, Rfl: 0    oxyCODONE (ROXICODONE) 5 MG immediate release tablet, Take 1-2 tabs every 4-6 hours as needed for pain, Disp: 50 tablet, Rfl: 0    pantoprazole (PROTONIX) 40 MG tablet, Take 1 tablet  (40 mg total) by mouth once daily., Disp: 30 tablet, Rfl: 0    pep injection, Inject .25 ml as directed   For compounding pharmacy use:  Add PAPAVERINE 30 mcg Add PHENTOLAMINE 10 mg Add ALPROSTADIL 100 mcg, Disp: 2 vial, Rfl: 11    senna-docusate 8.6-50 mg (SENNA WITH DOCUSATE SODIUM) 8.6-50 mg per tablet, Take 1 tablet by mouth once daily., Disp: 30 tablet, Rfl: 0    tamsulosin (FLOMAX) 0.4 mg Cap, Take 1 capsule (0.4 mg total) by mouth once daily. (Patient taking differently: Take 0.4 mg by mouth once daily. Pt still taking), Disp: 30 capsule, Rfl: 11    traMADoL (ULTRAM) 50 mg tablet, Take 1 tablet (50 mg total) by mouth every 8 (eight) hours as needed for Pain., Disp: 20 tablet, Rfl: 0  No current facility-administered medications for this visit.    Facility-Administered Medications Ordered in Other Visits:     triamcinolone acetonide injection 40 mg, 40 mg, Intra-articular, , Sergey Kruse MD, 40 mg at 11/11/14 1327    Review of Systems:  Constitutional: no fever or chills  Eyes: no visual changes  ENT: no nasal congestion or sore throat  Respiratory: no cough or shortness of breath  Cardiovascular: no chest pain or palpitations  Gastrointestinal: no nausea or vomiting, tolerating diet  Genitourinary: no hematuria or dysuria  Integument/Breast: no rash or pruritis  Hematologic/Lymphatic: no easy bruising or lymphadenopathy  Musculoskeletal: positive for knee pain  Neurological: no seizures or tremors  Behavioral/Psych: no auditory or visual hallucinations  Endocrine: no heat or cold intolerance    PE:  There were no vitals taken for this visit.  General: Pleasant, cooperative, NAD   Gait: antalgic  HEENT: NCAT, sclera nonicteric   Lungs: Respirations clear bilaterally; equal and unlabored.   CV: S1S2; 2+ bilateral upper and lower extremity pulses.   Skin: Intact throughout with no rashes, erythema, or lesions  Extremities: No LE edema,  no erythema or warmth of the skin in either lower  extremity.    Left knee exam:  Knee Range of Motion:normal, pain with passive range of motion  Effusion:none  Condition of skin:intact  Location of tenderness:Medial joint line   Strength:normal  Stability: stable to testing    Hip Examination: painless PROM of hip     Radiographs: Radiographs reveal advanced degenerative changes including subchondral cyst formation, subchondral sclerosis, osteophyte formation, joint space narrowing.     Knee Alignment: normal    Diagnosis: Primary osteoarthritis Left knee    Plan: Left total knee arthroplasty    Due to the serious nature of total joint infection and the high prevalence of community acquired MRSA, vancomycin will be used perioperatively.

## 2024-04-08 DIAGNOSIS — Z96.652 S/P TOTAL KNEE REPLACEMENT, LEFT: Primary | ICD-10-CM

## 2024-04-08 RX ORDER — AMOXICILLIN 250 MG
1 CAPSULE ORAL DAILY
Qty: 30 TABLET | Refills: 0 | Status: SHIPPED | OUTPATIENT
Start: 2024-04-08

## 2024-04-08 RX ORDER — CELECOXIB 200 MG/1
200 CAPSULE ORAL DAILY
Qty: 30 CAPSULE | Refills: 0 | Status: ON HOLD | OUTPATIENT
Start: 2024-04-08 | End: 2024-04-11 | Stop reason: HOSPADM

## 2024-04-08 RX ORDER — METHOCARBAMOL 750 MG/1
750 TABLET, FILM COATED ORAL 4 TIMES DAILY PRN
Qty: 40 TABLET | Refills: 0 | Status: SHIPPED | OUTPATIENT
Start: 2024-04-08 | End: 2024-04-30

## 2024-04-08 RX ORDER — PANTOPRAZOLE SODIUM 40 MG/1
40 TABLET, DELAYED RELEASE ORAL DAILY
Qty: 30 TABLET | Refills: 0 | Status: SHIPPED | OUTPATIENT
Start: 2024-04-08

## 2024-04-08 RX ORDER — OXYCODONE HYDROCHLORIDE 5 MG/1
TABLET ORAL
Qty: 50 TABLET | Refills: 0 | Status: SHIPPED | OUTPATIENT
Start: 2024-04-08

## 2024-04-08 RX ORDER — DEXTROMETHORPHAN HYDROBROMIDE, GUAIFENESIN 5; 100 MG/5ML; MG/5ML
650 LIQUID ORAL EVERY 8 HOURS
Qty: 120 TABLET | Refills: 0 | Status: SHIPPED | OUTPATIENT
Start: 2024-04-08

## 2024-04-10 ENCOUNTER — ANESTHESIA EVENT (OUTPATIENT)
Dept: SURGERY | Facility: HOSPITAL | Age: 54
End: 2024-04-10
Payer: COMMERCIAL

## 2024-04-10 ENCOUNTER — TELEPHONE (OUTPATIENT)
Dept: ORTHOPEDICS | Facility: CLINIC | Age: 54
End: 2024-04-10
Payer: COMMERCIAL

## 2024-04-10 DIAGNOSIS — G89.29 CHRONIC PAIN OF LEFT KNEE: ICD-10-CM

## 2024-04-10 DIAGNOSIS — M17.32 POST-TRAUMATIC OSTEOARTHRITIS OF LEFT KNEE: ICD-10-CM

## 2024-04-10 DIAGNOSIS — M25.562 CHRONIC PAIN OF LEFT KNEE: ICD-10-CM

## 2024-04-10 RX ORDER — MELOXICAM 15 MG/1
15 TABLET ORAL DAILY PRN
Qty: 30 TABLET | Refills: 1 | Status: ON HOLD | OUTPATIENT
Start: 2024-04-10 | End: 2024-04-11 | Stop reason: HOSPADM

## 2024-04-10 NOTE — ANESTHESIA PREPROCEDURE EVALUATION
04/10/2024  Pre-operative evaluation for Procedure(s) (LRB):  ARTHROPLASTY, KNEE, TOTAL, MADY COMPUTER-ASSISTED NAVIGATION: LEFT: SAME DAY (Left)    Esa Yang is a 54 y.o. male   -Per patient, hx of post-dural puncture headache s/p spinal surgery for ankle surgery, resolved with blood patch.   -Hx of SJS w/ bactrim     Patient Active Problem List   Diagnosis    Knee pain    Medial meniscus tear    Loose body in knee    Chondromalacia, knee    Nocturia    Benign prostatic hyperplasia with urinary frequency    Trigger middle finger of right hand    Nontraumatic incomplete tear of left rotator cuff    Post-traumatic osteoarthritis of left knee    Pre-diabetes       Review of patient's allergies indicates:   Allergen Reactions    Sulfa (sulfonamide antibiotics) Swelling    Neosporin (neomycin-polymyx) Rash     Topical irritation     Aspirin Swelling    Latex, natural rubber Hives and Itching    Shellfish containing products Swelling       Current Facility-Administered Medications on File Prior to Encounter   Medication Dose Route Frequency Provider Last Rate Last Admin    triamcinolone acetonide injection 40 mg  40 mg Intra-articular  Sergey Kruse MD   40 mg at 11/11/14 1327     Current Outpatient Medications on File Prior to Encounter   Medication Sig Dispense Refill    acetaminophen (TYLENOL) 650 MG TbSR Take 1 tablet (650 mg total) by mouth every 8 (eight) hours. 120 tablet 0    apixaban (ELIQUIS) 2.5 mg Tab Take 1 tablet (2.5 mg total) by mouth 2 (two) times daily. 60 tablet 0    celecoxib (CELEBREX) 200 MG capsule Take 1 capsule (200 mg total) by mouth once daily. 30 capsule 0    gabapentin (NEURONTIN) 100 MG capsule Take 100 mg by mouth 3 (three) times daily.      oxyCODONE (ROXICODONE) 5 MG immediate release tablet Take 1-2 tabs every 4-6 hours as needed for pain 50 tablet 0     pantoprazole (PROTONIX) 40 MG tablet Take 1 tablet (40 mg total) by mouth once daily. 30 tablet 0    pep injection Inject .25 ml as directed     For compounding pharmacy use:   Add PAPAVERINE 30 mcg  Add PHENTOLAMINE 10 mg  Add ALPROSTADIL 100 mcg 2 vial 11    senna-docusate 8.6-50 mg (SENNA WITH DOCUSATE SODIUM) 8.6-50 mg per tablet Take 1 tablet by mouth once daily. 30 tablet 0    tamsulosin (FLOMAX) 0.4 mg Cap Take 1 capsule (0.4 mg total) by mouth once daily. (Patient taking differently: Take 0.4 mg by mouth once daily. Pt still taking) 30 capsule 11    traMADoL (ULTRAM) 50 mg tablet Take 1 tablet (50 mg total) by mouth every 8 (eight) hours as needed for Pain. 20 tablet 0       Past Surgical History:   Procedure Laterality Date    ANKLE SURGERY Left     ARTHROSCOPIC REPAIR OF ROTATOR CUFF OF SHOULDER Left 12/16/2022    Procedure: REPAIR, ROTATOR CUFF, ARTHROSCOPIC;  Surgeon: Montana Russ Jr., MD;  Location: [facility] OR;  Service: Orthopedics;  Laterality: Left;  need opus system [employer] notified cc    ARTHROSCOPY OF SHOULDER WITH DECOMPRESSION OF SUBACROMIAL SPACE Left 12/16/2022    Procedure: ARTHROSCOPY, SHOULDER, WITH SUBACROMIAL SPACE DECOMPRESSION;  Surgeon: Montana Russ Jr., MD;  Location: [facility] OR;  Service: Orthopedics;  Laterality: Left;    BACK SURGERY  01/30/2017    INJECTION, TENDON SHEATH OR LIGAMENT, 1 TENDON SHEATH OR LIGAMENT Right 12/16/2022    Procedure: INJECTION,TENDON SHEATH OR LIGAMENT,1 TENDON SHEATH OR LIGAMENT;  Surgeon: Montana Russ Jr., MD;  Location: [facility] OR;  Service: Orthopedics;  Laterality: Right;    KNEE SURGERY      x 5    LAPAROSCOPIC APPENDECTOMY N/A 2/13/2023    Procedure: APPENDECTOMY, LAPAROSCOPIC;  Surgeon: Babs Cano MD;  Location: [facility] OR;  Service: General;  Laterality: N/A;    REPAIR, HERNIA, UMBILICAL  2/13/2023    Procedure: REPAIR, HERNIA, UMBILICAL;  Surgeon: Babs Cano MD;  Location: [facility] OR;  Service: General;;    VASECTOMY          Social History     Socioeconomic History    Marital status:    Occupational History    Occupation:      Employer: [employer]   Tobacco Use    Smoking status: Some Days     Types: Cigars    Smokeless tobacco: Never    Tobacco comments:     Cigar every once a while   Substance and Sexual Activity    Alcohol use: Yes     Alcohol/week: 2.0 standard drinks of alcohol     Types: 2 Cans of beer per week     Comment: 2/wk    Drug use: No    Sexual activity: Yes     Social Determinants of Health     Financial Resource Strain: Low Risk  (2/5/2024)    Overall Financial Resource Strain (CARDIA)     Difficulty of Paying Living Expenses: Not very hard   Food Insecurity: No Food Insecurity (2/5/2024)    Hunger Vital Sign     Worried About Running Out of Food in the Last Year: Never true     Ran Out of Food in the Last Year: Never true   Transportation Needs: No Transportation Needs (2/5/2024)    PRAPARE - Transportation     Lack of Transportation (Medical): No     Lack of Transportation (Non-Medical): No   Physical Activity: Unknown (2/5/2024)    Exercise Vital Sign     Days of Exercise per Week: 1 day     Minutes of Exercise per Session: Patient declined   Stress: No Stress Concern Present (2/5/2024)    [place] [employer] - Occupational Stress Questionnaire     Feeling of Stress : Only a little   Social Connections: Unknown (2/5/2024)    Social Connection and Isolation Panel [NHANES]     Frequency of Communication with Friends and Family: More than three times a week     Frequency of Social Gatherings with Friends and Family: Once a week     Active Member of Clubs or Organizations: Yes     Attends Club or Organization Meetings: More than 4 times per year     Marital Status:    Housing Stability: Low Risk  (2/5/2024)    Housing Stability Vital Sign     Unable to Pay for Housing in the Last Year: No     Number of Places Lived in the Last Year: 1     Unstable Housing in the  "Last Year: No         CBC: No results for input(s): "WBC", "RBC", "HGB", "HCT", "PLT", "MCV", "MCH", "MCHC" in the last 72 hours.    CMP: No results for input(s): "NA", "K", "CL", "CO2", "BUN", "CREATININE", "GLU", "MG", "PHOS", "CALCIUM", "ALBUMIN", "PROT", "ALKPHOS", "ALT", "AST", "BILITOT" in the last 72 hours.    INR  No results for input(s): "PT", "INR", "PROTIME", "APTT" in the last 72 hours.        Diagnostic Studies:      EKD Echo:  No results found for this or any previous visit.       Pre-op Assessment    I have reviewed the Patient Summary Reports.     I have reviewed the Nursing Notes. I have reviewed the NPO Status.   I have reviewed the Medications.     Review of Systems  Hepatic/GI:     GERD             Musculoskeletal:  Arthritis                   Physical Exam  General: Well nourished and Cooperative    Airway:  Mallampati: II   Mouth Opening: Normal  TM Distance: Normal  Tongue: Normal  Neck ROM: Normal ROM    Chest/Lungs:  Clear to auscultation, Normal Respiratory Rate    Heart:  Rate: Normal  Rhythm: Regular Rhythm  Sounds: Normal        Anesthesia Plan  Type of Anesthesia, risks & benefits discussed:    Anesthesia Type: Gen ETT, Spinal, Regional, Gen Natural Airway  Intra-op Monitoring Plan: Standard ASA Monitors  Post Op Pain Control Plan: multimodal analgesia and IV/PO Opioids PRN  Induction:  IV  Airway Plan: Direct and Video, Post-Induction  Informed Consent: Informed consent signed with the Patient and all parties understand the risks and agree with anesthesia plan.  All questions answered.   ASA Score: 2    Ready For Surgery From Anesthesia Perspective.     .      "

## 2024-04-10 NOTE — TELEPHONE ENCOUNTER
I called the patient today regarding surgery on 04/11/2024 with Dr. Romeo Ortiz. I informed the patient that his surgery will be at  [facility] ([facility] [place] [address]). I informed the patient they must arrive at 5:00am and their surgery will start at 7:00am.     Per the [facility] COVID-19 Pre-Procedural Testing Policy (administered 10/26/2022), patients do not need tested for COVID-19 regardless of vaccination status.    I reminded the patient that they cannot eat or drink after midnight, the night before surgery.      I reminded the patient to be careful of their skin over the next few days to make sure they do not get any cuts, scratches or scrapes.    The patient verbalized that they have received their walker, bedside commode and shower chair from [facility].    The patient verbalized understanding and has no further questions.

## 2024-04-11 ENCOUNTER — HOSPITAL ENCOUNTER (OUTPATIENT)
Facility: HOSPITAL | Age: 54
Discharge: HOME | End: 2024-04-11
Attending: ORTHOPAEDIC SURGERY | Admitting: ORTHOPAEDIC SURGERY
Payer: COMMERCIAL

## 2024-04-11 ENCOUNTER — ANESTHESIA (OUTPATIENT)
Dept: SURGERY | Facility: HOSPITAL | Age: 54
End: 2024-04-11
Payer: COMMERCIAL

## 2024-04-11 VITALS
TEMPERATURE: 98 F | HEIGHT: 72 IN | WEIGHT: 263 LBS | OXYGEN SATURATION: 98 % | DIASTOLIC BLOOD PRESSURE: 81 MMHG | HEART RATE: 77 BPM | SYSTOLIC BLOOD PRESSURE: 139 MMHG | RESPIRATION RATE: 13 BRPM | BODY MASS INDEX: 35.62 KG/M2

## 2024-04-11 DIAGNOSIS — Z96.652 S/P TOTAL KNEE REPLACEMENT, LEFT: ICD-10-CM

## 2024-04-11 DIAGNOSIS — M17.12 PRIMARY OSTEOARTHRITIS OF LEFT KNEE: ICD-10-CM

## 2024-04-11 PROCEDURE — 25000003 PHARM REV CODE 250: Performed by: STUDENT IN AN ORGANIZED HEALTH CARE EDUCATION/TRAINING PROGRAM

## 2024-04-11 PROCEDURE — D9220A PRA ANESTHESIA: Mod: CRNA,,, | Performed by: NURSE ANESTHETIST, CERTIFIED REGISTERED

## 2024-04-11 PROCEDURE — 25000003 PHARM REV CODE 250: Performed by: NURSE PRACTITIONER

## 2024-04-11 PROCEDURE — 25000003 PHARM REV CODE 250: Performed by: NURSE ANESTHETIST, CERTIFIED REGISTERED

## 2024-04-11 PROCEDURE — 99900035 HC TECH TIME PER 15 MIN (STAT)

## 2024-04-11 PROCEDURE — 37000009 HC ANESTHESIA EA ADD 15 MINS: Performed by: ORTHOPAEDIC SURGERY

## 2024-04-11 PROCEDURE — 97162 PT EVAL MOD COMPLEX 30 MIN: CPT

## 2024-04-11 PROCEDURE — 97116 GAIT TRAINING THERAPY: CPT

## 2024-04-11 PROCEDURE — 25000003 PHARM REV CODE 250: Performed by: ORTHOPAEDIC SURGERY

## 2024-04-11 PROCEDURE — 71000033 HC RECOVERY, INTIAL HOUR: Performed by: ORTHOPAEDIC SURGERY

## 2024-04-11 PROCEDURE — 97165 OT EVAL LOW COMPLEX 30 MIN: CPT

## 2024-04-11 PROCEDURE — 97535 SELF CARE MNGMENT TRAINING: CPT

## 2024-04-11 PROCEDURE — 36000710: Performed by: ORTHOPAEDIC SURGERY

## 2024-04-11 PROCEDURE — 37000008 HC ANESTHESIA 1ST 15 MINUTES: Performed by: ORTHOPAEDIC SURGERY

## 2024-04-11 PROCEDURE — 27447 TOTAL KNEE ARTHROPLASTY: CPT | Mod: LT,,, | Performed by: ORTHOPAEDIC SURGERY

## 2024-04-11 PROCEDURE — 71000039 HC RECOVERY, EACH ADD'L HOUR: Performed by: ORTHOPAEDIC SURGERY

## 2024-04-11 PROCEDURE — 64448 NJX AA&/STRD FEM NRV NFS IMG: CPT | Mod: 59,LT | Performed by: STUDENT IN AN ORGANIZED HEALTH CARE EDUCATION/TRAINING PROGRAM

## 2024-04-11 PROCEDURE — 63600175 PHARM REV CODE 636 W HCPCS: Performed by: PHYSICIAN ASSISTANT

## 2024-04-11 PROCEDURE — 63600175 PHARM REV CODE 636 W HCPCS: Performed by: ANESTHESIOLOGY

## 2024-04-11 PROCEDURE — 27100025 HC TUBING, SET FLUID WARMER: Performed by: STUDENT IN AN ORGANIZED HEALTH CARE EDUCATION/TRAINING PROGRAM

## 2024-04-11 PROCEDURE — 36000711: Performed by: ORTHOPAEDIC SURGERY

## 2024-04-11 PROCEDURE — D9220A PRA ANESTHESIA: Mod: ANES,,, | Performed by: STUDENT IN AN ORGANIZED HEALTH CARE EDUCATION/TRAINING PROGRAM

## 2024-04-11 PROCEDURE — 25000003 PHARM REV CODE 250: Performed by: ANESTHESIOLOGY

## 2024-04-11 PROCEDURE — 63600175 PHARM REV CODE 636 W HCPCS: Performed by: NURSE PRACTITIONER

## 2024-04-11 PROCEDURE — 63600175 PHARM REV CODE 636 W HCPCS: Performed by: STUDENT IN AN ORGANIZED HEALTH CARE EDUCATION/TRAINING PROGRAM

## 2024-04-11 PROCEDURE — 63600175 PHARM REV CODE 636 W HCPCS: Performed by: NURSE ANESTHETIST, CERTIFIED REGISTERED

## 2024-04-11 PROCEDURE — 63600175 PHARM REV CODE 636 W HCPCS: Performed by: ORTHOPAEDIC SURGERY

## 2024-04-11 PROCEDURE — 0055T BONE SRGRY CMPTR CT/MRI IMAG: CPT | Mod: ,,, | Performed by: ORTHOPAEDIC SURGERY

## 2024-04-11 PROCEDURE — C1776 JOINT DEVICE (IMPLANTABLE): HCPCS | Performed by: ORTHOPAEDIC SURGERY

## 2024-04-11 PROCEDURE — 94761 N-INVAS EAR/PLS OXIMETRY MLT: CPT

## 2024-04-11 PROCEDURE — 97530 THERAPEUTIC ACTIVITIES: CPT

## 2024-04-11 PROCEDURE — 71000015 HC POSTOP RECOV 1ST HR: Performed by: ORTHOPAEDIC SURGERY

## 2024-04-11 PROCEDURE — 27201423 OPTIME MED/SURG SUP & DEVICES STERILE SUPPLY: Performed by: ORTHOPAEDIC SURGERY

## 2024-04-11 PROCEDURE — C1713 ANCHOR/SCREW BN/BN,TIS/BN: HCPCS | Performed by: ORTHOPAEDIC SURGERY

## 2024-04-11 DEVICE — PRIMARY TIBIAL BASEPLATE
Type: IMPLANTABLE DEVICE | Site: KNEE | Status: FUNCTIONAL
Brand: TRIATHLON

## 2024-04-11 DEVICE — CRUCIATE RETAINING FEMORAL
Type: IMPLANTABLE DEVICE | Site: KNEE | Status: FUNCTIONAL
Brand: TRIATHLON

## 2024-04-11 DEVICE — TIBIAL BEARING INSERT
Type: IMPLANTABLE DEVICE | Site: KNEE | Status: FUNCTIONAL
Brand: TRIATHLON

## 2024-04-11 DEVICE — CEMENT BONE SMPLX HV GENTMYCN: Type: IMPLANTABLE DEVICE | Site: KNEE | Status: FUNCTIONAL

## 2024-04-11 DEVICE — PATELLA
Type: IMPLANTABLE DEVICE | Site: KNEE | Status: FUNCTIONAL
Brand: TRIATHLON

## 2024-04-11 RX ORDER — HYDROMORPHONE HYDROCHLORIDE 1 MG/ML
0.2 INJECTION, SOLUTION INTRAMUSCULAR; INTRAVENOUS; SUBCUTANEOUS EVERY 5 MIN PRN
Status: COMPLETED | OUTPATIENT
Start: 2024-04-11 | End: 2024-04-11

## 2024-04-11 RX ORDER — PROPOFOL 10 MG/ML
INJECTION, EMULSION INTRAVENOUS CONTINUOUS PRN
Status: DISCONTINUED | OUTPATIENT
Start: 2024-04-11 | End: 2024-04-11

## 2024-04-11 RX ORDER — OXYCODONE HYDROCHLORIDE 5 MG/1
5 TABLET ORAL
Status: DISCONTINUED | OUTPATIENT
Start: 2024-04-11 | End: 2024-04-11 | Stop reason: HOSPADM

## 2024-04-11 RX ORDER — PROPOFOL 10 MG/ML
VIAL (ML) INTRAVENOUS
Status: DISCONTINUED | OUTPATIENT
Start: 2024-04-11 | End: 2024-04-11

## 2024-04-11 RX ORDER — BISACODYL 10 MG/1
10 SUPPOSITORY RECTAL EVERY 12 HOURS PRN
Status: DISCONTINUED | OUTPATIENT
Start: 2024-04-11 | End: 2024-04-11 | Stop reason: HOSPADM

## 2024-04-11 RX ORDER — ACETAMINOPHEN 500 MG
1000 TABLET ORAL EVERY 6 HOURS
Status: DISCONTINUED | OUTPATIENT
Start: 2024-04-11 | End: 2024-04-11 | Stop reason: HOSPADM

## 2024-04-11 RX ORDER — PREGABALIN 75 MG/1
75 CAPSULE ORAL NIGHTLY
Status: DISCONTINUED | OUTPATIENT
Start: 2024-04-11 | End: 2024-04-11 | Stop reason: HOSPADM

## 2024-04-11 RX ORDER — SODIUM CHLORIDE 0.9 % (FLUSH) 0.9 %
10 SYRINGE (ML) INJECTION
Status: DISCONTINUED | OUTPATIENT
Start: 2024-04-11 | End: 2024-04-11 | Stop reason: HOSPADM

## 2024-04-11 RX ORDER — OXYCODONE HYDROCHLORIDE 5 MG/1
10 TABLET ORAL
Status: DISCONTINUED | OUTPATIENT
Start: 2024-04-11 | End: 2024-04-11 | Stop reason: HOSPADM

## 2024-04-11 RX ORDER — DEXAMETHASONE SODIUM PHOSPHATE 4 MG/ML
INJECTION, SOLUTION INTRA-ARTICULAR; INTRALESIONAL; INTRAMUSCULAR; INTRAVENOUS; SOFT TISSUE
Status: DISCONTINUED | OUTPATIENT
Start: 2024-04-11 | End: 2024-04-11

## 2024-04-11 RX ORDER — ONDANSETRON HYDROCHLORIDE 2 MG/ML
INJECTION, SOLUTION INTRAVENOUS
Status: DISCONTINUED | OUTPATIENT
Start: 2024-04-11 | End: 2024-04-11

## 2024-04-11 RX ORDER — FENTANYL CITRATE 50 UG/ML
100 INJECTION, SOLUTION INTRAMUSCULAR; INTRAVENOUS
Status: DISCONTINUED | OUTPATIENT
Start: 2024-04-11 | End: 2024-04-11 | Stop reason: HOSPADM

## 2024-04-11 RX ORDER — LIDOCAINE HYDROCHLORIDE 10 MG/ML
1 INJECTION, SOLUTION EPIDURAL; INFILTRATION; INTRACAUDAL; PERINEURAL
Status: DISCONTINUED | OUTPATIENT
Start: 2024-04-11 | End: 2024-04-11 | Stop reason: HOSPADM

## 2024-04-11 RX ORDER — AMOXICILLIN 250 MG
1 CAPSULE ORAL 2 TIMES DAILY
Status: DISCONTINUED | OUTPATIENT
Start: 2024-04-11 | End: 2024-04-11 | Stop reason: HOSPADM

## 2024-04-11 RX ORDER — ONDANSETRON HYDROCHLORIDE 2 MG/ML
4 INJECTION, SOLUTION INTRAVENOUS EVERY 8 HOURS PRN
Status: DISCONTINUED | OUTPATIENT
Start: 2024-04-11 | End: 2024-04-11 | Stop reason: HOSPADM

## 2024-04-11 RX ORDER — SODIUM CHLORIDE 9 MG/ML
INJECTION, SOLUTION INTRAVENOUS CONTINUOUS
Status: DISCONTINUED | OUTPATIENT
Start: 2024-04-11 | End: 2024-04-11 | Stop reason: HOSPADM

## 2024-04-11 RX ORDER — CARBOXYMETHYLCELLULOSE SODIUM 10 MG/ML
GEL OPHTHALMIC
Status: DISCONTINUED | OUTPATIENT
Start: 2024-04-11 | End: 2024-04-11

## 2024-04-11 RX ORDER — MUPIROCIN 20 MG/G
1 OINTMENT TOPICAL
Status: COMPLETED | OUTPATIENT
Start: 2024-04-11 | End: 2024-04-11

## 2024-04-11 RX ORDER — NALOXONE HCL 0.4 MG/ML
0.02 VIAL (ML) INJECTION
Status: DISCONTINUED | OUTPATIENT
Start: 2024-04-11 | End: 2024-04-11 | Stop reason: HOSPADM

## 2024-04-11 RX ORDER — CELECOXIB 200 MG/1
400 CAPSULE ORAL ONCE
Status: DISCONTINUED | OUTPATIENT
Start: 2024-04-11 | End: 2024-04-11

## 2024-04-11 RX ORDER — FAMOTIDINE 20 MG/1
20 TABLET, FILM COATED ORAL 2 TIMES DAILY
Status: DISCONTINUED | OUTPATIENT
Start: 2024-04-11 | End: 2024-04-11 | Stop reason: HOSPADM

## 2024-04-11 RX ORDER — ROPIVACAINE HYDROCHLORIDE 2 MG/ML
INJECTION, SOLUTION EPIDURAL; INFILTRATION; PERINEURAL CONTINUOUS
Status: DISCONTINUED | OUTPATIENT
Start: 2024-04-11 | End: 2024-04-11 | Stop reason: HOSPADM

## 2024-04-11 RX ORDER — MIDAZOLAM HYDROCHLORIDE 1 MG/ML
4 INJECTION, SOLUTION INTRAMUSCULAR; INTRAVENOUS
Status: DISCONTINUED | OUTPATIENT
Start: 2024-04-11 | End: 2024-04-11 | Stop reason: HOSPADM

## 2024-04-11 RX ORDER — PREGABALIN 75 MG/1
75 CAPSULE ORAL
Status: COMPLETED | OUTPATIENT
Start: 2024-04-11 | End: 2024-04-11

## 2024-04-11 RX ORDER — ASPIRIN 81 MG/1
81 TABLET ORAL 2 TIMES DAILY
Status: DISCONTINUED | OUTPATIENT
Start: 2024-04-11 | End: 2024-04-11

## 2024-04-11 RX ORDER — OXYCODONE HYDROCHLORIDE 5 MG/1
5 TABLET ORAL ONCE
Status: COMPLETED | OUTPATIENT
Start: 2024-04-11 | End: 2024-04-11

## 2024-04-11 RX ORDER — ACETAMINOPHEN 500 MG
1000 TABLET ORAL
Status: COMPLETED | OUTPATIENT
Start: 2024-04-11 | End: 2024-04-11

## 2024-04-11 RX ORDER — LIDOCAINE HYDROCHLORIDE 20 MG/ML
INJECTION INTRAVENOUS
Status: DISCONTINUED | OUTPATIENT
Start: 2024-04-11 | End: 2024-04-11

## 2024-04-11 RX ORDER — PHENYLEPHRINE HYDROCHLORIDE 10 MG/ML
INJECTION INTRAVENOUS
Status: DISCONTINUED | OUTPATIENT
Start: 2024-04-11 | End: 2024-04-11

## 2024-04-11 RX ORDER — KETAMINE HCL IN 0.9 % NACL 50 MG/5 ML
SYRINGE (ML) INTRAVENOUS
Status: DISCONTINUED | OUTPATIENT
Start: 2024-04-11 | End: 2024-04-11

## 2024-04-11 RX ORDER — SODIUM CHLORIDE 9 MG/ML
INJECTION, SOLUTION INTRAVENOUS
Status: COMPLETED | OUTPATIENT
Start: 2024-04-11 | End: 2024-04-11

## 2024-04-11 RX ORDER — METHOCARBAMOL 750 MG/1
750 TABLET, FILM COATED ORAL 3 TIMES DAILY
Status: DISCONTINUED | OUTPATIENT
Start: 2024-04-11 | End: 2024-04-11 | Stop reason: HOSPADM

## 2024-04-11 RX ORDER — ROPIVACAINE HYDROCHLORIDE 5 MG/ML
INJECTION, SOLUTION EPIDURAL; INFILTRATION; PERINEURAL
Status: COMPLETED | OUTPATIENT
Start: 2024-04-11 | End: 2024-04-11

## 2024-04-11 RX ORDER — SODIUM CHLORIDE 9 MG/ML
INJECTION, SOLUTION INTRAVENOUS CONTINUOUS PRN
Status: DISCONTINUED | OUTPATIENT
Start: 2024-04-11 | End: 2024-04-11

## 2024-04-11 RX ORDER — POLYETHYLENE GLYCOL 3350 17 G/17G
17 POWDER, FOR SOLUTION ORAL DAILY
Status: DISCONTINUED | OUTPATIENT
Start: 2024-04-11 | End: 2024-04-11 | Stop reason: HOSPADM

## 2024-04-11 RX ORDER — FAMOTIDINE 10 MG/ML
INJECTION INTRAVENOUS
Status: DISCONTINUED | OUTPATIENT
Start: 2024-04-11 | End: 2024-04-11

## 2024-04-11 RX ORDER — PROCHLORPERAZINE EDISYLATE 5 MG/ML
5 INJECTION INTRAMUSCULAR; INTRAVENOUS EVERY 6 HOURS PRN
Status: DISCONTINUED | OUTPATIENT
Start: 2024-04-11 | End: 2024-04-11 | Stop reason: HOSPADM

## 2024-04-11 RX ADMIN — ROPIVACAINE HYDROCHLORIDE 7.5 ML: 5 INJECTION EPIDURAL; INFILTRATION; PERINEURAL at 06:04

## 2024-04-11 RX ADMIN — TRANEXAMIC ACID 1000 MG: 100 INJECTION INTRAVENOUS at 08:04

## 2024-04-11 RX ADMIN — OXYCODONE 5 MG: 5 TABLET ORAL at 10:04

## 2024-04-11 RX ADMIN — HYDROMORPHONE HYDROCHLORIDE 0.2 MG: 1 INJECTION, SOLUTION INTRAMUSCULAR; INTRAVENOUS; SUBCUTANEOUS at 10:04

## 2024-04-11 RX ADMIN — LIDOCAINE HYDROCHLORIDE 100 MG: 20 INJECTION INTRAVENOUS at 07:04

## 2024-04-11 RX ADMIN — OXYCODONE 5 MG: 5 TABLET ORAL at 09:04

## 2024-04-11 RX ADMIN — ONDANSETRON 4 MG: 2 INJECTION INTRAMUSCULAR; INTRAVENOUS at 07:04

## 2024-04-11 RX ADMIN — VANCOMYCIN HYDROCHLORIDE 1500 MG: 1.5 INJECTION, POWDER, LYOPHILIZED, FOR SOLUTION INTRAVENOUS at 06:04

## 2024-04-11 RX ADMIN — Medication: at 09:04

## 2024-04-11 RX ADMIN — ONDANSETRON 4 MG: 2 INJECTION INTRAMUSCULAR; INTRAVENOUS at 01:04

## 2024-04-11 RX ADMIN — MIDAZOLAM 2 MG: 1 INJECTION INTRAMUSCULAR; INTRAVENOUS at 06:04

## 2024-04-11 RX ADMIN — PREGABALIN 75 MG: 75 CAPSULE ORAL at 06:04

## 2024-04-11 RX ADMIN — CEFAZOLIN 2 G: 2 INJECTION, POWDER, FOR SOLUTION INTRAMUSCULAR; INTRAVENOUS at 07:04

## 2024-04-11 RX ADMIN — FAMOTIDINE 20 MG: 10 INJECTION, SOLUTION INTRAVENOUS at 07:04

## 2024-04-11 RX ADMIN — SODIUM CHLORIDE: 0.9 INJECTION, SOLUTION INTRAVENOUS at 06:04

## 2024-04-11 RX ADMIN — FENTANYL CITRATE 100 MCG: 50 INJECTION, SOLUTION INTRAMUSCULAR; INTRAVENOUS at 06:04

## 2024-04-11 RX ADMIN — HYDROMORPHONE HYDROCHLORIDE 0.2 MG: 1 INJECTION, SOLUTION INTRAMUSCULAR; INTRAVENOUS; SUBCUTANEOUS at 11:04

## 2024-04-11 RX ADMIN — SODIUM CHLORIDE, SODIUM GLUCONATE, SODIUM ACETATE, POTASSIUM CHLORIDE, MAGNESIUM CHLORIDE, SODIUM PHOSPHATE, DIBASIC, AND POTASSIUM PHOSPHATE: .53; .5; .37; .037; .03; .012; .00082 INJECTION, SOLUTION INTRAVENOUS at 08:04

## 2024-04-11 RX ADMIN — PHENYLEPHRINE HYDROCHLORIDE 100 MCG: 10 INJECTION INTRAVENOUS at 07:04

## 2024-04-11 RX ADMIN — MUPIROCIN 1 G: 20 OINTMENT TOPICAL at 06:04

## 2024-04-11 RX ADMIN — PROPOFOL 50 MG: 10 INJECTION, EMULSION INTRAVENOUS at 07:04

## 2024-04-11 RX ADMIN — PROPOFOL 100 MCG/KG/MIN: 10 INJECTION, EMULSION INTRAVENOUS at 07:04

## 2024-04-11 RX ADMIN — PHENYLEPHRINE HYDROCHLORIDE 200 MCG: 10 INJECTION INTRAVENOUS at 08:04

## 2024-04-11 RX ADMIN — CARBOXYMETHYLCELLULOSE SODIUM 2 DROP: 10 GEL OPHTHALMIC at 09:04

## 2024-04-11 RX ADMIN — ACETAMINOPHEN 1000 MG: 500 TABLET ORAL at 06:04

## 2024-04-11 RX ADMIN — METHOCARBAMOL 750 MG: 750 TABLET ORAL at 09:04

## 2024-04-11 RX ADMIN — SODIUM CHLORIDE: 9 INJECTION, SOLUTION INTRAVENOUS at 10:04

## 2024-04-11 RX ADMIN — DEXAMETHASONE SODIUM PHOSPHATE 8 MG: 4 INJECTION, SOLUTION INTRAMUSCULAR; INTRAVENOUS at 07:04

## 2024-04-11 RX ADMIN — Medication 30 MG: at 07:04

## 2024-04-11 RX ADMIN — TRANEXAMIC ACID 1000 MG: 100 INJECTION INTRAVENOUS at 07:04

## 2024-04-11 RX ADMIN — ACETAMINOPHEN 1000 MG: 500 TABLET ORAL at 12:04

## 2024-04-11 RX ADMIN — MEPIVACAINE HYDROCHLORIDE 3 ML: 20 INJECTION, SOLUTION EPIDURAL; INFILTRATION at 07:04

## 2024-04-11 RX ADMIN — OXYCODONE 5 MG: 5 TABLET ORAL at 01:04

## 2024-04-11 RX ADMIN — SODIUM CHLORIDE: 9 INJECTION, SOLUTION INTRAVENOUS at 06:04

## 2024-04-11 NOTE — ANESTHESIA PROCEDURE NOTES
Adductor canal catheter    Patient location during procedure: pre-op   Block not for primary anesthetic.  Reason for block: at surgeon's request and post-op pain management   Post-op Pain Location: left knee   Start time: 4/11/2024 6:36 AM  Timeout: 4/11/2024 6:35 AM   End time: 4/11/2024 6:42 AM    Staffing  Authorizing Provider: Lorena Morrow MD  Performing Provider: Lorena Morrow MD    Staffing  Performed by: Lorena Morrow MD  Authorized by: Lorena Morrow MD    Preanesthetic Checklist  Completed: patient identified, IV checked, site marked, risks and benefits discussed, surgical consent, monitors and equipment checked, pre-op evaluation and timeout performed  Peripheral Block  Patient position: supine  Prep: ChloraPrep and site prepped and draped  Patient monitoring: heart rate, cardiac monitor, continuous pulse ox, continuous capnometry and frequent blood pressure checks  Block type: adductor canal  Laterality: left  Injection technique: continuous  Needle  Needle type: Tuohy   Needle gauge: 17 G  Needle length: 3.5 in  Needle localization: anatomical landmarks and ultrasound guidance  Catheter type: spring wound  Catheter size: 19 G  Test dose: lidocaine 1.5% with Epi 1-to-200,000 and negative   -ultrasound image captured on disc.  Assessment  Injection assessment: negative aspiration, negative parasthesia and local visualized surrounding nerve  Paresthesia pain: none  Heart rate change: no  Slow fractionated injection: yes  Pain Tolerance: comfortable throughout block and no complaints  Medications:    Medications: ropivacaine (NAROPIN) injection 0.5% - Perineural   7.5 mL - 4/11/2024 6:38:00 AM    Additional Notes  VSS.  [facility] RN monitoring vitals throughout procedure.  Patient tolerated procedure well.     15cc 0.25% ropi given

## 2024-04-11 NOTE — TRANSFER OF CARE
Anesthesia Transfer of Care Note    Patient: Esa Yang    Procedure(s) Performed: Procedure(s) (LRB):  ARTHROPLASTY, KNEE, TOTAL, MADY COMPUTER-ASSISTED NAVIGATION: LEFT: SAME DAY (Left)    Patient location: PACU    Anesthesia Type: general    Transport from OR: Transported from OR on 6-10 L/min O2 by face mask with adequate spontaneous ventilation    Post pain: adequate analgesia    Post assessment: no apparent anesthetic complications    Post vital signs: stable    Level of consciousness: awake and sedated    Nausea/Vomiting: no nausea/vomiting    Complications: none    Transfer of care protocol was followed    Last vitals: Visit Vitals  BP (!) 156/91 (BP Location: Right arm, Patient Position: Lying)   Pulse 105   Temp 36.6 °C (97.9 °F) (Temporal)   Resp 20   Ht 6' (1.829 m)   Wt 119.3 kg (263 lb)   SpO2 99%   BMI 35.67 kg/m²

## 2024-04-11 NOTE — INTERVAL H&P NOTE
Esario Yang was interviewed, examined and the H and P reviewed.  There has been no interval change in his History and Physical.

## 2024-04-11 NOTE — PLAN OF CARE
Problem: Physical Therapy  Goal: Physical Therapy Goal  Description: Pt met goals at [facility] to demonstrate safe level of mobility for d/c home with his wife to assist PRN.     Outcome: Adequate for Care Transition

## 2024-04-11 NOTE — PT/OT/SLP EVAL
"Physical Therapy Evaluation  and Treatment and Discharge Summary    Patient Name: Esa Yang   MRN: [id number]  Recent Surgery: Procedure(s) (LRB):  ARTHROPLASTY, KNEE, TOTAL, MADY COMPUTER-ASSISTED NAVIGATION: LEFT: SAME DAY (Left) Day of Surgery    Recommendations:     Discharge Recommendations: Low Intensity Therapy   Discharge Equipment Recommendations: walker, rolling, shower chair   Barriers to discharge: None    Patient demonstrates a mobility limitation that significantly impairs their ability to participate in one or more mobility related activities of daily living. Patient's mobility limitation cannot be sufficiently resolved with the use of a cane, but can be sufficiently resolved with the use of a rolling walker.The use of a rolling walker will considerably improve their ability to participate in MRADLs. Patient will use the walker on a regular basis at home.     Assessment:     Esa Yang is a 54 y.o. male admitted with a medical diagnosis of Post-traumatic osteoarthritis of left knee. He presents with the following impairments/functional limitations: weakness, impaired endurance, impaired self care skills, impaired functional mobility, gait instability, decreased lower extremity function, pain, decreased ROM, edema. Pt presents s/p L TKA with expected post-op deficits.  Pt did really well with PT today and was able to amb 60 feet  with RW and CGA .    He ascended/descended 6" curb step with RW and CGA.  Pt did well and had not LOB and no dizziness.  Pt is ready for d/c home today from PT standpoint with his wife  to assist PRN and  will benefit from OPPT for cont PT to maximize  functional recovery, strength and ROM.        Rehab Prognosis: Good; patient would benefit from cont PT services after d/c to address these deficits and reach maximum level of function.    Plan:     During this hospitalization, patient to be d/c home today with his wife to assist PRN and OPPT to address the above " "listed problems via gait training, therapeutic activities, therapeutic exercises    Plan of Care Expires: 05/11/24    Subjective     Chief Complaint: " I am so sleepy"  Patient Comments/Goals: to go home and nap  Pain/Comfort:  Pain Rating 1: 6/10  Location - Side 1: Left  Location 1: knee  Pain Addressed 1: Pre-medicate for activity, Reposition, Distraction  Pain Rating Post-Intervention 1: 6/10    Social History:  Living Environment: Patient lives with their spouse in a single story home with number of outside stair(s): entry step only  Prior Level of Function: Prior to admission, patient was independent  Equipment Used at Home: none  DME owned (not currently used): rolling walker and shower chair  Assistance Upon Discharge:  his wife    Objective:     Communicated with RN and OT prior to session. Patient found up in chair with cryotherapy, peripheral IV upon PT entry to room.  Pt's wife present in room and for therapy in the room    General Precautions: Standard, fall   Orthopedic Precautions: LLE weight bearing as tolerated   Braces: N/A    Respiratory Status: Room air    Exams:  RLE ROM: WFL  RLE Strength: WFL  LLE ROM: WFL except decreased knee flex/ext due to pain, post op status and post op bandages  LLE Strength:  at least 3-/5  Cognitive: Patient is oriented to Person, Place, Time, Situation  Gross Motor Coordination: WFL  Postural Exam: Patient presented with the following abnormalities:    -       Rounded shoulders  -       Forward head  Sensation:    -       Intact    Functional Mobility:  Gait belt applied - Yes  Bed Mobility  Seated in chair at start of session and returned to chair  Transfers  Sit to Stand: contact guard assistance with rolling walker and with cues for hand placement and foot placement  Gait  Patient ambulated 60 feet with rolling walker and contact guard assistance. Patient required cues for heel strike, position in walker, and upright posture to increase independence and safety. " "Patient required cues ~ 25% of the time.  Balance  Sitting: GOOD: Maintains balance through MODERATE excursions of active trunk movement  Standing: FAIR: Needs CONTACT GUARD during gait  Stairs: Pt ascended/descended 4" curb step with Rolling Walker with no handrails with Contact Guard Assistance.     Therapeutic Activities and Exercises:  Patient educated on role of acute care PT and PT POC, safety while in hospital including calling nurse for mobility, and call light usage.  Educated about weightbearing as blanka L LE and provided cuing for adherence as appropriate during session.  Educated about importance of OOB mobility and remaining up in chair most of the day.  Pt ed on use of cryotherapy for edema and pain control, and on safety awareness with use of RW in the home and pt verbalized good understanding back to PT.   PT reviewed and demonstrated car transfers with pt and caregiver and answered all questions.  Pt and caregiver verbalized good understanding back to PT.   Pt instructed on therapeutic ex's for TKA HEP (QS, AP, GS, HS, Hip abd, SLR, SAQ, LAQ) x 10 reps x 3 sets each for muscle strengthening, endurance and increase knee ROM. Pt verbalized good understanding back to PT. Patient required skilled PT for instruction of exercises and appropriate cues to perform exercises safely and appropriately.  Issued written handout of TKA HEP and reviewed with pt.  Pt ed on importance of elevating  LE above level of his heart 3-4 times a day and proper placement of pillows to keep knee extended and not flexed.  Pt and his caregiver verbalized good understanding back to PT.    Pt ed on mobility expectations at home and he verbalized good understanding.    AM-PAC 6 CLICK MOBILITY  Total Score:18    Patient left up in chair with all lines intact, call button in reach, RN notified, and spouse present.    GOALS:   Multidisciplinary Problems       Physical Therapy Goals          Problem: Physical Therapy    Goal Priority " Disciplines Outcome Goal Variances Interventions   Physical Therapy Goal     PT, PT/OT Adequate for Care Transition     Description: Pt met goals at eval to demonstrate safe level of mobility for d/c home with his wife to assist PRN.                          History:     Past Medical History:   Diagnosis Date    Allergy     Appendicitis 02/13/2023    Arthritis     Back pain     Chest pain 11/14/2014    Fatigue 09/19/2018    GERD (gastroesophageal reflux disease)     Hesitancy 05/22/2018    Hyperlipidemia     Low testosterone in male 05/22/2018    Nausea and vomiting 02/13/2023    Palpitations 11/14/2014    Post-void dribbling 05/22/2018    S/P ACL reconstruction 08/24/2023    S/P appendectomy 02/28/2023    S/P hernia repair 02/28/2023    S/P L knee surgery, arthroscopic medial/lateral menisectomy, loose body removal (12/31/14) 01/14/2015    Slow urinary stream 05/22/2018    Umbilical hernia without obstruction and without gangrene 02/13/2023       Past Surgical History:   Procedure Laterality Date    ANKLE SURGERY Left     ARTHROSCOPIC REPAIR OF ROTATOR CUFF OF SHOULDER Left 12/16/2022    Procedure: REPAIR, ROTATOR CUFF, ARTHROSCOPIC;  Surgeon: Montana Russ Jr., MD;  Location: [facility] OR;  Service: Orthopedics;  Laterality: Left;  need opus system [employer] notified cc    ARTHROSCOPY OF SHOULDER WITH DECOMPRESSION OF SUBACROMIAL SPACE Left 12/16/2022    Procedure: ARTHROSCOPY, SHOULDER, WITH SUBACROMIAL SPACE DECOMPRESSION;  Surgeon: Montana Russ Jr., MD;  Location: [facility] OR;  Service: Orthopedics;  Laterality: Left;    BACK SURGERY  01/30/2017    INJECTION, TENDON SHEATH OR LIGAMENT, 1 TENDON SHEATH OR LIGAMENT Right 12/16/2022    Procedure: INJECTION,TENDON SHEATH OR LIGAMENT,1 TENDON SHEATH OR LIGAMENT;  Surgeon: Montana Russ Jr., MD;  Location: [facility] OR;  Service: Orthopedics;  Laterality: Right;    KNEE SURGERY      x 5    LAPAROSCOPIC APPENDECTOMY N/A 2/13/2023    Procedure: APPENDECTOMY, LAPAROSCOPIC;   Surgeon: Babs Cano MD;  Location: [facility] OR;  Service: General;  Laterality: N/A;    REPAIR, HERNIA, UMBILICAL  2/13/2023    Procedure: REPAIR, HERNIA, UMBILICAL;  Surgeon: Babs Cano MD;  Location: [facility];  Service: General;;    VASECTOMY         Time Tracking:     PT Received On: 04/11/24  PT Start Time: 1227  PT Stop Time: 1248  PT Total Time (min): 21 min     Billable Minutes: Evaluation 10 and Gait Training 11    4/11/2024

## 2024-04-11 NOTE — PT/OT/SLP EVAL
"Occupational Therapy Evaluation and Treatment    Name: Esa Yang  MRN: [id number]  Admitting Diagnosis: Post-traumatic osteoarthritis of left knee Day of Surgery  Recent Surgery: Procedure(s) (LRB):  ARTHROPLASTY, KNEE, TOTAL, MADY COMPUTER-ASSISTED NAVIGATION: LEFT: SAME DAY (Left) Day of Surgery    Recommendations:     Discharge Recommendations: Low Intensity Therapy  Level of Assistance Recommended: 24 hours supervision  Discharge Equipment Recommendations: walker, rolling, shower chair  Barriers to discharge: None    Assessment:     Esa Yang is a 54 y.o. male with a medical diagnosis of Post-traumatic osteoarthritis of left knee. He presents with performance deficits affecting function including impaired self care skills, impaired functional mobility, orthopedic precautions. Pt was able to perform supine/sit T/F I and Sit <> Stand Transfer with modified independence with rolling walker Bed <> Chair Transfer using Stand Pivot technique with modified independence with rolling walker Toilet Transfer Stand Pivot technique with modified independence with rolling walker and bedside commode.  Able to perform UB/LB dressing c modified independence  Educated pt on bathing, car transfers, and cryotherapy.       Rehab Prognosis: Good; patient would benefit from acute OT services to address these deficits and reach maximum level of function.    Plan:     Patient to be seen daily to address the above listed problems via self-care/home management, therapeutic activities, therapeutic exercises  Plan of Care Expires: 04/11/24  Plan of Care Reviewed with: patient, spouse    Subjective     Chief Complaint: L TKA  Patient Comments/Goals: "I feel sleepy."  Pain/Comfort:  Pain Rating 1: 8/10    Patients cultural, spiritual, Congregational conflicts given the current situation: no    Social History:  Living Environment: Patient lives with their spouse in a single story home with number of outside stair(s): 1 and walk-in " shower  Prior Level of Function: Prior to admission, patient was independent.  Roles and Routines: Patient was driving and working prior to admission.  Equipment Used at Home: none  DME owned (not currently used): none  Assistance Upon Discharge: significant other    Objective:     Communicated with RN prior to session. Patient found supine with cryotherapy upon OT entry to room.    General Precautions: Standard, fall   Orthopedic Precautions: LLE weight bearing as tolerated   Braces: N/A    Respiratory Status: Room air    Occupational Performance    Gait belt applied - Yes    Bed Mobility:   Supine to sit from right side of bed with supervision    Functional Mobility/Transfers:  Sit <> Stand Transfer with contact guard assistance with rolling walker  Bed <> Chair Transfer using Stand Pivot technique with contact guard assistance with rolling walker  Toilet Transfer Stand Pivot technique with contact guard assistance with rolling walker and bedside commode  Functional Mobility: Pt was able to walk to bathroom c CGA and RW.    Activities of Daily Living:  Grooming: contact guard assistance to wash hands while standing at sink c RW.  Upper Body Dressing: modified independence to don shirt.  Lower Body Dressing: modified independence to don underwear, pants, socks, and shoes.  Toileting: modified independence for toilet hygiene.      Physical Exam:  Upper Extremity Range of Motion:     -       Right Upper Extremity: WFL  -       Left Upper Extremity: WFL  Upper Extremity Strength:    -       Right Upper Extremity: WFL  -       Left Upper Extremity: WFL    AMPAC 6 Click ADL:  AMPAC Total Score: 24    Treatment & Education:  Educated on the importance of mobility to maximize recovery  Educated on the importance of OOB mobility within safe range in order to decrease adverse effects of prolonged bedrest  Educated on safety with functional mobility; hand placement to ensure safe transfers to various surfaces in prep for  ADLs  Educated on performing functional mobility and ADLs in adherence to orthopedic precautions  Educated on weight bearing status  Will continue to educate as needed      Patient clear to ambulate in hallways with RN/PCT.    Patient left up in chair with all lines intact, call button in reach, and RN notified.    GOALS:   Multidisciplinary Problems       Occupational Therapy Goals          Problem: Occupational Therapy    Goal Priority Disciplines Outcome Interventions   Occupational Therapy Goal     OT, PT/OT Ongoing, Progressing    Description: Goals to be met by: 4/11/24     Patient will increase functional independence with ADLs by performing:    UE Dressing with [place].  LE Dressing with Modified [place] and Assistive Devices as needed.  Grooming while standing at sink with Modified [place].  Toileting from bedside commode with Modified [place] for hygiene and clothing management.   Bathing from  shower chair/bench with Modified [place].  Toilet transfer to bedside commode with Modified [place].                         History:     Past Medical History:   Diagnosis Date    Allergy     Appendicitis 02/13/2023    Arthritis     Back pain     Chest pain 11/14/2014    Fatigue 09/19/2018    GERD (gastroesophageal reflux disease)     Hesitancy 05/22/2018    Hyperlipidemia     Low testosterone in male 05/22/2018    Nausea and vomiting 02/13/2023    Palpitations 11/14/2014    Post-void dribbling 05/22/2018    S/P ACL reconstruction 08/24/2023    S/P appendectomy 02/28/2023    S/P hernia repair 02/28/2023    S/P L knee surgery, arthroscopic medial/lateral menisectomy, loose body removal (12/31/14) 01/14/2015    Slow urinary stream 05/22/2018    Umbilical hernia without obstruction and without gangrene 02/13/2023         Past Surgical History:   Procedure Laterality Date    ANKLE SURGERY Left     ARTHROSCOPIC REPAIR OF ROTATOR CUFF OF SHOULDER Left 12/16/2022    Procedure: REPAIR, ROTATOR  CUFF, ARTHROSCOPIC;  Surgeon: Montana Russ Jr., MD;  Location: [facility] OR;  Service: Orthopedics;  Laterality: Left;  need opus system wilbur notified cc    ARTHROSCOPY OF SHOULDER WITH DECOMPRESSION OF SUBACROMIAL SPACE Left 12/16/2022    Procedure: ARTHROSCOPY, SHOULDER, WITH SUBACROMIAL SPACE DECOMPRESSION;  Surgeon: Montana Russ Jr., MD;  Location: [facility] OR;  Service: Orthopedics;  Laterality: Left;    BACK SURGERY  01/30/2017    INJECTION, TENDON SHEATH OR LIGAMENT, 1 TENDON SHEATH OR LIGAMENT Right 12/16/2022    Procedure: INJECTION,TENDON SHEATH OR LIGAMENT,1 TENDON SHEATH OR LIGAMENT;  Surgeon: Montana Russ Jr., MD;  Location: [facility] OR;  Service: Orthopedics;  Laterality: Right;    KNEE SURGERY      x 5    LAPAROSCOPIC APPENDECTOMY N/A 2/13/2023    Procedure: APPENDECTOMY, LAPAROSCOPIC;  Surgeon: Babs Cano MD;  Location: [facility] OR;  Service: General;  Laterality: N/A;    REPAIR, HERNIA, UMBILICAL  2/13/2023    Procedure: REPAIR, HERNIA, UMBILICAL;  Surgeon: Babs Cano MD;  Location: [facility];  Service: General;;    VASECTOMY         Time Tracking:     OT Date of Treatment: 04/11/24  OT Start Time: 1134  OT Stop Time: 1217  OT Total Time (min): 43 min    Billable Minutes: Evaluation 13, Self Care/Home Management 15, and Therapeutic Activity 15    4/11/2024

## 2024-04-11 NOTE — DISCHARGE SUMMARY
[place] - Surgery (Hospital)  Discharge Note  Short Stay    Procedure(s) (LRB):  ARTHROPLASTY, KNEE, TOTAL, MADY COMPUTER-ASSISTED NAVIGATION: LEFT: SAME DAY (Left)      OUTCOME: Patient tolerated treatment/procedure well without complication and is now ready for discharge.    Esa Yang underwent L TKA 4/11/24 with Dr. Ortiz at [facility]. Surgery was uncomplicated. Their weight bearing status was WBAT and ROMAT. Post operatively they recovered in PACU without complication. On POD0 they were meeting all measures for discharge including pain well controlled on multimodal regimen, tolerating PO, voiding, mobilizing well with PT/OT. They were discharged home in good condition. Post operative medications were provided including a multimodal pain regimen and eliquis 2.5 bid for DVT ppx x 30d (instead of aspirin, due to aspirin allergy). They will follow up in clinic as scheduled 4/25/24.      DISPOSITION: Home or Self Care    FINAL DIAGNOSIS:  Post-traumatic osteoarthritis of left knee    FOLLOWUP: In clinic 4/25/24    DISCHARGE INSTRUCTIONS:    Discharge Procedure Orders   Activity as tolerated     Sponge bath only until clinic visit     Keep surgical extremity elevated when not ambulating     Lifting restrictions   Order Comments: No strenuous exercise or lifting of > 10 lbs     Weight bearing as tolerated     No driving, operating heavy equipment or signing legal documents while taking pain medication     Leave dressing on - Keep it clean, dry, and intact until clinic visit   Order Comments: Do not remove surgical dressing for 2 weeks post-op. This will be done only by MD at initial post-op visit. If dressing is completely saturated, replace with identical dressing - silver-impregnated hydrocolloid dressing.     Do not get dressings wet. Do not shower.     If dressing continues to be saturated or there are signs of infection, please call Ortho Clinic [phone] for further instructions and to make  appt to be seen.     On POD1 remove ace wrap and cotton padding. Leave Aquacel bandage on until 2  week post op visit in clinic     Call MD for:  temperature >100.4     Call MD for:  persistent nausea and vomiting     Call MD for:  severe uncontrolled pain     Call MD for:  difficulty breathing, headache or visual disturbances     Call MD for:  redness, tenderness, or signs of infection (pain, swelling, redness, odor or green/yellow discharge around incision site)     Call MD for:  hives     Call MD for:  persistent dizziness or light-headedness     Call MD for:  extreme fatigue        TIME SPENT ON DISCHARGE: 45 minutes

## 2024-04-11 NOTE — ANESTHESIA PROCEDURE NOTES
Spinal    Diagnosis: total knee arthroplasty  Patient location during procedure: OR  Start time: 4/11/2024 7:06 AM  Timeout: 4/11/2024 7:05 AM  End time: 4/11/2024 7:09 AM    Staffing  Authorizing Provider: Lorena Morrow MD  Performing Provider: Lorena Morrow MD    Staffing  Performed by: Lorena Morrow MD  Authorized by: Lorena Morrow MD    Preanesthetic Checklist  Completed: patient identified, IV checked, site marked, risks and benefits discussed, surgical consent, monitors and equipment checked, pre-op evaluation and timeout performed  Spinal Block  Patient position: sitting  Prep: ChloraPrep  Patient monitoring: heart rate, continuous pulse ox, continuous capnometry and frequent blood pressure checks  Approach: midline  Location: L3-4  Injection technique: single shot  CSF Fluid: clear free-flowing CSF  Needle  Needle localization: anatomical landmarks  Assessment  Ease of block: easy  Patient's tolerance of the procedure: comfortable throughout block and no complaints  Medications:    Medications: mepivacaine (CARBOCAINE) injection 20 mg/mL (2%) - Intrathecal   3 mL - 4/11/2024 7:08:00 AM

## 2024-04-11 NOTE — OP NOTE
DATE OF PROCEDURE:  4/11/24.     PREOPERATIVE DIAGNOSIS:  Arthritis, left knee.     POSTOPERATIVE DIAGNOSIS:  Arthritis, left knee.     PROCEDURES PERFORMED:  Robotically-assisted left total knee arthroplasty.     SURGEON:  Romeo Ortiz M.D.     ASSISTANT:  PARMJIT Crowley MD     ANESTHESIA:  Regional.     COMPLICATIONS:  None.     COUNTS:  Correct.     DISPOSITION:  Recovery Room, stable.     SPECIMENS:  Bone and cartilage.     FINDINGS:  Arthritis.     FLUIDS:  2000 mL.     ESTIMATED BLOOD LOSS:  <50cc     IMPLANTS:  [place] Triathlon size 7 left  Triathlon cruciate retaining femoral component and a size 7 primary tibial baseplate, 33 mm patella and a size 7, 11 mm   CS tibial insert.     INDICATIONS FOR PROCEDURE:   Esa Yang  is a 54-year-old male who is   having symptoms of right knee pain.  Physical examination and imaging studies   were consistent with arthritis.Treatment options were explained and it was decided   to proceed with robotically-assisted left total knee arthroplasty.  He was   aware of reasonable treatment options as well as risks and benefits.       PROCEDURE IN DETAIL:  After appropriate consent was obtained, the patient   brought in the Operating Room, anesthesia was administered.  He received   antibiotic prophylaxis.  Cast padding and tourniquet was applied to the proximal  left thigh.  left lower extremity was then prepped and draped in usual sterile   fashion.  The leg vences was applied.  Timeout was called.  Limb was elevated   and tourniquet was inflated.  The knee was flexed.  An incision was made from   the tibial tubercle just proximal to the superior pole of the patella.  It was   taken down through the skin and retinacular.  A medial parapatellar arthrotomy   was performed followed by a medial release.  Following this ACL was resected, fat pad   was excised.  The patella was everted.  It was measured and found to be 28 mm,  12 mm of bone removed and the 3 peg holes  were drilled for the 33 mm patella.    Following this, 2 stab incisions were made 4 fingerbreadths below the tibial   tubercle on the medial aspect of the tibial crest.  The 2 guide pins were placed   along with the fixation device through these.  The array was applied and   tightened to the clamp. We checked the security of the array and it was fine. Following this, we placed the femoral pins 1 cm superior and anterior to the MCL insertion.  The check point was placed in between the pins. The guide clamp and array were secured..  Once this was accomplished, the hip center was obtained, the   medial and lateral malleoli were demarcated.  The femoral check point and tibial   check point were placed and the femur and tibia were then registered.    Acceptable registration was obtained.  Osteophytes were removed. We then captured poses in extension,   And approximately 90 degrees of flexion.  Initial alignment was 144 degrees varus and 8  Degrees flexion .  The  initial gaps were   then obtained. The extension gaps were 11 medially and 23 laterally.  The flexion gaps were 10 medially and 17 laterally.  Component   position was adjusted.  We were very satisfied with the component position and   sizing.  We had a size 7 femur and a 7 tibia.  Once this was accomplished, the   robotic arm was brought in and our cuts were made.  The tibia was cut 1st.  We then cut the anterior femur anterior chamfer and posterior femur.  The saw blade was then switched and we made our distal and posterior chamfer cut.  We were very satisfied all the cuts.  Bone fragments were removed.  Lamina  was used to open up posteriorly and we removed any remaining osteophytes and meniscal remnants. The PCL was intact and   robust.  We placed the tibial trial.  We had good coverage with this.  We used   the medial one-third of the tibial tubercle to guide rotation and the trial was pinned in   place.  An 11 mm insert was placed and then the  femoral component was placed.    This was centered on the femur and the knee was brought through a range of   motion.  He was very well balanced in flexion with a 20 mm gap medially and a 21 mm gap  laterally.  In extension, there was an 20 mm gap medially 22 mm gap laterally.    Clinically,there was excellent balance and stability.  Final alignment was 7 degrees flexion and 7 degrees varus. Trial   patellar button was placed.  Patella tracked well.  Therefore, at this point, we   were satisfied with knee range of motion and stability, component position,   sizing and alignment as well as patella tracking.  It should be noted that he  had full extension and he had at least 130 degrees of flexion and there was no   instability at full extension, midflexion, or deep flexion.  Trial components   were removed.  Arrays and femoral and tibial pins were removed. The bone was then prepared for cementing for pulsatile lavage and   drying. Components were then cemented into   place. Tibia followed by femur.  Cement was applied to the tibial keel as   well.  Excess cement was removed.  The tibial insert was firmly seated.  The knee was inspected.  There was no loose body, foreign body, or soft tissue interposition.  It was   then reduced.  Patella button was cemented in place.  Once the cement was dry,   the knee was irrigated with Betadine solution.  Following this, it was irrigated   with pulsatile lavage.  This was periodically done throughout the closure.  The   incision was then closed.  The arthrotomy was closed with #1 Vicryl.  Once the   arthrotomy was closed, the knee was brought through range of motion and was   stable as previously described and the patella tracked well.  The remainder of   the incision was closed with 0 Vicryl, 2-0 Vicryl, Monocryl, and Dermabond.    The stab incisions were closed with Vicryl and Dermabond.  It should be noted   that all check points were removed as well as the femoral and tibial  pins.    Sterile dressing was applied.  He was transferred from the Operating Room table   to a stretcher, brought to Recovery Room in stable condition.  He tolerated the   procedure well and there were no known complications. One gram of IV tranexamic acid was given prior to incision and at closure.

## 2024-04-11 NOTE — PLAN OF CARE
Discharge instructions reviewed and pt  verbalizes understanding. Dressing is clean, dry, and intact. Pain control appropriate. Meds delivered at bedside. Blue armband in place. Walker at bedside for home use. VSS and afebrile. AVS complete.

## 2024-04-11 NOTE — PLAN OF CARE
Problem: Occupational Therapy  Goal: Occupational Therapy Goal  Description: Goals to be met by: 4/11/24     Patient will increase functional independence with ADLs by performing:    UE Dressing with [place].  LE Dressing with Modified [place] and Assistive Devices as needed.  Grooming while standing at sink with Modified [place].  Toileting from bedside commode with Modified [place] for hygiene and clothing management.   Bathing from  shower chair/bench with Modified [place].  Toilet transfer to bedside commode with Modified [place].    Outcome: Ongoing, Progressing

## 2024-04-11 NOTE — PLAN OF CARE
Pre op complete. Questions answered. Resting comfortably. Call light in reach. Personal belongings placed in locker. Pt needs walker.

## 2024-04-12 ENCOUNTER — CLINICAL SUPPORT (OUTPATIENT)
Dept: ORTHOPEDICS | Facility: CLINIC | Age: 54
End: 2024-04-12
Payer: COMMERCIAL

## 2024-04-12 DIAGNOSIS — Z96.652 S/P TOTAL KNEE REPLACEMENT, LEFT: Primary | ICD-10-CM

## 2024-04-12 PROCEDURE — 99499 UNLISTED E&M SERVICE: CPT | Mod: 95,,, | Performed by: ORTHOPAEDIC SURGERY

## 2024-04-12 NOTE — PROGRESS NOTES
I called the patient today regarding his surgery with Dr. Romeo Ortiz. The patient had a left TKA on 4/11/24.     Pain Scale: 8 / 10    Any issues with Fever: No.    Any issues with medications (specifically DVT prophylaxis): No. Eliquis 2.5 mg BID  Celebrex : no,  Okay to take Aleve  Protonix : yes  Resume home meds : Yes    Any issues with bowel movements:  Passing christofer: No.                                                                 Urination: No.                                                                 Constipation: No.    Completing at home exercises: Yes:     Any concerns regarding their dressing/bandage:  No.    Patient confirmed first OP-PT appointment: [facility] on  4/15/24 at pm, faxed external orders to location.     Any other concerns: No.    Blue Bracelet : yes    The patient was informed that if they have any urgent issues with their bandage, medications or any other health concerns regarding their surgery to call the 24/7 Orthopedic Post-op Hot Line at [phone]. The patient was reminded that if they have any chest pain or shortness of breath to call 911 or go to the ER.    The patient verbalized understanding and does not have any other questions

## 2024-04-12 NOTE — ANESTHESIA POST-OP PAIN MANAGEMENT
"Acute Pain Service Progress Note    4/12/2024 1420    Patient contacted regarding [facility] infusion follow up. Reports that pain has been well controlled with the infusion pump. Denies signs of local anesthetic toxicity. PNC dressing remains clean, dry, and intact. All questions answered. Reminded patient that the infusion should be discontinued and PNC removed whenever the "infusion complete" alert is seen on the display screen or by POD 5 (4-16-24) at 12pm, whichever comes first. Encouraged patient to call the [facility] 24/7 support line at [phone] or the [facility] line at [phone] for any [facility] related questions/issues. Verbalizes understanding. Will continue to follow up.            "

## 2024-04-15 NOTE — ANESTHESIA POST-OP PAIN MANAGEMENT
Acute Pain Service Progress Note    4/15/2024 5683    Contacted patient regarding [facility] follow up. Reports pain remains well controlled with the infusion. Denies s/s of local anesthetic toxicity. Dressing remains clean, dry, and intact. Reviewed removal process with patient. Plans to discontinue the infusion by tomorrow at 12pm and remove PNC. All questions answered. Encouraged patient to contact the [facility] 24/7 support line at [phone] or the [facility] line at [phone] should any further assistance be needed. Verbalizes understanding.

## 2024-04-24 ENCOUNTER — HOSPITAL ENCOUNTER (EMERGENCY)
Facility: HOSPITAL | Age: 54
Discharge: HOME | End: 2024-04-24
Attending: STUDENT IN AN ORGANIZED HEALTH CARE EDUCATION/TRAINING PROGRAM
Payer: COMMERCIAL

## 2024-04-24 ENCOUNTER — TELEPHONE (OUTPATIENT)
Dept: ORTHOPEDICS | Facility: CLINIC | Age: 54
End: 2024-04-24
Payer: COMMERCIAL

## 2024-04-24 VITALS
HEART RATE: 88 BPM | BODY MASS INDEX: 35.26 KG/M2 | SYSTOLIC BLOOD PRESSURE: 110 MMHG | RESPIRATION RATE: 18 BRPM | TEMPERATURE: 97 F | WEIGHT: 260 LBS | OXYGEN SATURATION: 99 % | DIASTOLIC BLOOD PRESSURE: 64 MMHG

## 2024-04-24 DIAGNOSIS — T78.40XA ALLERGIC REACTION, INITIAL ENCOUNTER: Primary | ICD-10-CM

## 2024-04-24 DIAGNOSIS — R06.00 DYSPNEA: ICD-10-CM

## 2024-04-24 LAB
ANION GAP SERPL CALC-SCNC: 11 MMOL/L (ref 8–16)
BASOPHILS # BLD AUTO: 0.01 K/UL (ref 0–0.2)
BASOPHILS NFR BLD: 0.1 % (ref 0–1.9)
CALCIUM SERPL-MCNC: 9.5 MG/DL (ref 8.7–10.5)
CHLORIDE SERPL-SCNC: 99 MMOL/L (ref 95–110)
CO2 SERPL-SCNC: 25 MMOL/L (ref 23–29)
CREAT SERPL-MCNC: 1.41 MG/DL (ref 0.5–1.4)
DIFFERENTIAL METHOD BLD: ABNORMAL
EOSINOPHIL # BLD AUTO: 0 K/UL (ref 0–0.5)
EOSINOPHIL NFR BLD: 0.2 % (ref 0–8)
ERYTHROCYTE [DISTWIDTH] IN BLOOD BY AUTOMATED COUNT: 13.3 % (ref 11.5–14.5)
EST. GFR  (NO RACE VARIABLE): 58.9 ML/MIN/1.73 M^2
GLUCOSE SERPL-MCNC: 144 MG/DL (ref 70–110)
HCT VFR BLD AUTO: 39.5 % (ref 40–54)
HGB BLD-MCNC: 12.9 G/DL (ref 14–18)
IMM GRANULOCYTES # BLD AUTO: 0.06 K/UL (ref 0–0.04)
IMM GRANULOCYTES NFR BLD AUTO: 0.4 % (ref 0–0.5)
LYMPHOCYTES # BLD AUTO: 1.8 K/UL (ref 1–4.8)
LYMPHOCYTES NFR BLD: 12.4 % (ref 18–48)
MCH RBC QN AUTO: 25.3 PG (ref 27–31)
MCHC RBC AUTO-ENTMCNC: 32.7 G/DL (ref 32–36)
MCV RBC AUTO: 78 FL (ref 82–98)
MONOCYTES # BLD AUTO: 0.6 K/UL (ref 0.3–1)
MONOCYTES NFR BLD: 3.8 % (ref 4–15)
NEUTROPHILS # BLD AUTO: 12.2 K/UL (ref 1.8–7.7)
NEUTROPHILS NFR BLD: 83.1 % (ref 38–73)
NRBC BLD-RTO: 0 /100 WBC
OHS QRS DURATION: 84 MS
OHS QTC CALCULATION: 400 MS
PLATELET # BLD AUTO: 420 K/UL (ref 150–450)
PMV BLD AUTO: 9.5 FL (ref 9.2–12.9)
POTASSIUM SERPL-SCNC: 4.1 MMOL/L (ref 3.5–5.1)
RBC # BLD AUTO: 5.09 M/UL (ref 4.6–6.2)
SODIUM SERPL-SCNC: 135 MMOL/L (ref 136–145)
UUN UR-MCNC: 17 MG/DL (ref 2–20)
WBC # BLD AUTO: 14.7 K/UL (ref 3.9–12.7)

## 2024-04-24 PROCEDURE — 96374 THER/PROPH/DIAG INJ IV PUSH: CPT | Mod: ER

## 2024-04-24 PROCEDURE — 94761 N-INVAS EAR/PLS OXIMETRY MLT: CPT | Mod: ER

## 2024-04-24 PROCEDURE — 99285 EMERGENCY DEPT VISIT HI MDM: CPT | Mod: 25,ER

## 2024-04-24 PROCEDURE — 96372 THER/PROPH/DIAG INJ SC/IM: CPT | Mod: 59 | Performed by: STUDENT IN AN ORGANIZED HEALTH CARE EDUCATION/TRAINING PROGRAM

## 2024-04-24 PROCEDURE — 96375 TX/PRO/DX INJ NEW DRUG ADDON: CPT | Mod: ER

## 2024-04-24 PROCEDURE — 80048 BASIC METABOLIC PNL TOTAL CA: CPT | Mod: ER | Performed by: STUDENT IN AN ORGANIZED HEALTH CARE EDUCATION/TRAINING PROGRAM

## 2024-04-24 PROCEDURE — 99900035 HC TECH TIME PER 15 MIN (STAT): Mod: ER

## 2024-04-24 PROCEDURE — 96361 HYDRATE IV INFUSION ADD-ON: CPT | Mod: ER

## 2024-04-24 PROCEDURE — 25000003 PHARM REV CODE 250: Mod: ER | Performed by: STUDENT IN AN ORGANIZED HEALTH CARE EDUCATION/TRAINING PROGRAM

## 2024-04-24 PROCEDURE — 85025 COMPLETE CBC W/AUTO DIFF WBC: CPT | Mod: ER | Performed by: STUDENT IN AN ORGANIZED HEALTH CARE EDUCATION/TRAINING PROGRAM

## 2024-04-24 PROCEDURE — 93005 ELECTROCARDIOGRAM TRACING: CPT | Mod: ER

## 2024-04-24 PROCEDURE — 93010 ELECTROCARDIOGRAM REPORT: CPT | Mod: ,,, | Performed by: INTERNAL MEDICINE

## 2024-04-24 PROCEDURE — 63600175 PHARM REV CODE 636 W HCPCS: Mod: ER | Performed by: STUDENT IN AN ORGANIZED HEALTH CARE EDUCATION/TRAINING PROGRAM

## 2024-04-24 RX ORDER — DIPHENHYDRAMINE HYDROCHLORIDE 50 MG/ML
25 INJECTION INTRAMUSCULAR; INTRAVENOUS
Status: COMPLETED | OUTPATIENT
Start: 2024-04-24 | End: 2024-04-24

## 2024-04-24 RX ORDER — EPINEPHRINE 0.3 MG/.3ML
0.3 INJECTION SUBCUTANEOUS
Status: COMPLETED | OUTPATIENT
Start: 2024-04-24 | End: 2024-04-24

## 2024-04-24 RX ORDER — EPINEPHRINE 0.3 MG/.3ML
1 INJECTION SUBCUTANEOUS
Qty: 1 EACH | Refills: 0 | Status: SHIPPED | OUTPATIENT
Start: 2024-04-24 | End: 2025-04-24

## 2024-04-24 RX ORDER — DEXAMETHASONE SODIUM PHOSPHATE 4 MG/ML
8 INJECTION, SOLUTION INTRA-ARTICULAR; INTRALESIONAL; INTRAMUSCULAR; INTRAVENOUS; SOFT TISSUE
Status: COMPLETED | OUTPATIENT
Start: 2024-04-24 | End: 2024-04-24

## 2024-04-24 RX ORDER — CETIRIZINE HYDROCHLORIDE 10 MG/1
10 TABLET ORAL DAILY
Qty: 30 TABLET | Refills: 0 | Status: SHIPPED | OUTPATIENT
Start: 2024-04-24 | End: 2025-04-24

## 2024-04-24 RX ORDER — FAMOTIDINE 10 MG/ML
20 INJECTION INTRAVENOUS
Status: COMPLETED | OUTPATIENT
Start: 2024-04-24 | End: 2024-04-24

## 2024-04-24 RX ADMIN — SODIUM CHLORIDE 1000 ML: 9 INJECTION, SOLUTION INTRAVENOUS at 09:04

## 2024-04-24 RX ADMIN — EPINEPHRINE 0.3 MG: 0.3 INJECTION INTRAMUSCULAR; SUBCUTANEOUS at 09:04

## 2024-04-24 RX ADMIN — FAMOTIDINE 20 MG: 10 INJECTION, SOLUTION INTRAVENOUS at 09:04

## 2024-04-24 RX ADMIN — DEXAMETHASONE SODIUM PHOSPHATE 8 MG: 4 INJECTION, SOLUTION INTRA-ARTICULAR; INTRALESIONAL; INTRAMUSCULAR; INTRAVENOUS; SOFT TISSUE at 09:04

## 2024-04-24 RX ADMIN — DIPHENHYDRAMINE HYDROCHLORIDE 25 MG: 50 INJECTION INTRAMUSCULAR; INTRAVENOUS at 09:04

## 2024-04-24 NOTE — ED PROVIDER NOTES
NAME:  Esa Yang  CSN:     [id number]  MRN:    [id number]  ADMIT DATE: 4/24/2024        eMERGENCY dEPARTMENT eNCOUnter    CHIEF COMPLAINT    Chief Complaint   Patient presents with    Allergic Reaction     Patient reports sob, hives and swelling to bilateral hands since yesterday.        HPI    Esa Yang is a 54 y.o. male with a past medical history of  has a past medical history of Allergy, Appendicitis (02/13/2023), Arthritis, Back pain, Chest pain (11/14/2014), Fatigue (09/19/2018), GERD (gastroesophageal reflux disease), Hesitancy (05/22/2018), Hyperlipidemia, Low testosterone in male (05/22/2018), Nausea and vomiting (02/13/2023), Palpitations (11/14/2014), Post-void dribbling (05/22/2018), S/P ACL reconstruction (08/24/2023), S/P appendectomy (02/28/2023), S/P hernia repair (02/28/2023), S/P L knee surgery, arthroscopic medial/lateral menisectomy, loose body removal (12/31/14) (01/14/2015), Slow urinary stream (05/22/2018), and Umbilical hernia without obstruction and without gangrene (02/13/2023).     he presents to the ED due to worsening pruritic rash over the last 2 days with some dyspnea and throat tightness feeling today.  Has had some swelling over the hands as well.  Did take Benadryl earlier but felt that this only made it worse.  Does note a long history of atypia, states as a kid he could not go outside or go into the grass without developing welts.  States if he even touches shellfish he has a severe reaction.  Wife notes many years ago he essentially had [place] syndrome after taking a sulfa medication.  He did have a knee replacement on April 11th and has overall been doing well.  He was given 1 dose of unknown antibiotic at that time.  Since then he has been taking oxycodone as needed for pain with last dose yesterday.  He was also on Xarelto for blood clot prophylaxis and states this is the only medication he is currently taking.  Denies any other new exposures.  He has had a  bandage over the left knee since the procedure in his wondering if this could be contributory as well.      HPI       PAST MEDICAL HISTORY  Past Medical History:   Diagnosis Date    Allergy     Appendicitis 02/13/2023    Arthritis     Back pain     Chest pain 11/14/2014    Fatigue 09/19/2018    GERD (gastroesophageal reflux disease)     Hesitancy 05/22/2018    Hyperlipidemia     Low testosterone in male 05/22/2018    Nausea and vomiting 02/13/2023    Palpitations 11/14/2014    Post-void dribbling 05/22/2018    S/P ACL reconstruction 08/24/2023    S/P appendectomy 02/28/2023    S/P hernia repair 02/28/2023    S/P L knee surgery, arthroscopic medial/lateral menisectomy, loose body removal (12/31/14) 01/14/2015    Slow urinary stream 05/22/2018    Umbilical hernia without obstruction and without gangrene 02/13/2023       SURGICAL HISTORY    Past Surgical History:   Procedure Laterality Date    ANKLE SURGERY Left     ARTHROSCOPIC REPAIR OF ROTATOR CUFF OF SHOULDER Left 12/16/2022    Procedure: REPAIR, ROTATOR CUFF, ARTHROSCOPIC;  Surgeon: Montana Russ Jr., MD;  Location: [facility] OR;  Service: Orthopedics;  Laterality: Left;  need opus system Cheondoism notified cc    ARTHROSCOPY OF SHOULDER WITH DECOMPRESSION OF SUBACROMIAL SPACE Left 12/16/2022    Procedure: ARTHROSCOPY, SHOULDER, WITH SUBACROMIAL SPACE DECOMPRESSION;  Surgeon: Montana Russ Jr., MD;  Location: [facility] OR;  Service: Orthopedics;  Laterality: Left;    BACK SURGERY  01/30/2017    INJECTION, TENDON SHEATH OR LIGAMENT, 1 TENDON SHEATH OR LIGAMENT Right 12/16/2022    Procedure: INJECTION,TENDON SHEATH OR LIGAMENT,1 TENDON SHEATH OR LIGAMENT;  Surgeon: Montana Russ Jr., MD;  Location: [facility] OR;  Service: Orthopedics;  Laterality: Right;    KNEE SURGERY      x 5    LAPAROSCOPIC APPENDECTOMY N/A 2/13/2023    Procedure: APPENDECTOMY, LAPAROSCOPIC;  Surgeon: Babs Cano MD;  Location: [facility] OR;  Service: General;  Laterality: N/A;    REPAIR, HERNIA,  UMBILICAL  2/13/2023    Procedure: REPAIR, HERNIA, UMBILICAL;  Surgeon: Babs Cano MD;  Location: [facility] OR;  Service: General;;    TOTAL KNEE REPLACEMENT USING COMPUTER NAVIGATION Left 4/11/2024    Procedure: ARTHROPLASTY, KNEE, TOTAL, MADY COMPUTER-ASSISTED NAVIGATION: LEFT: SAME DAY;  Surgeon: Romeo Ortiz MD;  Location: [facility] OR;  Service: Orthopedics;  Laterality: Left;    VASECTOMY         FAMILY HISTORY    Family History   Problem Relation Name Age of Onset    Hypertension Father      Prostate cancer Father      Kidney cancer Neg Hx         SOCIAL HISTORY    Social History     Socioeconomic History    Marital status:    Occupational History    Occupation:      Employer: [employer]   Tobacco Use    Smoking status: Some Days     Types: Cigars    Smokeless tobacco: Never    Tobacco comments:     Cigar every once a while   Substance and Sexual Activity    Alcohol use: Yes     Alcohol/week: 2.0 standard drinks of alcohol     Types: 2 Cans of beer per week     Comment: 2/wk    Drug use: No    Sexual activity: Yes     Social Determinants of Health     Financial Resource Strain: Low Risk  (2/5/2024)    Overall Financial Resource Strain (CARDIA)     Difficulty of Paying Living Expenses: Not very hard   Food Insecurity: No Food Insecurity (2/5/2024)    Hunger Vital Sign     Worried About Running Out of Food in the Last Year: Never true     Ran Out of Food in the Last Year: Never true   Transportation Needs: No Transportation Needs (2/5/2024)    PRAPARE - Transportation     Lack of Transportation (Medical): No     Lack of Transportation (Non-Medical): No   Physical Activity: Unknown (2/5/2024)    Exercise Vital Sign     Days of Exercise per Week: 1 day     Minutes of Exercise per Session: Patient declined   Stress: No Stress Concern Present (2/5/2024)    [place] [employer] - Occupational Stress Questionnaire     Feeling of Stress : Only a little   Social  Connections: Unknown (2/5/2024)    Social Connection and Isolation Panel [NHANES]     Frequency of Communication with Friends and Family: More than three times a week     Frequency of Social Gatherings with Friends and Family: Once a week     Active Member of Clubs or Organizations: Yes     Attends Club or Organization Meetings: More than 4 times per year     Marital Status:    Housing Stability: Low Risk  (2/5/2024)    Housing Stability Vital Sign     Unable to Pay for Housing in the Last Year: No     Number of Places Lived in the Last Year: 1     Unstable Housing in the Last Year: No       MEDICATIONS  Current Outpatient Medications   Medication Instructions    acetaminophen (TYLENOL) 650 mg, Oral, Every 8 hours    cetirizine (ZYRTEC) 10 mg, Oral, Daily    ELIQUIS 2.5 mg, Oral, 2 times daily    EPINEPHrine (EPIPEN) 0.3 mg, Intramuscular, As needed (PRN)    gabapentin (NEURONTIN) 100 mg, Oral, 3 times daily    methocarbamoL (ROBAXIN) 750 mg, Oral, 4 times daily PRN    oxyCODONE (ROXICODONE) 5 MG immediate release tablet Take 1-2 tablets every 4-6 hours as needed for pain    pantoprazole (PROTONIX) 40 mg, Oral, Daily    pep injection Inject .25 ml as directed <BR><BR>For compounding pharmacy use: <BR>Add PAPAVERINE 30 mcg<BR>Add PHENTOLAMINE 10 mg<BR>Add ALPROSTADIL 100 mcg    senna-docusate 8.6-50 mg (SENNA WITH DOCUSATE SODIUM) 8.6-50 mg per tablet 1 tablet, Oral, Daily    tamsulosin (FLOMAX) 0.4 mg, Oral, Daily       ALLERGIES    Review of patient's allergies indicates:   Allergen Reactions    Bactrim [sulfamethoxazole-trimethoprim] Blisters     SJS, Swelling    Sulfa (sulfonamide antibiotics) Swelling    Neosporin (neomycin-polymyx) Rash     Topical irritation     Aspirin Swelling    Latex, natural rubber Hives and Itching    Shellfish containing products Swelling         REVIEW OF SYSTEMS   Review of Systems       PHYSICAL EXAM    Reviewed Triage Note    VITAL SIGNS:   ED Triage Vitals [04/24/24 0909]    Enc Vitals Group      BP 98/66      Pulse (!) 114      Resp 20      Temp 97.4 °F (36.3 °C)      Temp Source Oral      SpO2 97 %      Weight 260 lb      Height       Head Circumference       Peak Flow       Pain Score       Pain Loc       Pain Education       Exclude from Growth Chart        Patient Vitals for the past 24 hrs:   BP Temp Temp src Pulse Resp SpO2 Weight   04/24/24 1150 -- -- -- 92 16 98 % --   04/24/24 0938 -- -- -- 92 16 100 % --   04/24/24 0909 98/66 97.4 °F (36.3 °C) Oral (!) 114 20 97 % 117.9 kg (260 lb)       Physical Exam    Nursing note and vitals reviewed.  Constitutional: He appears well-developed and well-nourished.   HENT:   Head: Normocephalic and atraumatic.   Eyes: EOM are normal. Pupils are equal, round, and reactive to light.   Neck: Neck supple.   Normal range of motion.  Cardiovascular:  Normal rate, regular rhythm and normal heart sounds.           No murmur heard.  Pulmonary/Chest: Breath sounds normal. No respiratory distress. He has no wheezes. He has no rales.   Abdominal: Abdomen is soft. There is no abdominal tenderness.   Musculoskeletal:      Cervical back: Normal range of motion and neck supple.      Comments: Good range of motion of the left knee with surgical bandage still in place over the incision site.  No overlying warmth.     Neurological: He is alert and oriented to person, place, and time.   Skin: Skin is warm and dry.   Macular papular rash primarily to the right lower leg with some scattered urticaria to the posterior upper trunk and hands.  There is diffuse mild edema of the digits of the fingers bilaterally.  No swelling of the bilateral lower extremities   Psychiatric: His mood appears anxious.          EKG     Interpreted by EM physician if performed:         ECG Results              EKG 12-lead (In process)        Collection Time Result Time QRS Duration OHS QTC Calculation    04/24/24 09:34:06 04/24/24 10:51:40 84 400                     In process by  Interface, Lab In [facility] (04/24/24 10:51:44)                   Narrative:    Test Reason : R06.00,    Vent. Rate : 092 BPM     Atrial Rate : 092 BPM     P-R Int : 166 ms          QRS Dur : 084 ms      QT Int : 324 ms       P-R-T Axes : 057 031 007 degrees     QTc Int : 400 ms    Normal sinus rhythm  Nonspecific T wave abnormality  Abnormal ECG  When compared with ECG of 09-DEC-2022 09:43,  No significant change was found    Referred By: [email or web address]   SELF           Confirmed By:                                       LABS  Pertinent labs reviewed. (See chart for details)   Labs Reviewed   CBC W/ AUTO DIFFERENTIAL - Abnormal; Notable for the following components:       Result Value    WBC 14.70 (*)     Hemoglobin 12.9 (*)     Hematocrit 39.5 (*)     MCV 78 (*)     MCH 25.3 (*)     Gran # (ANC) 12.2 (*)     Immature Grans (Abs) 0.06 (*)     Gran % 83.1 (*)     Lymph % 12.4 (*)     Mono % 3.8 (*)     All other components within normal limits   BASIC METABOLIC PANEL - Abnormal; Notable for the following components:    Sodium 135 (*)     Glucose 144 (*)     Creatinine 1.41 (*)     eGFR 58.9 (*)     All other components within normal limits         RADIOLOGY          Imaging Results    None           PROCEDURES    Procedures      ED COURSE & MEDICAL DECISION MAKING    Pertinent Labs & Imaging studies reviewed. (See chart for details and specific orders.)          Summary of review of records:   Status post left TKA on April 11th.  No complications from this.  Scheduled for follow-up today.    Medical Decision Making  Amount and/or Complexity of Data Reviewed  Labs: ordered. Decision-making details documented in ED Course.  ECG/medicine tests:  Decision-making details documented in ED Course.    Risk  OTC drugs.  Prescription drug management.      Esa Yang is a 54 y.o. male who presents for 2 days of gradually worsening urticarial rash to the extremities and trunk swelling of the fingers.  Today felt dyspneic and  dizzy and came to the ED for further assessment.  Does note a long history of allergies and atypia.    Differential includes but is not limited to allergic reaction, concern for possible earlier anaphylactic reaction, no evidence of infection            Medications   EPINEPHrine (EPIPEN) 0.3 mg/0.3 mL pen injection 0.3 mg (0.3 mg Intramuscular Given 4/24/24 0944)   diphenhydrAMINE injection 25 mg (25 mg Intravenous Given 4/24/24 0942)   famotidine (PF) injection 20 mg (20 mg Intravenous Given 4/24/24 0942)   dexAMETHasone injection 8 mg (8 mg Intravenous Given 4/24/24 0942)   sodium chloride 0.9% bolus 1,000 mL 1,000 mL (0 mLs Intravenous Stopped 4/24/24 1104)       ED Course as of 04/24/24 1238   Wed Apr 24, 2024   0934 EKG 12-lead  Sinus rhythm At a rate of 92.  No ST elevation or depression.  QTC of 400.  Independently interpreted by me. [HL]   1000 Creatinine(!): 1.41  Baseline of 1.2-1.3. [HL]   1000 Eos %: 0.2 [HL]   1000 WBC(!): 14.70  Leukocytosis with left shift [HL]   1043 On reassessment, patient states he feels significantly improved, rash is resolving.  Vitals are stable.  Not hypoxic on room air.  No wheezing.  Dizziness has resolved. [HL]   1232 On reassessment, urticaria has almost completely resolved.  He has not had any signs of respiratory compromise, nausea, vomiting.  Is requesting to eat.  Stable for discharge home.  Does appear to have outpatient follow up with Orthopedics scheduled for tomorrow.  Prescription of cetirizine and EpiPen provided and reasons to return.  Encouraged him to stay on the blood thinner at this time as I do think that it is unlikely that this is the underlying etiology of his symptoms. [HL]      ED Course User Index  [HL] Scarlet Gaston DO     Allergist referral placed.         FINAL IMPRESSION    Final diagnoses:  [R06.00] Dyspnea  [T78.40XA] Allergic reaction, initial encounter (Primary)       DISPOSITION  Patient discharged in stable condition        ED  Prescriptions       Medication Sig Dispense Start Date End Date Auth. Provider    EPINEPHrine (EPIPEN) 0.3 mg/0.3 mL AtIn Inject 0.3 mLs (0.3 mg total) into the muscle as needed. 1 each 4/24/2024 4/24/2025 Scarlet Gaston DO    cetirizine (ZYRTEC) 10 MG tablet Take 1 tablet (10 mg total) by mouth once daily. 30 tablet 4/24/2024 4/24/2025 Scarlet Gaston DO          Follow-up Information       Follow up With Specialties Details Why Contact Info    Ant Mcdowell MD Family Medicine Schedule an appointment as soon as possible for a visit in 1 week  [address]  [address]  [place]  [place] [address]  [phone]                DISCLAIMER: This note was prepared with [employer] voice recognition transcription software. Garbled syntax, mangled pronouns, and other bizarre constructions may be attributed to that software system.             Scarlet Gaston DO  04/24/24 1243

## 2024-04-24 NOTE — TELEPHONE ENCOUNTER
Pt's wife called stating her  broke out in hives/rash last night and was scheduled to see Sergey today at 1030 am, but he started to have SOB so she brought him to the ER, she adds that when he woke up he was dizzy and felt faint. Informed pt to keep us up to date, but we can see that he is in the ER and we will check in his chart. Notified Dr. Ortiz. Understanding verbalized.

## 2024-04-25 ENCOUNTER — TELEPHONE (OUTPATIENT)
Dept: ORTHOPEDICS | Facility: CLINIC | Age: 54
End: 2024-04-25
Payer: COMMERCIAL

## 2024-04-25 ENCOUNTER — OFFICE VISIT (OUTPATIENT)
Dept: ORTHOPEDICS | Facility: CLINIC | Age: 54
End: 2024-04-25
Payer: COMMERCIAL

## 2024-04-25 VITALS — WEIGHT: 257.94 LBS | BODY MASS INDEX: 34.98 KG/M2

## 2024-04-25 DIAGNOSIS — Z96.652 S/P TOTAL KNEE REPLACEMENT, LEFT: Primary | ICD-10-CM

## 2024-04-25 PROCEDURE — 3044F HG A1C LEVEL LT 7.0%: CPT | Mod: CPTII,S$GLB,,

## 2024-04-25 PROCEDURE — 1160F RVW MEDS BY RX/DR IN RCRD: CPT | Mod: CPTII,S$GLB,,

## 2024-04-25 PROCEDURE — 99999 PR PBB SHADOW E&M-EST. PATIENT-LVL III: CPT | Mod: PBBFAC,,,

## 2024-04-25 PROCEDURE — 99024 POSTOP FOLLOW-UP VISIT: CPT | Mod: S$GLB,,,

## 2024-04-25 PROCEDURE — 1159F MED LIST DOCD IN RCRD: CPT | Mod: CPTII,S$GLB,,

## 2024-04-25 NOTE — TELEPHONE ENCOUNTER
Wife called hotline stating her husbands appt was canceled for today and she would like it rescheduled. Rescheduled appt with Sergey SMYTH for today at 10 am. Understanding verbalized.

## 2024-04-25 NOTE — PROGRESS NOTES
Esa Yang presents for initial post-operative visit following a left total knee arthroplasty performed by Dr. Ortiz on 4/11/2024.  Patient called the total joint line yesterday with complaints of worsening pruritic rash that began 2 days ago with some dyspnea and throat tightness feeling.  He was instructed to go to the ED, in which she received epinephrine, diphenhydramine, famotidine, and dexamethasone, along with discharge medications of cetirizine and an EpiPen.  Today, he endorses relief of the pruritic rash and urticaria.    Exam:   There were no vitals taken for this visit.   Ambulating well with assistive device.  Incision is clean and dry without drainage or erythema.   ROM:  8-105    Initial post-operative radiographs reviewed today revealing a well fixed and aligned prosthesis.    A/P:  2 weeks s/p left total knee replacement    - The patient was advised to keep the incision clean and dry for the next 24 hours after which he may wash the area with antibacterial soap in the shower. Will not submerge until the incision is completely healed.   - Outpatient PT ongoing:  [facility]  - Continue Eliquis for 1 month post op  - Pain medication:  No refill needed  - Reviewed antibiotic prophylaxis   - Patient is scheduled for evaluation by the allergy team on 04/30/2024.  - Follow up in 4 weeks with new x-rays. Pt will call clinic with problems/concerns.

## 2024-04-26 ENCOUNTER — HOSPITAL ENCOUNTER (EMERGENCY)
Facility: HOSPITAL | Age: 54
Discharge: HOME | End: 2024-04-26
Attending: STUDENT IN AN ORGANIZED HEALTH CARE EDUCATION/TRAINING PROGRAM
Payer: COMMERCIAL

## 2024-04-26 ENCOUNTER — TELEPHONE (OUTPATIENT)
Dept: ORTHOPEDICS | Facility: CLINIC | Age: 54
End: 2024-04-26
Payer: COMMERCIAL

## 2024-04-26 VITALS
WEIGHT: 260 LBS | TEMPERATURE: 99 F | HEART RATE: 97 BPM | BODY MASS INDEX: 35.21 KG/M2 | SYSTOLIC BLOOD PRESSURE: 160 MMHG | RESPIRATION RATE: 23 BRPM | DIASTOLIC BLOOD PRESSURE: 91 MMHG | OXYGEN SATURATION: 98 % | HEIGHT: 72 IN

## 2024-04-26 DIAGNOSIS — T78.40XA ALLERGIC REACTION, INITIAL ENCOUNTER: Primary | ICD-10-CM

## 2024-04-26 DIAGNOSIS — L50.9 URTICARIA: ICD-10-CM

## 2024-04-26 PROCEDURE — 25000003 PHARM REV CODE 250: Mod: ER | Performed by: EMERGENCY MEDICINE

## 2024-04-26 PROCEDURE — 96375 TX/PRO/DX INJ NEW DRUG ADDON: CPT | Mod: ER

## 2024-04-26 PROCEDURE — 63600175 PHARM REV CODE 636 W HCPCS: Mod: ER | Performed by: STUDENT IN AN ORGANIZED HEALTH CARE EDUCATION/TRAINING PROGRAM

## 2024-04-26 PROCEDURE — 94760 N-INVAS EAR/PLS OXIMETRY 1: CPT | Mod: ER

## 2024-04-26 PROCEDURE — 99284 EMERGENCY DEPT VISIT MOD MDM: CPT | Mod: 25,ER

## 2024-04-26 PROCEDURE — 96376 TX/PRO/DX INJ SAME DRUG ADON: CPT | Mod: ER

## 2024-04-26 PROCEDURE — 63600175 PHARM REV CODE 636 W HCPCS: Mod: ER | Performed by: EMERGENCY MEDICINE

## 2024-04-26 PROCEDURE — 96374 THER/PROPH/DIAG INJ IV PUSH: CPT | Mod: ER

## 2024-04-26 PROCEDURE — 25000003 PHARM REV CODE 250: Mod: ER | Performed by: STUDENT IN AN ORGANIZED HEALTH CARE EDUCATION/TRAINING PROGRAM

## 2024-04-26 RX ORDER — PREDNISONE 20 MG/1
60 TABLET ORAL DAILY
Qty: 15 TABLET | Refills: 0 | Status: SHIPPED | OUTPATIENT
Start: 2024-04-26 | End: 2024-05-01

## 2024-04-26 RX ORDER — DIPHENHYDRAMINE HYDROCHLORIDE 50 MG/ML
50 INJECTION INTRAMUSCULAR; INTRAVENOUS
Status: COMPLETED | OUTPATIENT
Start: 2024-04-26 | End: 2024-04-26

## 2024-04-26 RX ORDER — CETIRIZINE HYDROCHLORIDE 10 MG/1
10 TABLET ORAL
Status: COMPLETED | OUTPATIENT
Start: 2024-04-26 | End: 2024-04-26

## 2024-04-26 RX ORDER — HYDROXYZINE PAMOATE 50 MG/1
50 CAPSULE ORAL EVERY 4 HOURS PRN
Qty: 30 CAPSULE | Refills: 0 | Status: SHIPPED | OUTPATIENT
Start: 2024-04-26 | End: 2024-05-01

## 2024-04-26 RX ORDER — DIPHENHYDRAMINE HYDROCHLORIDE 50 MG/ML
25 INJECTION INTRAMUSCULAR; INTRAVENOUS
Status: COMPLETED | OUTPATIENT
Start: 2024-04-26 | End: 2024-04-26

## 2024-04-26 RX ORDER — FAMOTIDINE 40 MG/1
40 TABLET, FILM COATED ORAL DAILY
Qty: 5 TABLET | Refills: 0 | Status: SHIPPED | OUTPATIENT
Start: 2024-04-26 | End: 2024-05-01

## 2024-04-26 RX ORDER — FAMOTIDINE 10 MG/ML
40 INJECTION INTRAVENOUS
Status: COMPLETED | OUTPATIENT
Start: 2024-04-26 | End: 2024-04-26

## 2024-04-26 RX ORDER — METHYLPREDNISOLONE SOD SUCC 125 MG
125 VIAL (EA) INJECTION
Status: COMPLETED | OUTPATIENT
Start: 2024-04-26 | End: 2024-04-26

## 2024-04-26 RX ADMIN — DIPHENHYDRAMINE HYDROCHLORIDE 50 MG: 50 INJECTION, SOLUTION INTRAMUSCULAR; INTRAVENOUS at 05:04

## 2024-04-26 RX ADMIN — CETIRIZINE HYDROCHLORIDE 10 MG: 10 TABLET, FILM COATED ORAL at 07:04

## 2024-04-26 RX ADMIN — METHYLPREDNISOLONE SODIUM SUCCINATE 125 MG: 125 INJECTION, POWDER, FOR SOLUTION INTRAMUSCULAR; INTRAVENOUS at 05:04

## 2024-04-26 RX ADMIN — DIPHENHYDRAMINE HYDROCHLORIDE 25 MG: 50 INJECTION, SOLUTION INTRAMUSCULAR; INTRAVENOUS at 07:04

## 2024-04-26 RX ADMIN — FAMOTIDINE 40 MG: 10 INJECTION, SOLUTION INTRAVENOUS at 05:04

## 2024-04-26 NOTE — DISCHARGE INSTRUCTIONS
Please follow up with your allergist as scheduled.    Please speak with your orthopedic surgeon as it seems as though your hives are coming from the Eliquis.    Take your medications as prescribed.    Return to the emergency department if you have lip swelling tongue swelling throat swelling difficulty breathing.  If you experience these things please use your EpiPen immediately and return to the emergency department.

## 2024-04-26 NOTE — PROVIDER PROGRESS NOTES - EMERGENCY DEPT.
Encounter Date: 4/26/2024    ED Physician Progress Notes            **This is an assumption of care note**    Case accepted from Dr. Patterson at shift change, pending response to treatment    For full details please see his note but in short this is a 54-year-old male presents to the emergency department with urticaria after taking Eliquis.    0600 resting comfortably    0730 patient reports that his itching has returned.  Will give additional doses of medication.    0815 patient feeling improved      This is a 54-year-old gentleman who presents emergency department today with urticaria.  The only medication that he was change his read starting Eliquis.  He had a visit for allergic reaction previously where it was suspected that Eliquis was the culprit, he reports that he started taking it again after his orthopedic surgeon recommended that he restart it.  He denies any other changes in medication, food, topical items.  It has been recommended to him that he stopped Eliquis, contact his orthopedic surgeon today to see if they would like to do about the DVT prophylaxis for which they started him on Eliquis for.  Patient was ambulating at this time.  Patient's symptoms are improved here in the emergency department.  I will discharge him home with a short course of steroids, H1 and H2 blockers to further help with his symptoms, he has an appointment with Allergy already.  I discussed with him warning signs for return and the patient understands.  He is appropriate for discharge home.      IMPRESSION:     The primary encounter diagnosis was Allergic reaction, initial encounter. A diagnosis of Urticaria was also pertinent to this visit.     Follow-up Information       [facility].    Why: Keep your appt             Your Orthopedist. Call today.

## 2024-04-26 NOTE — TELEPHONE ENCOUNTER
Wife called stating her  had another allergic reaction and went the ED. ED recommended stopping the Eliquis. Notified Dr. Ortiz. Pt will stop the Eliquis as there is cross reactivity to Xarelto. Pt will will not take anything per Angel. Advised the pt ambulate every hour while awake and do ankle pumps. Understanding verbalized. Wife asked if he can take Fish oil. Message fowarded to Ruel SMYTH to advise will have K[facility] call pt back.

## 2024-04-26 NOTE — ED NOTES
Assumed care of PT that presented to the ED with allergic reaction to eluquis. PT reports having knee replacement surgery 2 weeks ago, started eluquis post op. Upon assessment PT has hives. +itching. Airway patent. Denies SOB. AAOx4.

## 2024-04-26 NOTE — ED PROVIDER NOTES
ED Provider Note - 4/26/2024    History     Chief Complaint   Patient presents with    Allergic Reaction     Pt reports started taking eliquis again as directed, had reaction last visit and was advised to take again. Reports took two doses yesterday and began with hives to body last night. Reports generalized itching. Took dose of beandryl 50 mg at approx midnight         HPI     Esa Yang is a 54 y.o. year old male with past medical and surgical history as seen below, presenting with chief complaint of hives.  Patient is 2 weeks postop left knee arthroplasty.  He was on prophylactic Eliquis for DVT.  He is significant history of allergic reactions to multiple different medications and topical agents.  He was seen in this emergency department 2 days ago with allergic reaction accompanied by shortness of breath received epinephrine, histamine blockers, Decadron with improvement of symptoms.  He followed up 1 day ago with his orthopedic clinic who instructed him to restart the Eliquis.  He had not had any issues since the emergency department visit.  Yesterday evening after taking loading dose of Eliquis he began having generalized hives across all extremities and torso.  This time however he did not have any difficulty breathing.        Past Medical History:   Diagnosis Date    Allergy     Appendicitis 02/13/2023    Arthritis     Back pain     Chest pain 11/14/2014    Fatigue 09/19/2018    GERD (gastroesophageal reflux disease)     Hesitancy 05/22/2018    Hyperlipidemia     Low testosterone in male 05/22/2018    Nausea and vomiting 02/13/2023    Palpitations 11/14/2014    Post-void dribbling 05/22/2018    S/P ACL reconstruction 08/24/2023    S/P appendectomy 02/28/2023    S/P hernia repair 02/28/2023    S/P L knee surgery, arthroscopic medial/lateral menisectomy, loose body removal (12/31/14) 01/14/2015    Slow urinary stream 05/22/2018    Umbilical hernia without obstruction and without gangrene 02/13/2023      Past Surgical History:   Procedure Laterality Date    ANKLE SURGERY Left     ARTHROSCOPIC REPAIR OF ROTATOR CUFF OF SHOULDER Left 12/16/2022    Procedure: REPAIR, ROTATOR CUFF, ARTHROSCOPIC;  Surgeon: Montana Russ Jr., MD;  Location: [facility];  Service: Orthopedics;  Laterality: Left;  need [facility] notified cc    ARTHROSCOPY OF SHOULDER WITH DECOMPRESSION OF SUBACROMIAL SPACE Left 12/16/2022    Procedure: ARTHROSCOPY, SHOULDER, WITH SUBACROMIAL SPACE DECOMPRESSION;  Surgeon: Montana Russ Jr., MD;  Location: [facility];  Service: Orthopedics;  Laterality: Left;    BACK SURGERY  01/30/2017    INJECTION, TENDON SHEATH OR LIGAMENT, 1 TENDON SHEATH OR LIGAMENT Right 12/16/2022    Procedure: INJECTION,TENDON SHEATH OR LIGAMENT,1 TENDON SHEATH OR LIGAMENT;  Surgeon: Montana Russ Jr., MD;  Location: [facility];  Service: Orthopedics;  Laterality: Right;    KNEE SURGERY      x 5    LAPAROSCOPIC APPENDECTOMY N/A 2/13/2023    Procedure: APPENDECTOMY, LAPAROSCOPIC;  Surgeon: Babs Cano MD;  Location: [facility];  Service: General;  Laterality: N/A;    REPAIR, HERNIA, UMBILICAL  2/13/2023    Procedure: REPAIR, HERNIA, UMBILICAL;  Surgeon: Babs Cano MD;  Location: [facility] OR;  Service: General;;    TOTAL KNEE REPLACEMENT USING COMPUTER NAVIGATION Left 4/11/2024    Procedure: ARTHROPLASTY, KNEE, TOTAL, MADY COMPUTER-ASSISTED NAVIGATION: LEFT: SAME DAY;  Surgeon: Romeo Ortiz MD;  Location: [facility];  Service: Orthopedics;  Laterality: Left;    VASECTOMY           Family History   Problem Relation Name Age of Onset    Hypertension Father      Prostate cancer Father      Kidney cancer Neg Hx       Social History     Tobacco Use    Smoking status: Some Days     Types: Cigars    Smokeless tobacco: Never    Tobacco comments:     Cigar every once a while   Substance Use Topics    Alcohol use: Yes     Alcohol/week: 2.0 standard drinks of alcohol     Types: 2 Cans of beer per week     Comment:  2/wk    Drug use: No     Social Determinants of Health with Concerns     Tobacco Use: High Risk (4/25/2024)    Patient History     Smoking Tobacco Use: Some Days     Smokeless Tobacco Use: Never     Passive Exposure: Not on file   Physical Activity: Unknown (2/5/2024)    Exercise Vital Sign     Days of Exercise per Week: 1 day     Minutes of Exercise per Session: Patient declined   Social Connections: Unknown (2/5/2024)    Social Connection and Isolation Panel [NHANES]     Frequency of Communication with Friends and Family: More than three times a week     Frequency of Social Gatherings with Friends and Family: Once a week     Attends Sikh Services: Not on file     Active Member of Clubs or Organizations: Yes     Attends Club or Organization Meetings: More than 4 times per year     Marital Status:    Depression: Not on file      Review of patient's allergies indicates:   Allergen Reactions    Bactrim [sulfamethoxazole-trimethoprim] Blisters     SJS, Swelling    Sulfa (sulfonamide antibiotics) Swelling    Neosporin (neomycin-polymyx) Rash     Topical irritation     Aspirin Swelling    Latex, natural rubber Hives and Itching    Shellfish containing products Swelling       Review of Systems     A full Review of Systems (ROS) was performed and was negative unless otherwise stated in the HPI.      Physical Exam     Vitals:    04/26/24 0602 04/26/24 0711 04/26/24 0743 04/26/24 0833   BP: (!) 157/82 (!) 140/71  (!) 160/91   BP Location:       Patient Position:       Pulse: 103 102 97    Resp: 20 20 (!) 23    Temp:       TempSrc:       SpO2: 99% 97% 98%    Weight:       Height:            Physical Exam    Nursing note and vitals reviewed.  Constitutional: He appears well-developed and well-nourished. No distress.   HENT:   Head: Normocephalic and atraumatic.   Right Ear: External ear normal.   Left Ear: External ear normal.   Nose: Nose normal.   Mouth/Throat: Oropharynx is clear and moist.   Eyes: Conjunctivae  and EOM are normal. Pupils are equal, round, and reactive to light.   Neck: Neck supple.   Normal range of motion.  Cardiovascular:  Normal rate and regular rhythm.           No murmur heard.  Pulmonary/Chest: Breath sounds normal. No stridor. No respiratory distress. He has no wheezes. He has no rhonchi. He has no rales.   Abdominal: Abdomen is soft. Bowel sounds are normal. There is no abdominal tenderness.   Musculoskeletal:         General: No edema. Normal range of motion.      Cervical back: Normal range of motion and neck supple.     Neurological: He is alert and oriented to person, place, and time. He has normal strength. No cranial nerve deficit or sensory deficit.   Skin: Skin is warm and dry. Rash noted. Rash is urticarial (Generalized).   Psychiatric: He has a normal mood and affect. Thought content normal.         Lab Results- Independently reviewed by myself      Labs Reviewed - No data to display        Imaging     Imaging Results    None                    ED Course         Procedures         Orders Placed This Encounter    Insert peripheral IV    methylPREDNISolone sodium succinate injection 125 mg    famotidine (PF) injection 40 mg    diphenhydrAMINE injection 50 mg    cetirizine tablet 10 mg    diphenhydrAMINE injection 25 mg    famotidine (PEPCID) 40 MG tablet    predniSONE (DELTASONE) 20 MG tablet    hydrOXYzine pamoate (VISTARIL) 50 MG Cap                      Medical Decision Making       The patient's list of active medical problems, social history, medications, and allergies as documented per RN staff has been reviewed.           Medical Decision Making  This patient presents with symptoms consistent with acute hypersensitivity reaction, likely acute allergic reaction. Presentation not consistent with acute anaphylaxis (lack of pulmonary, dermatologic, cardiovascular or GI symptoms, lack of hypotension or exposure to known allergen), angioedema, serum sickness (no recent drug exposure, lacks  fevers, arthralgias). No evidence of airway compromise or shock at this time. Patient improved with H1/H2 blockers, steroids. No need for epinephrine.     At time of first reassessment, patient appears to be improving.    Discussion was had with pharmacy regarding options for ongoing dvt prophylaxis. Xarelto appears to have similar fillers to Eliquis, so may also cause allergic reaction for patient. Lovenox injections should contain less filler agents, so may be safer in this regard. Advised patient and wife to f/u with Orthopedic clinic when they open in the morning.    Transition of care to oncoming physician pending resolution of allergic symptoms.    Risk  OTC drugs.  Prescription drug management.                    ED Prescriptions       Medication Sig Dispense Start Date End Date Auth. Provider    famotidine (PEPCID) 40 MG tablet Take 1 tablet (40 mg total) by mouth once daily. for 5 days 5 tablet 4/26/2024 5/1/2024 Collins Sears MD    predniSONE (DELTASONE) 20 MG tablet Take 3 tablets (60 mg total) by mouth once daily. for 5 days 15 tablet 4/26/2024 5/1/2024 Collins Sears MD    hydrOXYzine pamoate (VISTARIL) 50 MG Cap Take 1 capsule (50 mg total) by mouth every 4 (four) hours as needed (itching). 30 capsule 4/26/2024 5/1/2024 Collins Sears MD              Clinical Impression       Follow-up Information       Follow up With Specialties Details Why Contact Info    Allergy Clinic   Keep your appt     Your Orthopedist  Call today              Referrals:  No orders of the defined types were placed in this encounter.      Disposition   ED Disposition Condition    Discharge Stable            Diagnosis    ICD-10-CM ICD-9-CM   1. Allergic reaction, initial encounter  T78.40XA 995.3   2. Urticaria  L50.9 708.9           Adrian Patterson MD        04/27/2024          DISCLAIMER: This note was prepared with [employer] voice recognition transcription software. Garbled syntax, mangled pronouns, and other bizarre  constructions may be attributed to that software system.       Adrian Patterson MD  04/27/24 0443

## 2024-04-30 ENCOUNTER — LAB VISIT (OUTPATIENT)
Dept: LAB | Facility: HOSPITAL | Age: 54
End: 2024-04-30
Payer: COMMERCIAL

## 2024-04-30 ENCOUNTER — OFFICE VISIT (OUTPATIENT)
Dept: ALLERGY | Facility: CLINIC | Age: 54
End: 2024-04-30
Payer: COMMERCIAL

## 2024-04-30 VITALS — HEIGHT: 72 IN | WEIGHT: 259.94 LBS | BODY MASS INDEX: 35.21 KG/M2

## 2024-04-30 DIAGNOSIS — R06.02 SHORTNESS OF BREATH: ICD-10-CM

## 2024-04-30 DIAGNOSIS — L50.8 ACUTE URTICARIA: Primary | ICD-10-CM

## 2024-04-30 DIAGNOSIS — J31.0 CHRONIC RHINITIS: ICD-10-CM

## 2024-04-30 DIAGNOSIS — T78.40XA ALLERGIC REACTION, INITIAL ENCOUNTER: ICD-10-CM

## 2024-04-30 DIAGNOSIS — T78.3XXA ANGIOEDEMA, INITIAL ENCOUNTER: ICD-10-CM

## 2024-04-30 DIAGNOSIS — Z88.9 DRUG ALLERGY: ICD-10-CM

## 2024-04-30 DIAGNOSIS — L50.8 ACUTE URTICARIA: ICD-10-CM

## 2024-04-30 PROCEDURE — 86003 ALLG SPEC IGE CRUDE XTRC EA: CPT | Performed by: ALLERGY & IMMUNOLOGY

## 2024-04-30 PROCEDURE — 3008F BODY MASS INDEX DOCD: CPT | Mod: CPTII,S$GLB,, | Performed by: ALLERGY & IMMUNOLOGY

## 2024-04-30 PROCEDURE — 1160F RVW MEDS BY RX/DR IN RCRD: CPT | Mod: CPTII,S$GLB,, | Performed by: ALLERGY & IMMUNOLOGY

## 2024-04-30 PROCEDURE — 3044F HG A1C LEVEL LT 7.0%: CPT | Mod: CPTII,S$GLB,, | Performed by: ALLERGY & IMMUNOLOGY

## 2024-04-30 PROCEDURE — 99205 OFFICE O/P NEW HI 60 MIN: CPT | Mod: S$GLB,,, | Performed by: ALLERGY & IMMUNOLOGY

## 2024-04-30 PROCEDURE — 1159F MED LIST DOCD IN RCRD: CPT | Mod: CPTII,S$GLB,, | Performed by: ALLERGY & IMMUNOLOGY

## 2024-04-30 PROCEDURE — 99999 PR PBB SHADOW E&M-EST. PATIENT-LVL III: CPT | Mod: PBBFAC,,, | Performed by: ALLERGY & IMMUNOLOGY

## 2024-04-30 PROCEDURE — 86003 ALLG SPEC IGE CRUDE XTRC EA: CPT | Mod: 59 | Performed by: ALLERGY & IMMUNOLOGY

## 2024-05-03 LAB
A ALTERNATA IGE QN: <0.1 KU/L
A FUMIGATUS IGE QN: 0.1 KU/L
ALLERGEN CHAETOMIUM GLOBOSUM IGE: <0.1 KU/L
ALLERGEN LATEX IGE: <0.1 KU/L
ALLERGEN WALNUT TREE IGE: 0.1 KU/L
ALLERGEN WHITE PINE TREE IGE: <0.1 KU/L
ALMOND IGE QN: <0.1 KU/L
BAHIA GRASS IGE QN: 0.1 KU/L
BALD CYPRESS IGE QN: <0.1 KU/L
BERMUDA GRASS IGE QN: 0.14 KU/L
C HERBARUM IGE QN: <0.1 KU/L
C LUNATA IGE QN: <0.1 KU/L
CAT DANDER IGE QN: <0.1 KU/L
CATFISH IGE QN: <0.1 KU/L
CHAETOMIUM GLOB. CLASS: NORMAL
CLAM IGE QN: 0.14 KU/L
CODFISH IGE QN: <0.1 KU/L
COMMON RAGWEED IGE QN: 0.1 KU/L
COTTONWOOD IGE QN: 0.11 KU/L
COW MILK IGE QN: <0.1 KU/L
CRAB IGE QN: 0.11 KU/L
CRAWFISH IGE QN: 0.1 KU/L
D FARINAE IGE QN: <0.1 KU/L
D PTERONYSS IGE QN: <0.1 KU/L
DEPRECATED A ALTERNATA IGE RAST QL: NORMAL
DEPRECATED A FUMIGATUS IGE RAST QL: NORMAL
DEPRECATED ALMOND IGE RAST QL: NORMAL
DEPRECATED BAHIA GRASS IGE RAST QL: NORMAL
DEPRECATED BALD CYPRESS IGE RAST QL: NORMAL
DEPRECATED BERMUDA GRASS IGE RAST QL: ABNORMAL
DEPRECATED C HERBARUM IGE RAST QL: NORMAL
DEPRECATED C LUNATA IGE RAST QL: NORMAL
DEPRECATED CAT DANDER IGE RAST QL: NORMAL
DEPRECATED CATFISH IGE RAST QL: NORMAL
DEPRECATED CLAM IGE RAST QL: ABNORMAL
DEPRECATED CODFISH IGE RAST QL: NORMAL
DEPRECATED COMMON RAGWEED IGE RAST QL: NORMAL
DEPRECATED COTTONWOOD IGE RAST QL: ABNORMAL
DEPRECATED COW MILK IGE RAST QL: NORMAL
DEPRECATED CRAB IGE RAST QL: ABNORMAL
DEPRECATED CRAWFISH IGE RAST QL: NORMAL
DEPRECATED D FARINAE IGE RAST QL: NORMAL
DEPRECATED D PTERONYSS IGE RAST QL: NORMAL
DEPRECATED DOG DANDER IGE RAST QL: NORMAL
DEPRECATED EGG WHITE IGE RAST QL: NORMAL
DEPRECATED ELDER IGE RAST QL: ABNORMAL
DEPRECATED ENGL PLANTAIN IGE RAST QL: ABNORMAL
DEPRECATED FLOUNDER IGE RAST QL: NORMAL
DEPRECATED HORSE DANDER IGE RAST QL: NORMAL
DEPRECATED JOHNSON GRASS IGE RAST QL: ABNORMAL
DEPRECATED LOBSTER IGE RAST QL: NORMAL
DEPRECATED LONDON PLANE IGE RAST QL: NORMAL
DEPRECATED MUGWORT IGE RAST QL: ABNORMAL
DEPRECATED OAT IGE RAST QL: ABNORMAL
DEPRECATED OYSTER IGE RAST QL: NORMAL
DEPRECATED P NOTATUM IGE RAST QL: NORMAL
DEPRECATED PEANUT IGE RAST QL: NORMAL
DEPRECATED PECAN/HICK NUT IGE RAST QL: NORMAL
DEPRECATED PECAN/HICK TREE IGE RAST QL: NORMAL
DEPRECATED ROACH IGE RAST QL: ABNORMAL
DEPRECATED S ROSTRATA IGE RAST QL: NORMAL
DEPRECATED SALMON IGE RAST QL: NORMAL
DEPRECATED SALTWORT IGE RAST QL: ABNORMAL
DEPRECATED SCALLOP IGE RAST QL: ABNORMAL
DEPRECATED SHRIMP IGE RAST QL: ABNORMAL
DEPRECATED SILVER BIRCH IGE RAST QL: NORMAL
DEPRECATED SOYBEAN IGE RAST QL: NORMAL
DEPRECATED TIMOTHY IGE RAST QL: ABNORMAL
DEPRECATED TROUT IGE RAST QL: NORMAL
DEPRECATED TUNA IGE RAST QL: NORMAL
DEPRECATED WHEAT IGE RAST QL: ABNORMAL
DEPRECATED WHITE OAK IGE RAST QL: NORMAL
DEPRECATED WILLOW IGE RAST QL: ABNORMAL
DOG DANDER IGE QN: <0.1 KU/L
EGG WHITE IGE QN: <0.1 KU/L
ELDER IGE QN: 0.14 KU/L
ENGL PLANTAIN IGE QN: 0.12 KU/L
FLOUNDER IGE QN: <0.1 KU/L
HORSE DANDER IGE QN: <0.1 KU/L
JOHNSON GRASS IGE QN: 0.11 KU/L
LATEX CLASS: NORMAL
LOBSTER IGE QN: <0.1 KU/L
LONDON PLANE IGE QN: <0.1 KU/L
MUGWORT IGE QN: 0.13 KU/L
OAT IGE QN: 0.12 KU/L
OYSTER IGE QN: <0.1 KU/L
P NOTATUM IGE QN: <0.1 KU/L
PEANUT IGE QN: <0.1 KU/L
PECAN/HICK NUT IGE QN: <0.1 KU/L
PECAN/HICK TREE IGE QN: <0.1 KU/L
ROACH IGE QN: 0.32 KU/L
S ROSTRATA IGE QN: <0.1 KU/L
SALMON IGE QN: <0.1 KU/L
SALTWORT IGE QN: 0.2 KU/L
SCALLOP IGE QN: 0.11 KU/L
SHRIMP IGE QN: 0.23 KU/L
SILVER BIRCH IGE QN: <0.1 KU/L
SOYBEAN IGE QN: <0.1 KU/L
TIMOTHY IGE QN: 0.12 KU/L
TROUT IGE QN: <0.1 KU/L
TUNA IGE QN: <0.1 KU/L
WALNUT TREE CLASS: NORMAL
WHEAT IGE QN: 0.12 KU/L
WHITE OAK IGE QN: <0.1 KU/L
WHITE PINE CLASS: NORMAL
WILLOW IGE QN: 0.11 KU/L

## 2024-05-08 ENCOUNTER — PATIENT MESSAGE (OUTPATIENT)
Dept: ALLERGY | Facility: CLINIC | Age: 54
End: 2024-05-08
Payer: COMMERCIAL

## 2024-05-08 DIAGNOSIS — L25.9 CONTACT DERMATITIS, UNSPECIFIED CONTACT DERMATITIS TYPE, UNSPECIFIED TRIGGER: Primary | ICD-10-CM

## 2024-05-14 ENCOUNTER — OFFICE VISIT (OUTPATIENT)
Dept: DERMATOLOGY | Facility: CLINIC | Age: 54
End: 2024-05-14
Payer: COMMERCIAL

## 2024-05-14 DIAGNOSIS — Z91.018 MULTIPLE FOOD ALLERGIES: ICD-10-CM

## 2024-05-14 DIAGNOSIS — L50.9 HIVES: Primary | ICD-10-CM

## 2024-05-14 DIAGNOSIS — L29.9 ITCH: ICD-10-CM

## 2024-05-14 PROCEDURE — 1160F RVW MEDS BY RX/DR IN RCRD: CPT | Mod: CPTII,S$GLB,, | Performed by: DERMATOLOGY

## 2024-05-14 PROCEDURE — G2211 COMPLEX E/M VISIT ADD ON: HCPCS | Mod: S$GLB,,, | Performed by: DERMATOLOGY

## 2024-05-14 PROCEDURE — 99999 PR PBB SHADOW E&M-EST. PATIENT-LVL III: CPT | Mod: PBBFAC,,, | Performed by: DERMATOLOGY

## 2024-05-14 PROCEDURE — 1159F MED LIST DOCD IN RCRD: CPT | Mod: CPTII,S$GLB,, | Performed by: DERMATOLOGY

## 2024-05-14 PROCEDURE — 3044F HG A1C LEVEL LT 7.0%: CPT | Mod: CPTII,S$GLB,, | Performed by: DERMATOLOGY

## 2024-05-14 PROCEDURE — 99203 OFFICE O/P NEW LOW 30 MIN: CPT | Mod: S$GLB,,, | Performed by: DERMATOLOGY

## 2024-05-14 NOTE — PROGRESS NOTES
Subjective:      Patient ID:  Esa Yang is a 54 y.o. male who presents for   Chief Complaint   Patient presents with    Allergic Reaction     Allergic Reaction - Initial  Affected locations: left lower leg and left upper leg  Signs / symptoms: itching and dryness  Severity: mild to moderate  Timing: constant      Review of Systems   Constitutional: Negative.    HENT: Negative.     Respiratory: Negative.     Musculoskeletal: Negative.    Skin:  Positive for itching and dry skin.       Objective:   Physical Exam   Constitutional: He appears well-developed and well-nourished.   Neurological: He is alert and oriented to person, place, and time.   Psychiatric: He has a normal mood and affect.        Diagram Legend     Erythematous scaling macule/papule c/w actinic keratosis       Vascular papule c/w angioma      Pigmented verrucoid papule/plaque c/w seborrheic keratosis      Yellow umbilicated papule c/w sebaceous hyperplasia      Irregularly shaped tan macule c/w lentigo     1-2 mm smooth white papules consistent with Milia      Movable subcutaneous cyst with punctum c/w epidermal inclusion cyst      Subcutaneous movable cyst c/w pilar cyst      Firm pink to brown papule c/w dermatofibroma      Pedunculated fleshy papule(s) c/w skin tag(s)      Evenly pigmented macule c/w junctional nevus     Mildly variegated pigmented, slightly irregular-bordered macule c/w mildly atypical nevus      Flesh colored to evenly pigmented papule c/w intradermal nevus       Pink pearly papule/plaque c/w basal cell carcinoma      Erythematous hyperkeratotic cursted plaque c/w SCC      Surgical scar with no sign of skin cancer recurrence      Open and closed comedones      Inflammatory papules and pustules      Verrucoid papule consistent consistent with wart     Erythematous eczematous patches and plaques     Dystrophic onycholytic nail with subungual debris c/w onychomycosis     Umbilicated papule    Erythematous-base heme-crusted  tan verrucoid plaque consistent with inflamed seborrheic keratosis     Erythematous Silvery Scaling Plaque c/w Psoriasis     See annotation  Arms and face wo rash.    Assessment / Plan:        Hives  Pt reports only has had two episodes and only with new medication ingestion.  He reports no hives lately as he has been not taking the suspected med.  Pt to fu with prescriber and try alternative.  Rev'd that if only having hives post this medication then this is drug reaction hives and not usual clinical hives that requires long term management.  Patch testing not approp in this case as contact derm rarely presents as hives.    Itch  Feet on and off for a while, even before hives.  Cont claritin prn for this.  Poss relation to food allergies.  Discussed with patient the etiology and pathogenesis of the disease or skin lesion(s) and possible treatments and aggravators.    Patch testing not approp for this.    Multiple food allergies  -     Ambulatory referral/consult to Nutrition Services; Future; Expected date: 05/21/2024  Previous [facility] labs and or records and notes reviewed and considered for their impact on our clinical decision making today.  Discussed with patient the etiology and pathogenesis of the disease or skin lesion(s) and possible treatments and aggravators.    Pt to strictly avoid + allergens!           Follow up if symptoms worsen or fail to improve.

## 2024-05-17 DIAGNOSIS — Z96.652 S/P TOTAL KNEE REPLACEMENT, LEFT: Primary | ICD-10-CM

## 2024-05-22 ENCOUNTER — TELEPHONE (OUTPATIENT)
Dept: ADMINISTRATIVE | Facility: HOSPITAL | Age: 54
End: 2024-05-22
Payer: COMMERCIAL

## 2024-05-23 ENCOUNTER — HOSPITAL ENCOUNTER (OUTPATIENT)
Dept: RADIOLOGY | Facility: HOSPITAL | Age: 54
Discharge: HOME | End: 2024-05-23
Attending: PHYSICIAN ASSISTANT
Payer: COMMERCIAL

## 2024-05-23 ENCOUNTER — OFFICE VISIT (OUTPATIENT)
Dept: ORTHOPEDICS | Facility: CLINIC | Age: 54
End: 2024-05-23
Payer: COMMERCIAL

## 2024-05-23 DIAGNOSIS — Z96.652 S/P TOTAL KNEE REPLACEMENT, LEFT: ICD-10-CM

## 2024-05-23 DIAGNOSIS — Z96.652 S/P TOTAL KNEE REPLACEMENT, LEFT: Primary | ICD-10-CM

## 2024-05-23 PROCEDURE — 73562 X-RAY EXAM OF KNEE 3: CPT | Mod: TC,LT

## 2024-05-23 PROCEDURE — 3044F HG A1C LEVEL LT 7.0%: CPT | Mod: CPTII,S$GLB,, | Performed by: PHYSICIAN ASSISTANT

## 2024-05-23 PROCEDURE — 1159F MED LIST DOCD IN RCRD: CPT | Mod: CPTII,S$GLB,, | Performed by: PHYSICIAN ASSISTANT

## 2024-05-23 PROCEDURE — 99024 POSTOP FOLLOW-UP VISIT: CPT | Mod: S$GLB,,, | Performed by: PHYSICIAN ASSISTANT

## 2024-05-23 PROCEDURE — 73560 X-RAY EXAM OF KNEE 1 OR 2: CPT | Mod: 26,59,RT, | Performed by: RADIOLOGY

## 2024-05-23 PROCEDURE — 99999 PR PBB SHADOW E&M-EST. PATIENT-LVL III: CPT | Mod: PBBFAC,,, | Performed by: PHYSICIAN ASSISTANT

## 2024-05-23 PROCEDURE — 73562 X-RAY EXAM OF KNEE 3: CPT | Mod: 26,LT,, | Performed by: RADIOLOGY

## 2024-05-23 PROCEDURE — 73560 X-RAY EXAM OF KNEE 1 OR 2: CPT | Mod: TC,RT

## 2024-05-23 NOTE — PROGRESS NOTES
Esa Yang presents for 6 week post-operative visit following a left total knee arthroplasty performed by Dr. Ortiz on 04/11/2024    Exam:   There were no vitals taken for this visit.   Ambulating well with assistive device.  Incision is clean and dry without drainage or erythema.   ROM:0-120     radiographs reviewed today revealing a well fixed and aligned prosthesis.    A/P:  6 weeks s/p left total knee replacement    - The patient was advised to keep the incision clean and dry for the next 24 hours after which he may wash the area with antibacterial soap in the shower. Will not submerge until the incision is completely healed.   - Outpatient PT  complete  - Pain medication complete  - Reviewed antibiotic prophylaxis   - Follow up in 4 weeks with . Pt will call clinic with problems/concerns.

## 2024-05-28 ENCOUNTER — TELEPHONE (OUTPATIENT)
Dept: NEUROSURGERY | Facility: CLINIC | Age: 54
End: 2024-05-28
Payer: COMMERCIAL

## 2024-05-28 NOTE — TELEPHONE ENCOUNTER
Lm on pt. V/m that we do not see a referral or any recent imaging and to please call back to discuss if pt. Had outside imaging done and where and who is referring pt.;

## 2024-05-28 NOTE — TELEPHONE ENCOUNTER
----- Message from Saige Schuler sent at 5/28/2024 11:21 AM CDT -----  Regarding: appt  Contact: @ [phone]  Pt requesting a appointment for the following back pain per the patient a [facility] doctor referred him  ...Please call and adv @ [phone]

## 2024-06-13 ENCOUNTER — OFFICE VISIT (OUTPATIENT)
Dept: ORTHOPEDICS | Facility: CLINIC | Age: 54
End: 2024-06-13
Payer: COMMERCIAL

## 2024-06-13 ENCOUNTER — HOSPITAL ENCOUNTER (OUTPATIENT)
Dept: RADIOLOGY | Facility: HOSPITAL | Age: 54
Discharge: HOME | End: 2024-06-13
Payer: COMMERCIAL

## 2024-06-13 DIAGNOSIS — Z96.652 S/P TOTAL KNEE REPLACEMENT, LEFT: Primary | ICD-10-CM

## 2024-06-13 DIAGNOSIS — Z96.652 S/P TOTAL KNEE REPLACEMENT, LEFT: ICD-10-CM

## 2024-06-13 PROCEDURE — 99999 PR PBB SHADOW E&M-EST. PATIENT-LVL III: CPT | Mod: PBBFAC,,,

## 2024-06-13 PROCEDURE — 1159F MED LIST DOCD IN RCRD: CPT | Mod: CPTII,S$GLB,,

## 2024-06-13 PROCEDURE — 73560 X-RAY EXAM OF KNEE 1 OR 2: CPT | Mod: TC,RT

## 2024-06-13 PROCEDURE — 73560 X-RAY EXAM OF KNEE 1 OR 2: CPT | Mod: 26,59,RT, | Performed by: RADIOLOGY

## 2024-06-13 PROCEDURE — 3044F HG A1C LEVEL LT 7.0%: CPT | Mod: CPTII,S$GLB,,

## 2024-06-13 PROCEDURE — 1160F RVW MEDS BY RX/DR IN RCRD: CPT | Mod: CPTII,S$GLB,,

## 2024-06-13 PROCEDURE — 73562 X-RAY EXAM OF KNEE 3: CPT | Mod: 26,LT,, | Performed by: RADIOLOGY

## 2024-06-13 PROCEDURE — 99024 POSTOP FOLLOW-UP VISIT: CPT | Mod: S$GLB,,,

## 2024-06-13 NOTE — PROGRESS NOTES
Esa Yang presents for scheduled post-operative visit following a left total knee arthroplasty performed by Dr. Ortiz on 4/11/2024.  He presents to clinic due to feeling a clunk in the knee on extension.  He notes feeling it superior to his patella but denies any associated pain.    Exam:   There were no vitals taken for this visit.   Ambulating well with no assistive device.  Incision is clean and dry without drainage or erythema.   ROM:  0-125  Repeatable pop felt superior to the patella on extension.  Muscle knot noted directly lateral to the patellar tendon    Initial post-operative radiographs reviewed today revealing a well fixed and aligned prosthesis.    A/P:  2 months s/p left total knee arthroplasty    - Pop superior to the knee most likely attributed to quad tendon moving over top muscle knot.  Patient instructed to call if it becomes associated with pain.  - Outpatient PT ongoing: [facility]   - Reviewed antibiotic prophylaxis   - Follow up in 3 weeks with Dr. Ortiz. Pt will call clinic with problems/concerns.

## 2024-06-25 ENCOUNTER — OFFICE VISIT (OUTPATIENT)
Dept: UROLOGY | Facility: CLINIC | Age: 54
End: 2024-06-25
Payer: COMMERCIAL

## 2024-06-25 VITALS
SYSTOLIC BLOOD PRESSURE: 138 MMHG | HEIGHT: 72 IN | HEART RATE: 94 BPM | DIASTOLIC BLOOD PRESSURE: 89 MMHG | WEIGHT: 257.94 LBS | BODY MASS INDEX: 34.94 KG/M2

## 2024-06-25 DIAGNOSIS — N40.1 BENIGN PROSTATIC HYPERPLASIA WITH URINARY FREQUENCY: Primary | ICD-10-CM

## 2024-06-25 DIAGNOSIS — R35.0 BENIGN PROSTATIC HYPERPLASIA WITH URINARY FREQUENCY: Primary | ICD-10-CM

## 2024-06-25 DIAGNOSIS — R35.1 NOCTURIA: ICD-10-CM

## 2024-06-25 PROCEDURE — 3075F SYST BP GE 130 - 139MM HG: CPT | Mod: CPTII,S$GLB,, | Performed by: UROLOGY

## 2024-06-25 PROCEDURE — 3079F DIAST BP 80-89 MM HG: CPT | Mod: CPTII,S$GLB,, | Performed by: UROLOGY

## 2024-06-25 PROCEDURE — 99999 PR PBB SHADOW E&M-EST. PATIENT-LVL V: CPT | Mod: PBBFAC,,, | Performed by: UROLOGY

## 2024-06-25 PROCEDURE — 3044F HG A1C LEVEL LT 7.0%: CPT | Mod: CPTII,S$GLB,, | Performed by: UROLOGY

## 2024-06-25 PROCEDURE — 1160F RVW MEDS BY RX/DR IN RCRD: CPT | Mod: CPTII,S$GLB,, | Performed by: UROLOGY

## 2024-06-25 PROCEDURE — 99205 OFFICE O/P NEW HI 60 MIN: CPT | Mod: S$GLB,,, | Performed by: UROLOGY

## 2024-06-25 PROCEDURE — 3008F BODY MASS INDEX DOCD: CPT | Mod: CPTII,S$GLB,, | Performed by: UROLOGY

## 2024-06-25 PROCEDURE — 1159F MED LIST DOCD IN RCRD: CPT | Mod: CPTII,S$GLB,, | Performed by: UROLOGY

## 2024-06-25 RX ORDER — TADALAFIL 5 MG/1
5 TABLET ORAL DAILY
Qty: 90 TABLET | Refills: 3 | Status: SHIPPED | OUTPATIENT
Start: 2024-06-25 | End: 2024-06-25 | Stop reason: SDUPTHER

## 2024-06-25 RX ORDER — CIPROFLOXACIN 2 MG/ML
400 INJECTION, SOLUTION INTRAVENOUS
OUTPATIENT
Start: 2024-06-25

## 2024-06-25 RX ORDER — ACETAMINOPHEN 500 MG
1000 TABLET ORAL
OUTPATIENT
Start: 2024-06-25

## 2024-06-25 RX ORDER — TADALAFIL 5 MG/1
5 TABLET ORAL DAILY
Qty: 90 TABLET | Refills: 3 | Status: SHIPPED | OUTPATIENT
Start: 2024-06-25 | End: 2025-06-25

## 2024-06-25 RX ORDER — SODIUM CHLORIDE 9 MG/ML
INJECTION, SOLUTION INTRAVENOUS CONTINUOUS
OUTPATIENT
Start: 2024-06-25

## 2024-06-25 NOTE — PROGRESS NOTES
Subjective:      Patient ID: Esa Yang is a 54 y.o. male.    Chief Complaint: Annual Exam (Low testosterone )    Mr. Yang is a 54-year-old gentleman who was seen by me until 2019.  The patient has failed medical management with Cialis, Viagra and pep injections.  The patient is here requesting evaluation and treatment with inflatable penile prosthesis.  The patient has also tried  testosterone replacement therapy in the past however has not been on this medicine.  The patient had failed testosterone injections and gels.  The patient will add Cialis 5 mg daily to his regimen for voiding because he is at maximum dose of Flomax presently.  He has BPH symptoms with significant nocturia and frequency.  The patient had doubled his dose from 0.4-0.8 mg of Flomax with some improvement however not enough and therefore we will add Cialis 5 mg daily.  We will schedule patient for penile prosthesis.  Patient had knee replacement 3 months ago and find out that he had allergies to Eliquis as well as aspirin.  The patient is unable to take these medications for prevention of blood clotting.  Patient with worsening erectile dysfunction over the last 7-8 years.  The patient is still young and wishes to continue having sexual relations and requests placement of three-piece penile prosthesis.  Literature given to patient.  Patient understands risks, complications and benefits    Erectile Dysfunction  This is a chronic problem. The current episode started more than 1 year ago. The problem has been waxing and waning since onset. The nature of his difficulty is achieving erection, maintaining erection and penetration. He reports no anxiety, decreased libido or performance anxiety. He reports his erection duration to be less than 1 minute. Irritative symptoms include frequency and nocturia. Irritative symptoms do not include urgency. Obstructive symptoms include dribbling and a slower stream. Obstructive symptoms do not include  incomplete emptying, an intermittent stream, straining or a weak stream. Pertinent negatives include no chills, dysuria, genital pain, hematuria, hesitancy or inability to urinate. Nothing aggravates the symptoms. The treatment provided significant relief. He has been using treatment for 2 or more years. He has had no adverse reactions caused by medications. Risk factors include BPH.     Review of Systems   Constitutional:  Negative for activity change, appetite change, chills, diaphoresis, fatigue, fever and unexpected weight change.   HENT:  Negative for congestion, hearing loss, sinus pressure and trouble swallowing.    Eyes:  Negative for photophobia, pain, discharge and visual disturbance.   Respiratory:  Negative for apnea, cough and shortness of breath.    Cardiovascular:  Negative for chest pain, palpitations and leg swelling.   Gastrointestinal:  Negative for abdominal distention, abdominal pain, anal bleeding, blood in stool, constipation, diarrhea, nausea, rectal pain and vomiting.   Endocrine: Negative for cold intolerance, heat intolerance, polydipsia, polyphagia and polyuria.   Genitourinary:  Positive for frequency and nocturia. Negative for decreased libido, decreased urine volume, difficulty urinating, dysuria, enuresis, flank pain, genital sores, hematuria, hesitancy, incomplete emptying, penile discharge, penile pain, penile swelling, scrotal swelling, testicular pain and urgency.   Musculoskeletal:  Negative for arthralgias, back pain and myalgias.   Skin:  Negative for color change, pallor, rash and wound.   Allergic/Immunologic: Negative for environmental allergies, food allergies and immunocompromised state.   Neurological:  Negative for dizziness, seizures, weakness and headaches.   Hematological:  Negative for adenopathy. Does not bruise/bleed easily.   Psychiatric/Behavioral: Negative.  The patient is not nervous/anxious.       Objective:     Physical Exam  Vitals and nursing note reviewed.    Constitutional:       Appearance: He is well-developed.   HENT:      Head: Normocephalic and atraumatic.      Right Ear: There is no impacted cerumen.      Left Ear: There is no impacted cerumen.      Nose: Nose normal.      Mouth/Throat:      Pharynx: No oropharyngeal exudate or posterior oropharyngeal erythema.   Eyes:      General: No scleral icterus.        Right eye: No discharge.         Left eye: No discharge.      Extraocular Movements: Extraocular movements intact.      Conjunctiva/sclera: Conjunctivae normal.      Pupils: Pupils are equal, round, and reactive to light.   Cardiovascular:      Rate and Rhythm: Normal rate and regular rhythm.      Heart sounds: Normal heart sounds.   Pulmonary:      Effort: Pulmonary effort is normal.      Breath sounds: Rales (bilateral) present.   Abdominal:      General: Bowel sounds are normal. There is no distension.      Palpations: Abdomen is soft. There is no mass.      Tenderness: There is no abdominal tenderness. There is no right CVA tenderness, left CVA tenderness, guarding or rebound.      Hernia: No hernia is present.   Genitourinary:     Penis: Normal.       Testes: Normal.      Comments: Patient with no CVA tenderness, normal penis and scrotum.  Bladder nontender.  Musculoskeletal:         General: Normal range of motion.      Cervical back: Normal range of motion and neck supple.   Skin:     General: Skin is warm and dry.      Capillary Refill: Capillary refill takes less than 2 seconds.   Neurological:      Mental Status: He is alert and oriented to person, place, and time.      Deep Tendon Reflexes: Reflexes are normal and symmetric.   Psychiatric:         Mood and Affect: Mood normal.         Behavior: Behavior normal.         Thought Content: Thought content normal.         Judgment: Judgment normal.        Assessment:      1. Benign prostatic hyperplasia with urinary frequency    2. Nocturia      Plan:       There are no Patient Instructions on file  for this visit.

## 2024-07-03 ENCOUNTER — OFFICE VISIT (OUTPATIENT)
Dept: ORTHOPEDICS | Facility: CLINIC | Age: 54
End: 2024-07-03
Payer: COMMERCIAL

## 2024-07-03 VITALS — HEIGHT: 72 IN | BODY MASS INDEX: 36.85 KG/M2 | WEIGHT: 272.06 LBS

## 2024-07-03 DIAGNOSIS — Z96.652 S/P TOTAL KNEE REPLACEMENT, LEFT: Primary | ICD-10-CM

## 2024-07-03 PROCEDURE — 99999 PR PBB SHADOW E&M-EST. PATIENT-LVL III: CPT | Mod: PBBFAC,,, | Performed by: ORTHOPAEDIC SURGERY

## 2024-07-03 PROCEDURE — 3044F HG A1C LEVEL LT 7.0%: CPT | Mod: CPTII,S$GLB,, | Performed by: ORTHOPAEDIC SURGERY

## 2024-07-03 PROCEDURE — 99024 POSTOP FOLLOW-UP VISIT: CPT | Mod: S$GLB,,, | Performed by: ORTHOPAEDIC SURGERY

## 2024-07-03 PROCEDURE — 1159F MED LIST DOCD IN RCRD: CPT | Mod: CPTII,S$GLB,, | Performed by: ORTHOPAEDIC SURGERY

## 2024-07-03 NOTE — PROGRESS NOTES
Esa Yang is in for 3 month follow up for a  left TKA.  He is doing  well.  Mild pain in the knee.  He has some swelling with increased ambulationHe has been in PT  Exam demonstrates  A well developed male in no distress.  Alert and oriented.  Mood and affect are appropriate.    Knee incision is well healed.  ROM is 0-120.  The patella tracks well and there is no instability. The extremity is neurovascularly intact.    Xrays demonstrate a well fixed and positioned prosthesis.         No data to display                     No data to display                     No data to display                    4/1/2024    10:20 AM   Knee Society and Function Score   FINDINGS - KNEE SOCIETY SCORE 50   FINDINGS - KNEE SOCIETY FUNCTION SCORE 50            No data to display                Imp:Doing well  Stop PT  No work yet.  F/u in 3 months with xrays

## 2024-07-08 PROBLEM — N52.03 COMBINED ARTERIAL INSUFFICIENCY AND CORPORO-VENOUS OCCLUSIVE ERECTILE DYSFUNCTION: Status: ACTIVE | Noted: 2024-07-08

## 2024-07-17 ENCOUNTER — OFFICE VISIT (OUTPATIENT)
Dept: UROLOGY | Facility: CLINIC | Age: 54
End: 2024-07-17
Payer: COMMERCIAL

## 2024-07-17 VITALS
BODY MASS INDEX: 36.13 KG/M2 | HEIGHT: 72 IN | DIASTOLIC BLOOD PRESSURE: 76 MMHG | SYSTOLIC BLOOD PRESSURE: 132 MMHG | WEIGHT: 266.75 LBS | HEART RATE: 98 BPM

## 2024-07-17 DIAGNOSIS — Z98.890 POSTOPERATIVE STATE: Primary | ICD-10-CM

## 2024-07-17 PROCEDURE — 1160F RVW MEDS BY RX/DR IN RCRD: CPT | Mod: CPTII,S$GLB,, | Performed by: UROLOGY

## 2024-07-17 PROCEDURE — 3044F HG A1C LEVEL LT 7.0%: CPT | Mod: CPTII,S$GLB,, | Performed by: UROLOGY

## 2024-07-17 PROCEDURE — 99024 POSTOP FOLLOW-UP VISIT: CPT | Mod: S$GLB,,, | Performed by: UROLOGY

## 2024-07-17 PROCEDURE — 1159F MED LIST DOCD IN RCRD: CPT | Mod: CPTII,S$GLB,, | Performed by: UROLOGY

## 2024-07-17 PROCEDURE — 3075F SYST BP GE 130 - 139MM HG: CPT | Mod: CPTII,S$GLB,, | Performed by: UROLOGY

## 2024-07-17 PROCEDURE — 99999 PR PBB SHADOW E&M-EST. PATIENT-LVL III: CPT | Mod: PBBFAC,,, | Performed by: UROLOGY

## 2024-07-17 PROCEDURE — 3078F DIAST BP <80 MM HG: CPT | Mod: CPTII,S$GLB,, | Performed by: UROLOGY

## 2024-07-17 RX ORDER — HYDROCODONE BITARTRATE AND ACETAMINOPHEN 5; 325 MG/1; MG/1
1 TABLET ORAL EVERY 6 HOURS PRN
Qty: 28 TABLET | Refills: 0 | Status: SHIPPED | OUTPATIENT
Start: 2024-07-17 | End: 2024-07-24

## 2024-07-17 NOTE — PROGRESS NOTES
Esa Yang is a 54 y.o. male patient.  The patient is 9 days status post IPP placement.  Patient developed swelling of the scrotum starting on Saturday which is going down.  The patient has been using ice daily.  He has had chronic discomfort and requiring use of pain medication since surgery.  It has not required the use of all of his pain medicine over the 9 day.  However we will refill his medications as he will run out prior to his next visit.  1. Postoperative state      Past Medical History:   Diagnosis Date    Allergy     Appendicitis 02/13/2023    Arthritis     Back pain     Chest pain 11/14/2014    Fatigue 09/19/2018    GERD (gastroesophageal reflux disease)     Hesitancy 05/22/2018    Hyperlipidemia     Low testosterone in male 05/22/2018    Nausea and vomiting 02/13/2023    Palpitations 11/14/2014    Post-void dribbling 05/22/2018    S/P ACL reconstruction 08/24/2023    S/P appendectomy 02/28/2023    S/P hernia repair 02/28/2023    S/P L knee surgery, arthroscopic medial/lateral menisectomy, loose body removal (12/31/14) 01/14/2015    Slow urinary stream 05/22/2018    Umbilical hernia without obstruction and without gangrene 02/13/2023     Past Surgical History Pertinent Negatives:   Procedure Date Noted    CYSTOSCOPY 05/22/2018    HERNIA REPAIR 09/27/2017    PROSTATE SURGERY 05/22/2018     Scheduled Meds:  Continuous Infusions:  PRN Meds:    Review of patient's allergies indicates:   Allergen Reactions    Bactrim [sulfamethoxazole-trimethoprim] Blisters     SJS, Swelling    Eliquis [apixaban] Anaphylaxis and Hives     Hives and throat swelling    Sulfa (sulfonamide antibiotics) Swelling    Neosporin (neomycin-polymyx) Rash     Topical irritation     Aspirin Swelling    Latex, natural rubber Hives and Itching    Shellfish containing products Swelling     There are no hospital problems to display for this patient.    Blood pressure 132/76, pulse 98, height 6' (1.829 m), weight 121 kg (266 lb 12.1  oz).    Subjective:   Diet: Adequate intake.  Patient reports no nausea or vomiting.    Activity level: Returning to normal.    Pain control: Well controlled.      Objective:  Vital signs (most recent): Blood pressure 132/76, pulse 98, height 6' (1.829 m), weight 121 kg (266 lb 12.1 oz).  General appearance: Comfortable, well-appearing, in no acute distress and not in pain.    Lungs:  Normal respiratory rate and normal effort.  Breath sounds normal.    Heart: Normal rate.  Regular rhythm.  S1 normal.    Chest: Asymmetric chest wall expansion.    Abdomen: Abdomen is soft.    Bowel sounds:  Bowel sounds are normal.    Tenderness: There is no abdominal tenderness tenderness.    Wound:  Clean.    Extremities: There is normal range of motion.    Neurological: The patient is alert and oriented to person, place and time.  Normal strength.  No right hemiparesis, left hemiparesis or tongue deviation.  Pupils are equal, round, and reactive to light.       Assessment:   Post-op: 9 days.    Condition: In stable condition.     Plan:  Continue wound care as written.  Regular diet.  Start oral analgesics.    Refill pain medication    Follow up 3 weeks       Jamey Rodrigues MD  7/17/2024

## 2024-07-22 ENCOUNTER — TELEPHONE (OUTPATIENT)
Dept: UROLOGY | Facility: CLINIC | Age: 54
End: 2024-07-22
Payer: COMMERCIAL

## 2024-07-22 NOTE — TELEPHONE ENCOUNTER
Spoke to patient and he states he is having drainage real bad from the incision from procedure, wants to know if this is normal and should he be taking antibiotics

## 2024-07-23 ENCOUNTER — OFFICE VISIT (OUTPATIENT)
Dept: UROLOGY | Facility: CLINIC | Age: 54
End: 2024-07-23
Payer: COMMERCIAL

## 2024-07-23 VITALS
SYSTOLIC BLOOD PRESSURE: 132 MMHG | BODY MASS INDEX: 36.13 KG/M2 | HEART RATE: 98 BPM | DIASTOLIC BLOOD PRESSURE: 76 MMHG | WEIGHT: 266.75 LBS | HEIGHT: 72 IN

## 2024-07-23 DIAGNOSIS — Z98.890 POSTOPERATIVE STATE: Primary | ICD-10-CM

## 2024-07-23 PROCEDURE — 99499 UNLISTED E&M SERVICE: CPT | Mod: S$GLB,,, | Performed by: UROLOGY

## 2024-07-23 PROCEDURE — 99999 PR PBB SHADOW E&M-EST. PATIENT-LVL IV: CPT | Mod: PBBFAC,,, | Performed by: UROLOGY

## 2024-07-23 RX ORDER — CIPROFLOXACIN 500 MG/1
500 TABLET ORAL 2 TIMES DAILY
Qty: 28 TABLET | Refills: 0 | Status: SHIPPED | OUTPATIENT
Start: 2024-07-23 | End: 2024-08-06

## 2024-07-23 NOTE — PROGRESS NOTES
Esa Yang is a 54 y.o. male patient.   No diagnosis found.  Mr. Yang is 14 days status post IPP placement.  The patient developed significant swelling postoperatively in the scrotum and hematoma.  He was seen a week ago with attempt to decrease the up pump and to pump the pump however there was so much fluid and edema around the pump it was difficult to manipulate.  This manipulation may have cause hematoma to break down.  The patient yesterday began draining from the incision old blood.  He has no signs of infection and presented today for evaluation.  Past Medical History:   Diagnosis Date    Allergy     Appendicitis 02/13/2023    Arthritis     Back pain     Chest pain 11/14/2014    Fatigue 09/19/2018    GERD (gastroesophageal reflux disease)     Hesitancy 05/22/2018    Hyperlipidemia     Low testosterone in male 05/22/2018    Nausea and vomiting 02/13/2023    Palpitations 11/14/2014    Post-void dribbling 05/22/2018    S/P ACL reconstruction 08/24/2023    S/P appendectomy 02/28/2023    S/P hernia repair 02/28/2023    S/P L knee surgery, arthroscopic medial/lateral menisectomy, loose body removal (12/31/14) 01/14/2015    Slow urinary stream 05/22/2018    Umbilical hernia without obstruction and without gangrene 02/13/2023     Past Surgical History Pertinent Negatives:   Procedure Date Noted    CYSTOSCOPY 05/22/2018    HERNIA REPAIR 09/27/2017    PROSTATE SURGERY 05/22/2018     Scheduled Meds:  Continuous Infusions:  PRN Meds:    Review of patient's allergies indicates:   Allergen Reactions    Bactrim [sulfamethoxazole-trimethoprim] Blisters     SJS, Swelling    Eliquis [apixaban] Anaphylaxis and Hives     Hives and throat swelling    Sulfa (sulfonamide antibiotics) Swelling    Neosporin (neomycin-polymyx) Rash     Topical irritation     Aspirin Swelling    Latex, natural rubber Hives and Itching    Shellfish containing products Swelling     There are no hospital problems to display for this  patient.    Blood pressure 132/76, pulse 98, height 6' (1.829 m), weight 121 kg (266 lb 12.1 oz).    Subjective:   Diet: Adequate intake.  Patient reports no nausea or vomiting.    Activity level: Normal.    Pain control: Well controlled.    Objective:  Vital signs (most recent): Blood pressure 132/76, pulse 98, height 6' (1.829 m), weight 121 kg (266 lb 12.1 oz).  General appearance: Comfortable, well-appearing, in no acute distress and not in pain.    Lungs:  Normal respiratory rate and normal effort.  Breath sounds normal.    Heart: Normal rate.  Regular rhythm.  S1 normal.    Chest: Symmetric chest wall expansion.    Abdomen: Abdomen is flat.  There is rigidity.  No distension or ascites.    Bowel sounds:  Bowel sounds are absent.   Tenderness: There is no abdominal tenderness tenderness.    Wound:  Clean.  There is no drainage.    Extremities: There is normal range of motion.    Neurological: The patient is alert and oriented to person, place and time.  Normal strength.  No right hemiparesis, left hemiparesis or tongue deviation.  Pupils are equal, round, and reactive to light.     Assessment:   Post-op: 14 days.    Condition: In stable condition.     Plan:  Continue wound care as written.  Regular diet.  Start antibiotics.     Plan 2 weeks Cipro.  Patient to empty pump and pump penis up at least once daily.  Would prefer patient to rest with his penis empty at night and then can pump it up during the daytime.  As the swelling goes down the patient can inflate and deflate more often.  Patient not to soak in the tub and the void sexual activity at this time.  He will follow-up in 2 weeks.    Jamey Rodrigues MD  7/23/2024

## 2024-08-13 ENCOUNTER — OFFICE VISIT (OUTPATIENT)
Dept: UROLOGY | Facility: CLINIC | Age: 54
End: 2024-08-13
Payer: COMMERCIAL

## 2024-08-13 VITALS
BODY MASS INDEX: 36.13 KG/M2 | HEART RATE: 98 BPM | HEIGHT: 72 IN | SYSTOLIC BLOOD PRESSURE: 132 MMHG | DIASTOLIC BLOOD PRESSURE: 76 MMHG | WEIGHT: 266.75 LBS

## 2024-08-13 DIAGNOSIS — Z98.890 POSTOPERATIVE STATE: Primary | ICD-10-CM

## 2024-08-13 DIAGNOSIS — R35.1 NOCTURIA: ICD-10-CM

## 2024-08-13 PROCEDURE — 3044F HG A1C LEVEL LT 7.0%: CPT | Mod: CPTII,S$GLB,, | Performed by: UROLOGY

## 2024-08-13 PROCEDURE — 1160F RVW MEDS BY RX/DR IN RCRD: CPT | Mod: CPTII,S$GLB,, | Performed by: UROLOGY

## 2024-08-13 PROCEDURE — 3075F SYST BP GE 130 - 139MM HG: CPT | Mod: CPTII,S$GLB,, | Performed by: UROLOGY

## 2024-08-13 PROCEDURE — 3078F DIAST BP <80 MM HG: CPT | Mod: CPTII,S$GLB,, | Performed by: UROLOGY

## 2024-08-13 PROCEDURE — 99999 PR PBB SHADOW E&M-EST. PATIENT-LVL III: CPT | Mod: PBBFAC,,, | Performed by: UROLOGY

## 2024-08-13 PROCEDURE — 1159F MED LIST DOCD IN RCRD: CPT | Mod: CPTII,S$GLB,, | Performed by: UROLOGY

## 2024-08-13 PROCEDURE — 99024 POSTOP FOLLOW-UP VISIT: CPT | Mod: S$GLB,,, | Performed by: UROLOGY

## 2024-08-13 NOTE — PROGRESS NOTES
Subjective:      Patient ID: Esa Yang is a 54 y.o. male.    Chief Complaint: Postoperative state    Mr. Yang is a 53 yo AAM who was 5 weeks status post IPP placement.  The patient has healed well and having no issues obtaining the erection or deflating the erection.  The patient was hoping that the pump will settle down further into the scrotum.  The he still has some palpable tubing along the left side of his penis going up to the reservoir.  This may continue to settled down although most of the inflammation has resolved.    Follow-up  This is a chronic problem. The current episode started more than 1 year ago. The problem has been unchanged. Pertinent negatives include no abdominal pain, anorexia, arthralgias, change in bowel habit, chest pain, chills, congestion, diaphoresis, fatigue, fever, headaches, joint swelling, myalgias, nausea, neck pain, numbness, rash, sore throat, swollen glands, urinary symptoms, vertigo, visual change, vomiting or weakness. Nothing aggravates the symptoms. He has tried nothing (IPP Placement) for the symptoms. The treatment provided significant relief.     Review of Systems   Constitutional:  Negative for chills, diaphoresis, fatigue and fever.   HENT:  Negative for congestion and sore throat.    Eyes: Negative.    Respiratory: Negative.     Cardiovascular:  Negative for chest pain.   Gastrointestinal:  Negative for abdominal pain, anorexia, change in bowel habit, nausea and vomiting.   Genitourinary:         Pain has resolved.   Musculoskeletal:  Negative for arthralgias, joint swelling, myalgias and neck pain.   Skin:  Negative for rash.   Neurological:  Negative for vertigo, weakness, numbness and headaches.      Objective:     Physical Exam  Vitals reviewed.   Constitutional:       General: He is not in acute distress.     Appearance: Normal appearance. He is not ill-appearing, toxic-appearing or diaphoretic.   HENT:      Head: Normocephalic and atraumatic.      Right  Ear: External ear normal.      Left Ear: External ear normal.      Nose: Nose normal. No congestion or rhinorrhea.      Mouth/Throat:      Mouth: Mucous membranes are moist.      Pharynx: Oropharynx is clear. No oropharyngeal exudate or posterior oropharyngeal erythema.   Eyes:      General: No scleral icterus.        Right eye: No discharge.         Left eye: No discharge.      Extraocular Movements: Extraocular movements intact.      Conjunctiva/sclera: Conjunctivae normal.      Pupils: Pupils are equal, round, and reactive to light.   Cardiovascular:      Rate and Rhythm: Normal rate and regular rhythm.      Pulses: Normal pulses.      Heart sounds: Normal heart sounds.   Pulmonary:      Effort: Pulmonary effort is normal.      Breath sounds: Normal breath sounds.   Abdominal:      General: Abdomen is flat. Bowel sounds are normal. There is no distension.      Palpations: Abdomen is soft. There is no mass.      Tenderness: There is no abdominal tenderness. There is no right CVA tenderness, left CVA tenderness, guarding or rebound.      Hernia: No hernia is present.   Genitourinary:     Penis: Normal.       Testes: Normal.   Musculoskeletal:      Cervical back: Normal range of motion and neck supple.      Right lower leg: No edema.      Left lower leg: No edema.   Skin:     Capillary Refill: Capillary refill takes less than 2 seconds.   Neurological:      General: No focal deficit present.      Mental Status: He is alert and oriented to person, place, and time.   Psychiatric:         Mood and Affect: Mood normal.         Behavior: Behavior normal.         Thought Content: Thought content normal.         Judgment: Judgment normal.        Assessment:      1. Postoperative state    2. Nocturia      Plan:     Patient Instructions   Okay to use prosthesis.    Inflate and deflate daily.  Continue daily Cialis 5 mg a day for BPH this will also help engorged glans of penis.

## 2024-08-13 NOTE — PATIENT INSTRUCTIONS
Okay to use prosthesis.    Inflate and deflate daily.  Continue daily Cialis 5 mg a day for BPH this will also help engorged glans of penis.

## 2024-08-14 ENCOUNTER — PATIENT MESSAGE (OUTPATIENT)
Dept: UROLOGY | Facility: CLINIC | Age: 54
End: 2024-08-14
Payer: COMMERCIAL

## 2024-09-17 ENCOUNTER — OFFICE VISIT (OUTPATIENT)
Dept: ORTHOPEDICS | Facility: CLINIC | Age: 54
End: 2024-09-17
Payer: COMMERCIAL

## 2024-09-17 ENCOUNTER — HOSPITAL ENCOUNTER (OUTPATIENT)
Dept: RADIOLOGY | Facility: HOSPITAL | Age: 54
Discharge: HOME | End: 2024-09-17
Payer: COMMERCIAL

## 2024-09-17 DIAGNOSIS — Z96.652 S/P TOTAL KNEE REPLACEMENT, LEFT: Primary | ICD-10-CM

## 2024-09-17 DIAGNOSIS — Z96.652 S/P TOTAL KNEE REPLACEMENT, LEFT: ICD-10-CM

## 2024-09-17 PROCEDURE — 1159F MED LIST DOCD IN RCRD: CPT | Mod: CPTII,S$GLB,,

## 2024-09-17 PROCEDURE — 1160F RVW MEDS BY RX/DR IN RCRD: CPT | Mod: CPTII,S$GLB,,

## 2024-09-17 PROCEDURE — 73562 X-RAY EXAM OF KNEE 3: CPT | Mod: 26,LT,, | Performed by: RADIOLOGY

## 2024-09-17 PROCEDURE — 73560 X-RAY EXAM OF KNEE 1 OR 2: CPT | Mod: TC,RT

## 2024-09-17 PROCEDURE — 73560 X-RAY EXAM OF KNEE 1 OR 2: CPT | Mod: 26,59,RT, | Performed by: RADIOLOGY

## 2024-09-17 PROCEDURE — 99213 OFFICE O/P EST LOW 20 MIN: CPT | Mod: S$GLB,,,

## 2024-09-17 PROCEDURE — 99999 PR PBB SHADOW E&M-EST. PATIENT-LVL III: CPT | Mod: PBBFAC,,,

## 2024-09-17 PROCEDURE — 73562 X-RAY EXAM OF KNEE 3: CPT | Mod: TC,LT

## 2024-09-17 PROCEDURE — 3044F HG A1C LEVEL LT 7.0%: CPT | Mod: CPTII,S$GLB,,

## 2024-09-17 NOTE — PROGRESS NOTES
SUBJECTIVE:     Chief Complaint & History of Present Illness:  Esa Yang is a 54 y.o. male with a past surgical history of left TKA done by Dr. Ortiz on 04/11/2024 who is seen here today with a complaint of left knee soreness and pain. The pain has been present for about 3 weeks following stepping into a hole with the left leg. The patient describes the pain as a mild-to-moderate dull ache located in the diffuse knee. The pain is worse with standing and walking and improved by acetaminophen and rest. The patient notes mild effusion but no associated knee giving out, knee locking.      Past Medical History:   Diagnosis Date    Allergy     Appendicitis 02/13/2023    Arthritis     Back pain     Chest pain 11/14/2014    Fatigue 09/19/2018    GERD (gastroesophageal reflux disease)     Hesitancy 05/22/2018    Hyperlipidemia     Low testosterone in male 05/22/2018    Nausea and vomiting 02/13/2023    Palpitations 11/14/2014    Post-void dribbling 05/22/2018    S/P ACL reconstruction 08/24/2023    S/P appendectomy 02/28/2023    S/P hernia repair 02/28/2023    S/P L knee surgery, arthroscopic medial/lateral menisectomy, loose body removal (12/31/14) 01/14/2015    Slow urinary stream 05/22/2018    Umbilical hernia without obstruction and without gangrene 02/13/2023       Past Surgical History:   Procedure Laterality Date    ANKLE SURGERY Left     ARTHROSCOPIC REPAIR OF ROTATOR CUFF OF SHOULDER Left 12/16/2022    Procedure: REPAIR, ROTATOR CUFF, ARTHROSCOPIC;  Surgeon: Montana Russ Jr., MD;  Location: [facility] OR;  Service: Orthopedics;  Laterality: Left;  need [facility] notified cc    ARTHROSCOPY OF SHOULDER WITH DECOMPRESSION OF SUBACROMIAL SPACE Left 12/16/2022    Procedure: ARTHROSCOPY, SHOULDER, WITH SUBACROMIAL SPACE DECOMPRESSION;  Surgeon: Montana Russ Jr., MD;  Location: [facility] OR;  Service: Orthopedics;  Laterality: Left;    BACK SURGERY  01/30/2017    INJECTION, TENDON SHEATH OR LIGAMENT,  1 TENDON SHEATH OR LIGAMENT Right 12/16/2022    Procedure: INJECTION,TENDON SHEATH OR LIGAMENT,1 TENDON SHEATH OR LIGAMENT;  Surgeon: Montana Russ Jr., MD;  Location: [facility] OR;  Service: Orthopedics;  Laterality: Right;    INSERTION OF INFLATABLE PENILE PROSTHESIS N/A 7/8/2024    Procedure: INSERTION, PENILE PROSTHESIS, INFLATABLE;  Surgeon: Jamey Rodrigues MD;  Location: [facility] OR;  Service: Urology;  Laterality: N/A;    KNEE SURGERY      x 5    LAPAROSCOPIC APPENDECTOMY N/A 2/13/2023    Procedure: APPENDECTOMY, LAPAROSCOPIC;  Surgeon: Babs Cano MD;  Location: [facility] OR;  Service: General;  Laterality: N/A;    REPAIR, HERNIA, UMBILICAL  2/13/2023    Procedure: REPAIR, HERNIA, UMBILICAL;  Surgeon: Babs Cano MD;  Location: [facility] OR;  Service: General;;    TOTAL KNEE REPLACEMENT USING COMPUTER NAVIGATION Left 4/11/2024    Procedure: ARTHROPLASTY, KNEE, TOTAL, MADY COMPUTER-ASSISTED NAVIGATION: LEFT: SAME DAY;  Surgeon: Romeo Ortiz MD;  Location: [facility] OR;  Service: Orthopedics;  Laterality: Left;    VASECTOMY         Family History   Problem Relation Name Age of Onset    Hypertension Father      Prostate cancer Father      Kidney cancer Neg Hx         Review of patient's allergies indicates:   Allergen Reactions    Bactrim [sulfamethoxazole-trimethoprim] Blisters     SJS, Swelling    Eliquis [apixaban] Anaphylaxis and Hives     Hives and throat swelling    Sulfa (sulfonamide antibiotics) Swelling    Neosporin (neomycin-polymyx) Rash     Topical irritation     Aspirin Swelling    Latex, natural rubber Hives and Itching    Shellfish containing products Swelling           Current Outpatient Medications:     acetaminophen (TYLENOL) 650 MG TbSR, Take 1 tablet (650 mg total) by mouth every 8 (eight) hours., Disp: 120 tablet, Rfl: 0    cetirizine (ZYRTEC) 10 MG tablet, Take 1 tablet (10 mg total) by mouth once daily., Disp: 30 tablet, Rfl: 0    EPINEPHrine (EPIPEN) 0.3 mg/0.3 mL AtIn, Inject 0.3  mLs (0.3 mg total) into the muscle as needed., Disp: 1 each, Rfl: 0    tadalafiL (CIALIS) 5 MG tablet, Take 1 tablet (5 mg total) by mouth once daily. For BPH, Disp: 90 tablet, Rfl: 3    famotidine (PEPCID) 40 MG tablet, Take 1 tablet (40 mg total) by mouth once daily. for 5 days, Disp: 5 tablet, Rfl: 0    tamsulosin (FLOMAX) 0.4 mg Cap, Take 1 capsule (0.4 mg total) by mouth once daily. (Patient taking differently: Take 0.4 mg by mouth once daily. Pt still taking), Disp: 30 capsule, Rfl: 11  No current facility-administered medications for this visit.    Facility-Administered Medications Ordered in Other Visits:     triamcinolone acetonide injection 40 mg, 40 mg, Intra-articular, , Sergey Kruse MD, 40 mg at 11/11/14 1327      Review of Systems:  ROS:  The updated medical history is in the chart and has been reviewed. A review of systems is updated and there is no reported vision changes, ear/nose/mouth/throat complaints, chest pain, shortness of breath, abdominal pain, urological complaints, fevers or chills, psychiatric complaints. Musculoskeletal and neurologcial symptoms are as documented. All other systems are negative.      OBJECTIVE:     PHYSICAL EXAM:  There were no vitals taken for this visit.  General: Pleasant, cooperative, NAD.  HEENT: NCAT, sclera nonicteric.  Lungs: Respirations are equal and unlabored.   Abdomen: Soft and non-tender.  CV: 2+ bilateral upper and lower extremity pulses.  Neuro: Sensation intact to light touch.  Skin: Intact throughout LE with no rashes, erythema, or lesions.  Extremities: No LE edema, NVI lower extremities. normal gait.    left Knee Exam:  Knee Range of Motion:  0-125   Effusion:  Mild  Condition of skin: intact  Location of tenderness:  Mild IT band tenderness  Strength: 5/5 quadriceps strength, 5/5 gastroc-soleus strength, 5/5 hamstring strength, and 5/5 tibialis anterior strength  Stability: stable to testing  Knee Alignment: normal    right Knee Exam:  Knee  Range of Motion:  0-125   Effusion: none  Condition of skin: intact  Location of tenderness: None   Strength: 5/5 quadriceps strength, 5/5 gastroc-soleus strength, 5/5 hamstring strength, and 5/5 tibialis anterior strength  Knee alignment: normal  Stability: stable to testing      RADIOGRAPHS:  X-rays of the left knee taken today personally reviewed. Imaging reveals well-aligned prosthesis with no acute fractures, dislocations, or subsidence.      ASSESSMENT:       ICD-10-CM ICD-9-CM   1. S/P total knee replacement, left  Z96.652 V43.65       PLAN:     We discussed with the patient at length all the different treatment options available including anti-inflammatories, acetaminophen, rest, ice, knee strengthening exercise, occasional cortisone injections for temporary relief, Viscosupplimentation injections, arthroscopic debridement, osteotomy, and finally knee arthroplasty.     Patient instructed to treat IT band tenderness conservatively via rest, foam rolling, and stretching.  I instructed the patient that the pain following stepping into to the hole should continue to slowly subside.  Continue taking acetaminophen 650 mg as needed for pain.  Call if the symptoms worsened or continue.        DISCLAIMER: This note was prepared with X[employer]IM[employer] voice recognition transcription software. Garbled syntax, mangled pronouns, and other bizarre constructions may be attributed to that software system.    Sergey Macario Jr., ILDEFONSO

## 2024-09-30 ENCOUNTER — PATIENT MESSAGE (OUTPATIENT)
Dept: ORTHOPEDICS | Facility: CLINIC | Age: 54
End: 2024-09-30
Payer: COMMERCIAL

## 2024-10-01 ENCOUNTER — PATIENT MESSAGE (OUTPATIENT)
Dept: ORTHOPEDICS | Facility: CLINIC | Age: 54
End: 2024-10-01
Payer: COMMERCIAL

## 2024-10-02 ENCOUNTER — PATIENT MESSAGE (OUTPATIENT)
Dept: ORTHOPEDICS | Facility: CLINIC | Age: 54
End: 2024-10-02
Payer: COMMERCIAL

## 2024-11-27 ENCOUNTER — HOSPITAL ENCOUNTER (EMERGENCY)
Facility: HOSPITAL | Age: 54
Discharge: HOME | End: 2024-11-27
Attending: STUDENT IN AN ORGANIZED HEALTH CARE EDUCATION/TRAINING PROGRAM
Payer: COMMERCIAL

## 2024-11-27 VITALS
WEIGHT: 265 LBS | HEIGHT: 72 IN | DIASTOLIC BLOOD PRESSURE: 101 MMHG | OXYGEN SATURATION: 98 % | HEART RATE: 95 BPM | SYSTOLIC BLOOD PRESSURE: 188 MMHG | RESPIRATION RATE: 19 BRPM | TEMPERATURE: 98 F | BODY MASS INDEX: 35.89 KG/M2

## 2024-11-27 DIAGNOSIS — H16.133 PHOTOKERATITIS OF BOTH EYES: Primary | ICD-10-CM

## 2024-11-27 PROCEDURE — 25000003 PHARM REV CODE 250: Mod: ER | Performed by: STUDENT IN AN ORGANIZED HEALTH CARE EDUCATION/TRAINING PROGRAM

## 2024-11-27 PROCEDURE — 99284 EMERGENCY DEPT VISIT MOD MDM: CPT | Mod: ER

## 2024-11-27 RX ORDER — ERYTHROMYCIN 5 MG/G
OINTMENT OPHTHALMIC
Qty: 3.5 G | Refills: 0 | Status: SHIPPED | OUTPATIENT
Start: 2024-11-27

## 2024-11-27 RX ORDER — PROPARACAINE HYDROCHLORIDE 5 MG/ML
2 SOLUTION/ DROPS OPHTHALMIC
Status: COMPLETED | OUTPATIENT
Start: 2024-11-27 | End: 2024-11-27

## 2024-11-27 RX ORDER — KETOROLAC TROMETHAMINE 5 MG/ML
1 SOLUTION OPHTHALMIC 3 TIMES DAILY
Qty: 5 ML | Refills: 0 | Status: SHIPPED | OUTPATIENT
Start: 2024-11-27 | End: 2024-12-07

## 2024-11-27 RX ORDER — HYDROCODONE BITARTRATE AND ACETAMINOPHEN 7.5; 325 MG/1; MG/1
1 TABLET ORAL EVERY 6 HOURS PRN
Qty: 6 TABLET | Refills: 0 | Status: SHIPPED | OUTPATIENT
Start: 2024-11-27

## 2024-11-27 RX ADMIN — PROPARACAINE HYDROCHLORIDE 2 DROP: 5 SOLUTION/ DROPS OPHTHALMIC at 10:11

## 2024-11-27 RX ADMIN — FLUORESCEIN SODIUM 1 EACH: 1 STRIP OPHTHALMIC at 10:11

## 2024-11-27 NOTE — Clinical Note
"Esa Marvinflower Yang was seen and treated in our emergency department on 11/27/2024.  He may return to work on 11/30/2024.       If you have any questions or concerns, please don't hesitate to call.      Adrian Patterson MD"

## 2024-11-28 NOTE — ED PROVIDER NOTES
ED Provider Note - 11/27/2024    History     Chief Complaint   Patient presents with    Eye Burn     Reports to ED c c/o flash burn from welding. States feels like eyes are being scratched. Denies any changes in vision       HPI     Esa Yang is a 54 y.o. year old male with past medical and surgical history as seen below, presenting with chief complaint of bilateral eye pain after an arc flash when he was near someone welding.  Has had similar issue in the past.  No other injuries or complaints.        Past Medical History:   Diagnosis Date    Allergy     Appendicitis 02/13/2023    Arthritis     Back pain     Chest pain 11/14/2014    Fatigue 09/19/2018    GERD (gastroesophageal reflux disease)     Hesitancy 05/22/2018    Hyperlipidemia     Low testosterone in male 05/22/2018    Nausea and vomiting 02/13/2023    Palpitations 11/14/2014    Post-void dribbling 05/22/2018    S/P ACL reconstruction 08/24/2023    S/P appendectomy 02/28/2023    S/P hernia repair 02/28/2023    S/P L knee surgery, arthroscopic medial/lateral menisectomy, loose body removal (12/31/14) 01/14/2015    Slow urinary stream 05/22/2018    Umbilical hernia without obstruction and without gangrene 02/13/2023     Past Surgical History:   Procedure Laterality Date    ANKLE SURGERY Left     ARTHROSCOPIC REPAIR OF ROTATOR CUFF OF SHOULDER Left 12/16/2022    Procedure: REPAIR, ROTATOR CUFF, ARTHROSCOPIC;  Surgeon: Montana Russ Jr., MD;  Location: [facility] OR;  Service: Orthopedics;  Laterality: Left;  need opus system Rastafari notified cc    ARTHROSCOPY OF SHOULDER WITH DECOMPRESSION OF SUBACROMIAL SPACE Left 12/16/2022    Procedure: ARTHROSCOPY, SHOULDER, WITH SUBACROMIAL SPACE DECOMPRESSION;  Surgeon: Montana Russ Jr., MD;  Location: [facility] OR;  Service: Orthopedics;  Laterality: Left;    BACK SURGERY  01/30/2017    INJECTION, TENDON SHEATH OR LIGAMENT, 1 TENDON SHEATH OR LIGAMENT Right 12/16/2022    Procedure: INJECTION,TENDON SHEATH OR  LIGAMENT,1 TENDON SHEATH OR LIGAMENT;  Surgeon: Montana Russ Jr., MD;  Location: [facility] OR;  Service: Orthopedics;  Laterality: Right;    INSERTION OF INFLATABLE PENILE PROSTHESIS N/A 7/8/2024    Procedure: INSERTION, PENILE PROSTHESIS, INFLATABLE;  Surgeon: Jamey Rodrigues MD;  Location: [facility] OR;  Service: Urology;  Laterality: N/A;    KNEE SURGERY      x 5    LAPAROSCOPIC APPENDECTOMY N/A 2/13/2023    Procedure: APPENDECTOMY, LAPAROSCOPIC;  Surgeon: Babs Cano MD;  Location: [facility] OR;  Service: General;  Laterality: N/A;    REPAIR, HERNIA, UMBILICAL  2/13/2023    Procedure: REPAIR, HERNIA, UMBILICAL;  Surgeon: Babs Cano MD;  Location: [facility] OR;  Service: General;;    TOTAL KNEE REPLACEMENT USING COMPUTER NAVIGATION Left 4/11/2024    Procedure: ARTHROPLASTY, KNEE, TOTAL, MADY COMPUTER-ASSISTED NAVIGATION: LEFT: SAME DAY;  Surgeon: Romeo Ortiz MD;  Location: [facility] OR;  Service: Orthopedics;  Laterality: Left;    VASECTOMY           Family History   Problem Relation Name Age of Onset    Hypertension Father      Prostate cancer Father      Kidney cancer Neg Hx       Social History     Tobacco Use    Smoking status: Some Days     Types: Cigars     Passive exposure: Current    Smokeless tobacco: Never    Tobacco comments:     Cigar every once a while   Substance Use Topics    Alcohol use: Yes     Alcohol/week: 2.0 standard drinks of alcohol     Types: 2 Cans of beer per week     Comment: 2/wk    Drug use: Never     Social Drivers of Health with Concerns     Tobacco Use: High Risk (11/27/2024)    Patient History     Smoking Tobacco Use: Some Days     Smokeless Tobacco Use: Never     Passive Exposure: Current   Physical Activity: Unknown (2/5/2024)    Exercise Vital Sign     Days of Exercise per Week: 1 day     Minutes of Exercise per Session: Patient declined   Depression: Not on file   Utilities: Not on file   Health Literacy: Not on file   Social Isolation: Not on file      Review of  patient's allergies indicates:   Allergen Reactions    Bactrim [sulfamethoxazole-trimethoprim] Blisters     SJS, Swelling    Eliquis [apixaban] Anaphylaxis and Hives     Hives and throat swelling    Sulfa (sulfonamide antibiotics) Swelling    Neosporin (neomycin-polymyx) Rash     Topical irritation     Aspirin Swelling    Latex, natural rubber Hives and Itching    Shellfish containing products Swelling       Review of Systems     A full Review of Systems (ROS) was performed and was negative unless otherwise stated in the HPI.      Physical Exam     Vitals:    11/27/24 2148 11/27/24 2306   BP: (!) 188/101    BP Location: Left arm    Pulse: 95    Resp: 19    Temp: 98.2 °F (36.8 °C)    TempSrc: Oral    SpO2: 98%    Weight: 120.2 kg (265 lb) 120.2 kg (265 lb)   Height:  6' (1.829 m)        Physical Exam    Nursing note and vitals reviewed.  Constitutional: He appears well-developed and well-nourished. No distress.   HENT:   Head: Normocephalic and atraumatic.   Right Ear: External ear normal.   Left Ear: External ear normal.   Nose: Nose normal.   Eyes: EOM are normal. Pupils are equal, round, and reactive to light. Right conjunctiva is injected. Left conjunctiva is injected.   Scattered fluorescein uptake adjacent to the midline of the bilateral eyes.   Neck: Neck supple.   Normal range of motion.  Cardiovascular:  Normal rate and regular rhythm.           Pulmonary/Chest: Breath sounds normal. No respiratory distress.   Musculoskeletal:         General: No edema. Normal range of motion.      Cervical back: Normal range of motion and neck supple.     Neurological: He is alert and oriented to person, place, and time. He has normal strength. No cranial nerve deficit or sensory deficit.   Skin: Skin is warm and dry. No rash noted.   Psychiatric: He has a normal mood and affect. Thought content normal.         Lab Results- Independently reviewed by myself      Labs Reviewed - No data to display        Imaging     Imaging  Results    None                    ED Course         Procedures         Orders Placed This Encounter    proparacaine 0.5 % ophthalmic solution 2 drop    fluorescein ophthalmic strip 1 each    ketorolac 0.5% (ACULAR) 0.5 % Drop    erythromycin (ROMYCIN) ophthalmic ointment    HYDROcodone-acetaminophen (NORCO) 7.5-325 mg per tablet                      Medical Decision Making       The patient's list of active medical problems, social history, medications, and allergies as documented per RN staff has been reviewed.           Medical Decision Making  The Pt presents with eye pain likely due to photo keratitis seen on fluorescein staining of eye. The Pt is otherwise well-appearing without evidence of retained foreign body, corneal ulcer, globe rupture, or superimposed infection. Prescribed topical NSAID and antibiotic and instructed the Pt to follow up closely with ophthalmology.      Problems Addressed:  Photokeratitis of both eyes: acute illness or injury    Risk  Prescription drug management.                    ED Prescriptions       Medication Sig Dispense Start Date End Date Auth. Provider    ketorolac 0.5% (ACULAR) 0.5 % Drop Place 1 drop into both eyes 3 (three) times daily. for 10 days 5 mL 11/27/2024 12/7/2024 Adrian Patterson MD    erythromycin (ROMYCIN) ophthalmic ointment Place a 1/2 inch ribbon of ointment into the lower eyelid. 3.5 g 11/27/2024 -- Adrian Patterson MD    HYDROcodone-acetaminophen (NORCO) 7.5-325 mg per tablet Take 1 tablet by mouth every 6 (six) hours as needed for Pain. 6 tablet 11/27/2024 -- Adrian Patterson MD              Clinical Impression       Follow-up Information       Follow up With Specialties Details Why Contact [facility] OPHTHALMOLOGY Ophthalmology Schedule an appointment as soon as possible for a visit in 5 days For follow-up on today's visit., As needed [address] [facility] [address]  [phone]    [facility] - Emergency Dept Emergency Medicine Go to   As needed, If symptoms worsen [address] [facility]  Emergency Department  [facility] [id number] [phone]            Referrals:  No orders of the defined types were placed in this encounter.      Disposition   ED Disposition Condition    Discharge Stable              Final diagnoses:  [H16.133] Photokeratitis of both eyes (Primary)        Adrian Patterson MD        11/28/2024          DISCLAIMER: This note was prepared with [employer] voice recognition transcription software. Garbled syntax, mangled pronouns, and other bizarre constructions may be attributed to that software system.       Adrian Patterson MD  11/28/24 0522

## 2024-12-03 ENCOUNTER — OFFICE VISIT (OUTPATIENT)
Dept: SPORTS MEDICINE | Facility: CLINIC | Age: 54
End: 2024-12-03
Payer: COMMERCIAL

## 2024-12-03 ENCOUNTER — HOSPITAL ENCOUNTER (OUTPATIENT)
Dept: RADIOLOGY | Facility: HOSPITAL | Age: 54
Discharge: HOME | End: 2024-12-03
Attending: ORTHOPAEDIC SURGERY
Payer: COMMERCIAL

## 2024-12-03 VITALS
DIASTOLIC BLOOD PRESSURE: 92 MMHG | HEIGHT: 72 IN | SYSTOLIC BLOOD PRESSURE: 142 MMHG | WEIGHT: 279.13 LBS | BODY MASS INDEX: 37.81 KG/M2 | HEART RATE: 90 BPM

## 2024-12-03 DIAGNOSIS — M25.561 PAIN IN BOTH KNEES, UNSPECIFIED CHRONICITY: ICD-10-CM

## 2024-12-03 DIAGNOSIS — M25.562 PAIN IN BOTH KNEES, UNSPECIFIED CHRONICITY: ICD-10-CM

## 2024-12-03 DIAGNOSIS — G89.29 CHRONIC PAIN OF RIGHT KNEE: Primary | ICD-10-CM

## 2024-12-03 DIAGNOSIS — M25.561 CHRONIC PAIN OF RIGHT KNEE: Primary | ICD-10-CM

## 2024-12-03 PROCEDURE — 73564 X-RAY EXAM KNEE 4 OR MORE: CPT | Mod: TC,50

## 2024-12-03 PROCEDURE — 3077F SYST BP >= 140 MM HG: CPT | Mod: CPTII,S$GLB,, | Performed by: ORTHOPAEDIC SURGERY

## 2024-12-03 PROCEDURE — 99214 OFFICE O/P EST MOD 30 MIN: CPT | Mod: S$GLB,,, | Performed by: ORTHOPAEDIC SURGERY

## 2024-12-03 PROCEDURE — 3080F DIAST BP >= 90 MM HG: CPT | Mod: CPTII,S$GLB,, | Performed by: ORTHOPAEDIC SURGERY

## 2024-12-03 PROCEDURE — 3008F BODY MASS INDEX DOCD: CPT | Mod: CPTII,S$GLB,, | Performed by: ORTHOPAEDIC SURGERY

## 2024-12-03 PROCEDURE — 3044F HG A1C LEVEL LT 7.0%: CPT | Mod: CPTII,S$GLB,, | Performed by: ORTHOPAEDIC SURGERY

## 2024-12-03 PROCEDURE — 1159F MED LIST DOCD IN RCRD: CPT | Mod: CPTII,S$GLB,, | Performed by: ORTHOPAEDIC SURGERY

## 2024-12-03 PROCEDURE — 73564 X-RAY EXAM KNEE 4 OR MORE: CPT | Mod: 26,,, | Performed by: RADIOLOGY

## 2024-12-03 PROCEDURE — 99999 PR PBB SHADOW E&M-EST. PATIENT-LVL IV: CPT | Mod: PBBFAC,,, | Performed by: ORTHOPAEDIC SURGERY

## 2024-12-03 NOTE — PROGRESS NOTES
CC: Right knee pain    54 y.o. Male with a 3 month history of right knee atraumatic pain. Patient works as a  and manages a landscape company. He has previously seen us for his Left knee, and in the interim he underwent a left TKA by Dr. Ortiz in April 2024. He reports continued lateral knee pain in deep flexion on the left side. Regarding his Right knee, he reports 3 months of pain and catching without CORAL.      He states that the pain is severe and not responding to any conservative care.      He reports that the pain and weakness. It also bothers him at night.    + mechanical symptoms, + instability    Is affecting ADLs.  Pain is 10/10 at it's worst.    REVIEW OF SYSTEMS:   Constitution: Negative. Negative for chills, fever and night sweats.   HENT: Negative for congestion and headaches.    Eyes: Negative for blurred vision, left vision loss and right vision loss.   Cardiovascular: Negative for chest pain and syncope.   Respiratory: Negative for cough and shortness of breath.    Endocrine: Negative for polydipsia, polyphagia and polyuria.   Hematologic/Lymphatic: Negative for bleeding problem. Does not bruise/bleed easily.   Skin: Negative for dry skin, itching and rash.   Musculoskeletal: Negative for falls. Positive for right knee pain and  muscle weakness.   Gastrointestinal: Negative for abdominal pain and bowel incontinence.   Genitourinary: Negative for bladder incontinence and nocturia.   Neurological: Negative for disturbances in coordination, loss of balance and seizures.   Psychiatric/Behavioral: Negative for depression. The patient does not have insomnia.    Allergic/Immunologic: Negative for hives and persistent infections.     PAST MEDICAL HISTORY:   Past Medical History:   Diagnosis Date    Allergy     Appendicitis 02/13/2023    Arthritis     Back pain     Chest pain 11/14/2014    Fatigue 09/19/2018    GERD (gastroesophageal reflux disease)     Hesitancy 05/22/2018     Hyperlipidemia     Low testosterone in male 05/22/2018    Nausea and vomiting 02/13/2023    Palpitations 11/14/2014    Post-void dribbling 05/22/2018    S/P ACL reconstruction 08/24/2023    S/P appendectomy 02/28/2023    S/P hernia repair 02/28/2023    S/P L knee surgery, arthroscopic medial/lateral menisectomy, loose body removal (12/31/14) 01/14/2015    Slow urinary stream 05/22/2018    Umbilical hernia without obstruction and without gangrene 02/13/2023       PAST SURGICAL HISTORY:   Past Surgical History:   Procedure Laterality Date    ANKLE SURGERY Left     ARTHROSCOPIC REPAIR OF ROTATOR CUFF OF SHOULDER Left 12/16/2022    Procedure: REPAIR, ROTATOR CUFF, ARTHROSCOPIC;  Surgeon: Montana Russ Jr., MD;  Location: [facility] OR;  Service: Orthopedics;  Laterality: Left;  need opus system Rastafari notified cc    ARTHROSCOPY OF SHOULDER WITH DECOMPRESSION OF SUBACROMIAL SPACE Left 12/16/2022    Procedure: ARTHROSCOPY, SHOULDER, WITH SUBACROMIAL SPACE DECOMPRESSION;  Surgeon: Montana Russ Jr., MD;  Location: [facility] OR;  Service: Orthopedics;  Laterality: Left;    BACK SURGERY  01/30/2017    INJECTION, TENDON SHEATH OR LIGAMENT, 1 TENDON SHEATH OR LIGAMENT Right 12/16/2022    Procedure: INJECTION,TENDON SHEATH OR LIGAMENT,1 TENDON SHEATH OR LIGAMENT;  Surgeon: Montana Russ Jr., MD;  Location: [facility] OR;  Service: Orthopedics;  Laterality: Right;    INSERTION OF INFLATABLE PENILE PROSTHESIS N/A 7/8/2024    Procedure: INSERTION, PENILE PROSTHESIS, INFLATABLE;  Surgeon: Jamey Rodrigues MD;  Location: [facility] OR;  Service: Urology;  Laterality: N/A;    KNEE SURGERY      x 5    LAPAROSCOPIC APPENDECTOMY N/A 2/13/2023    Procedure: APPENDECTOMY, LAPAROSCOPIC;  Surgeon: Babs Cano MD;  Location: [facility] OR;  Service: General;  Laterality: N/A;    REPAIR, HERNIA, UMBILICAL  2/13/2023    Procedure: REPAIR, HERNIA, UMBILICAL;  Surgeon: Babs Cano MD;  Location: [facility] OR;  Service: General;;    TOTAL KNEE  REPLACEMENT USING COMPUTER NAVIGATION Left 4/11/2024    Procedure: ARTHROPLASTY, KNEE, TOTAL, MADY COMPUTER-ASSISTED NAVIGATION: LEFT: SAME DAY;  Surgeon: Romeo Ortiz MD;  Location: [facility];  Service: Orthopedics;  Laterality: Left;    VASECTOMY         FAMILY HISTORY:   Family History   Problem Relation Name Age of Onset    Hypertension Father      Prostate cancer Father      Kidney cancer Neg Hx         SOCIAL HISTORY:   Social History     Socioeconomic History    Marital status:    Occupational History    Occupation:      Employer: [employer]   Tobacco Use    Smoking status: Some Days     Types: Cigars     Passive exposure: Current    Smokeless tobacco: Never    Tobacco comments:     Cigar every once a while   Substance and Sexual Activity    Alcohol use: Yes     Alcohol/week: 2.0 standard drinks of alcohol     Types: 2 Cans of beer per week     Comment: 2/wk    Drug use: Never    Sexual activity: Yes     Partners: Female     Social Drivers of Health     Financial Resource Strain: Low Risk  (2/5/2024)    Overall Financial Resource Strain (CARDIA)     Difficulty of Paying Living Expenses: Not very hard   Food Insecurity: No Food Insecurity (2/5/2024)    Hunger Vital Sign     Worried About Running Out of Food in the Last Year: Never true     Ran Out of Food in the Last Year: Never true   Transportation Needs: No Transportation Needs (2/5/2024)    PRAPARE - Transportation     Lack of Transportation (Medical): No     Lack of Transportation (Non-Medical): No   Physical Activity: Unknown (2/5/2024)    Exercise Vital Sign     Days of Exercise per Week: 1 day     Minutes of Exercise per Session: Patient declined   Stress: No Stress Concern Present (2/5/2024)    [employer] [place] [employer] - Occupational Stress Questionnaire     Feeling of Stress : Only a little   Housing Stability: Low Risk  (2/5/2024)    Housing Stability Vital Sign     Unable to Pay for Housing in the Last  Year: No     Number of Places Lived in the Last Year: 1     Unstable Housing in the Last Year: No       MEDICATIONS:     Current Outpatient Medications:     acetaminophen (TYLENOL) 650 MG TbSR, Take 1 tablet (650 mg total) by mouth every 8 (eight) hours., Disp: 120 tablet, Rfl: 0    cetirizine (ZYRTEC) 10 MG tablet, Take 1 tablet (10 mg total) by mouth once daily., Disp: 30 tablet, Rfl: 0    EPINEPHrine (EPIPEN) 0.3 mg/0.3 mL AtIn, Inject 0.3 mLs (0.3 mg total) into the muscle as needed., Disp: 1 each, Rfl: 0    famotidine (PEPCID) 40 MG tablet, Take 1 tablet (40 mg total) by mouth once daily. for 5 days, Disp: 5 tablet, Rfl: 0    tadalafiL (CIALIS) 5 MG tablet, Take 1 tablet (5 mg total) by mouth once daily. For BPH, Disp: 90 tablet, Rfl: 3    tamsulosin (FLOMAX) 0.4 mg Cap, Take 1 capsule (0.4 mg total) by mouth once daily. (Patient taking differently: Take 0.4 mg by mouth once daily. Pt still taking), Disp: 30 capsule, Rfl: 11    erythromycin (ROMYCIN) ophthalmic ointment, Place a 1/2 inch ribbon of ointment into the lower eyelid. (Patient not taking: Reported on 12/3/2024), Disp: 3.5 g, Rfl: 0    HYDROcodone-acetaminophen (NORCO) 7.5-325 mg per tablet, Take 1 tablet by mouth every 6 (six) hours as needed for Pain. (Patient not taking: Reported on 12/3/2024), Disp: 6 tablet, Rfl: 0    ketorolac 0.5% (ACULAR) 0.5 % Drop, Place 1 drop into both eyes 3 (three) times daily. for 10 days (Patient not taking: Reported on 12/3/2024), Disp: 5 mL, Rfl: 0  No current facility-administered medications for this visit.    Facility-Administered Medications Ordered in Other Visits:     triamcinolone acetonide injection 40 mg, 40 mg, Intra-articular, , Sergey Kruse MD, 40 mg at 11/11/14 1327    ALLERGIES:   Review of patient's allergies indicates:   Allergen Reactions    Bactrim [sulfamethoxazole-trimethoprim] Blisters     SJS, Swelling    Eliquis [apixaban] Anaphylaxis and Hives     Hives and throat swelling    Sulfa  (sulfonamide antibiotics) Swelling    Neosporin (neomycin-polymyx) Rash     Topical irritation     Aspirin Swelling    Latex, natural rubber Hives and Itching    Shellfish containing products Swelling       VITAL SIGNS:   BP (!) 142/92   Pulse 90   Ht 6' (1.829 m)   Wt 126.6 kg (279 lb 1.6 oz)   BMI 37.85 kg/m²      PHYSICAL EXAMINATION:  General:  The patient is alert and oriented x 3.  Mood is pleasant.  Observation of ears, eyes and nose reveal no gross abnormalities.  No labored breathing observed.    RIGHT KNEE EXAMINATION     OBSERVATION / INSPECTION   Gait:   Nonantalgic   Alignment:  Neutral   Scars:   None   Muscle atrophy: Mild  Effusion:  None   Warmth:  None   Discoloration:   none     TENDERNESS / CREPITUS (T / C):          T / C      T / C   Patella   - / -   Lateral joint line   - / -   Peripatellar medial  -  Medial joint line    - / -   Peripatellar lateral -  Medial plica   - / -   Patellar tendon -   Popliteal fossa   - / -   Quad tendon   -   Gastrocnemius   -   Prepatellar Bursa - / -   Quadricep   -   Tibial tubercle  -  Thigh/hamstring  -   Pes anserine/HS -  Fibula    -   ITB   - / -  Tibia     -   Tib/fib joint  - / -  LCL    -     MFC   - / -   MCL: Proximal  -    LFC   - / -    Distal   -          ROM: (* = pain)  PASSIVE   ACTIVE    Left :   5 / 0 / 145   5 / 0 / 145     Right :    5 / 0 / 145   5 / 0 / 145    Patellofemoral examination:  See above noted areas of tenderness.   Patella position    Subluxation / dislocation: Centered           Sup. / Inf;   Normal   Crepitus (PF):    Absent   Patellar Mobility:       Medial-lateral:   Normal    Superior-inferior:  Normal    Inferior tilt   Normal    Patellar tendon:  Normal   Lateral tilt:    Normal   J-sign:     None   Patellofemoral grind:   No pain       MENISCAL SIGNS:     Pain on terminal extension:  -  Pain on terminal flexion:  +  Jamess maneuver:  -   Squat     +     LIGAMENT EXAMINATION:  ACL / Lachman:  normal (-1 to  2mm)    PCL-Post.  drawer: normal 0 to 2mm  MCL- Valgus:  normal 0 to 2mm  LCL- Varus:  normal 0 to 2mm  Pivot shift: normal (Equal)   Dial Test: difference c/w other side   At 30° flexion: normal (< 5°)    At 90° flexion: normal (< 5°)   Reverse Pivot Shift:   normal (Equal)     STRENGTH: (* = with pain) PAINFUL SIDE   Quadricep   5/5   Hamstrin/5    EXTREMITY NEURO-VASCULAR EXAMINATION:   Sensation:  Grossly intact to light touch all dermatomal regions.   Motor Function:  Fully intact motor function at hip, knee, foot and ankle    DTRs;  quadriceps and  achilles 2+.  No clonus and downgoing Babinski.    Vascular status:  DP and PT pulses 2+, brisk capillary refill, symmetric.     IMAGING:    X-rays including standing, weight bearing AP and flexion bilateral knees, lateral and merchant views ordered and images reviewed by me show:    No fracture, dislocation or other pathology   Medial compartment: no degenerative changes   Lateral compartment: no degenerative changes   Patellofemoral compartment: no degenerative changes        ASSESSMENT:    Right Knee Pain, Probable   1. Pain in both knees, unspecified chronicity        PLAN:   1. MRI Right knee  2. F/U in clinic for results       All questions were answered, pt will contact us for questions or concerns in the interim.

## 2024-12-17 ENCOUNTER — HOSPITAL ENCOUNTER (OUTPATIENT)
Dept: RADIOLOGY | Facility: HOSPITAL | Age: 54
Discharge: HOME | End: 2024-12-17
Attending: ORTHOPAEDIC SURGERY
Payer: COMMERCIAL

## 2024-12-17 DIAGNOSIS — M25.561 CHRONIC PAIN OF RIGHT KNEE: ICD-10-CM

## 2024-12-17 DIAGNOSIS — G89.29 CHRONIC PAIN OF RIGHT KNEE: ICD-10-CM

## 2024-12-17 PROCEDURE — 73721 MRI JNT OF LWR EXTRE W/O DYE: CPT | Mod: 26,RT,, | Performed by: INTERNAL MEDICINE

## 2024-12-17 PROCEDURE — 73721 MRI JNT OF LWR EXTRE W/O DYE: CPT | Mod: TC,RT

## 2024-12-24 ENCOUNTER — OFFICE VISIT (OUTPATIENT)
Dept: SPORTS MEDICINE | Facility: CLINIC | Age: 54
End: 2024-12-24
Payer: COMMERCIAL

## 2024-12-24 VITALS
HEIGHT: 72 IN | HEART RATE: 80 BPM | BODY MASS INDEX: 37.81 KG/M2 | DIASTOLIC BLOOD PRESSURE: 82 MMHG | WEIGHT: 279.13 LBS | SYSTOLIC BLOOD PRESSURE: 129 MMHG

## 2024-12-24 DIAGNOSIS — S83.271A COMPLEX TEAR OF LATERAL MENISCUS OF RIGHT KNEE AS CURRENT INJURY: ICD-10-CM

## 2024-12-24 DIAGNOSIS — M94.261 CHONDROMALACIA, RIGHT KNEE: ICD-10-CM

## 2024-12-24 DIAGNOSIS — M17.11 PRIMARY OSTEOARTHRITIS OF RIGHT KNEE: Primary | ICD-10-CM

## 2024-12-24 PROCEDURE — 3008F BODY MASS INDEX DOCD: CPT | Mod: CPTII,S$GLB,, | Performed by: ORTHOPAEDIC SURGERY

## 2024-12-24 PROCEDURE — 99214 OFFICE O/P EST MOD 30 MIN: CPT | Mod: S$GLB,,, | Performed by: ORTHOPAEDIC SURGERY

## 2024-12-24 PROCEDURE — 99999 PR PBB SHADOW E&M-EST. PATIENT-LVL III: CPT | Mod: PBBFAC,,, | Performed by: ORTHOPAEDIC SURGERY

## 2024-12-24 PROCEDURE — 1159F MED LIST DOCD IN RCRD: CPT | Mod: CPTII,S$GLB,, | Performed by: ORTHOPAEDIC SURGERY

## 2024-12-24 PROCEDURE — 3074F SYST BP LT 130 MM HG: CPT | Mod: CPTII,S$GLB,, | Performed by: ORTHOPAEDIC SURGERY

## 2024-12-24 PROCEDURE — 3044F HG A1C LEVEL LT 7.0%: CPT | Mod: CPTII,S$GLB,, | Performed by: ORTHOPAEDIC SURGERY

## 2024-12-24 PROCEDURE — 3079F DIAST BP 80-89 MM HG: CPT | Mod: CPTII,S$GLB,, | Performed by: ORTHOPAEDIC SURGERY

## 2024-12-24 NOTE — PROGRESS NOTES
CC: Right knee pain    54 y.o. male presents for MRI review of right knee.  Concern for meniscus tear. Patient does not report any new incidents or injuries since their last appointment. Pain and symptoms remain unchanged since his last appointment. Here today to discuss treatment options.       Initial Hx:   54 y.o. Male with a 3 month history of right knee atraumatic pain. Patient works as a  and manages a landscape company. He has previously seen us for his Left knee, and in the interim he underwent a left TKA by Dr. Ortiz in April 2024. He reports continued lateral knee pain in deep flexion on the left side. Regarding his Right knee, he reports 3 months of pain and catching without CORAL.      He states that the pain is severe and not responding to any conservative care.      He reports that the pain and weakness. It also bothers him at night.    + mechanical symptoms, + instability    Is affecting ADLs.  Pain is 10/10 at it's worst.    REVIEW OF SYSTEMS:   Constitution: Negative. Negative for chills, fever and night sweats.   HENT: Negative for congestion and headaches.    Eyes: Negative for blurred vision, left vision loss and right vision loss.   Cardiovascular: Negative for chest pain and syncope.   Respiratory: Negative for cough and shortness of breath.    Endocrine: Negative for polydipsia, polyphagia and polyuria.   Hematologic/Lymphatic: Negative for bleeding problem. Does not bruise/bleed easily.   Skin: Negative for dry skin, itching and rash.   Musculoskeletal: Negative for falls. Positive for right knee pain and  muscle weakness.   Gastrointestinal: Negative for abdominal pain and bowel incontinence.   Genitourinary: Negative for bladder incontinence and nocturia.   Neurological: Negative for disturbances in coordination, loss of balance and seizures.   Psychiatric/Behavioral: Negative for depression. The patient does not have insomnia.    Allergic/Immunologic: Negative for hives  and persistent infections.     PAST MEDICAL HISTORY:   Past Medical History:   Diagnosis Date    Allergy     Appendicitis 02/13/2023    Arthritis     Back pain     Chest pain 11/14/2014    Fatigue 09/19/2018    GERD (gastroesophageal reflux disease)     Hesitancy 05/22/2018    Hyperlipidemia     Low testosterone in male 05/22/2018    Nausea and vomiting 02/13/2023    Palpitations 11/14/2014    Post-void dribbling 05/22/2018    S/P ACL reconstruction 08/24/2023    S/P appendectomy 02/28/2023    S/P hernia repair 02/28/2023    S/P L knee surgery, arthroscopic medial/lateral menisectomy, loose body removal (12/31/14) 01/14/2015    Slow urinary stream 05/22/2018    Umbilical hernia without obstruction and without gangrene 02/13/2023       PAST SURGICAL HISTORY:   Past Surgical History:   Procedure Laterality Date    ANKLE SURGERY Left     ARTHROSCOPIC REPAIR OF ROTATOR CUFF OF SHOULDER Left 12/16/2022    Procedure: REPAIR, ROTATOR CUFF, ARTHROSCOPIC;  Surgeon: Montana Russ Jr., MD;  Location: [facility] OR;  Service: Orthopedics;  Laterality: Left;  need [facility] notified cc    ARTHROSCOPY OF SHOULDER WITH DECOMPRESSION OF SUBACROMIAL SPACE Left 12/16/2022    Procedure: ARTHROSCOPY, SHOULDER, WITH SUBACROMIAL SPACE DECOMPRESSION;  Surgeon: Montana Russ Jr., MD;  Location: [facility] OR;  Service: Orthopedics;  Laterality: Left;    BACK SURGERY  01/30/2017    INJECTION, TENDON SHEATH OR LIGAMENT, 1 TENDON SHEATH OR LIGAMENT Right 12/16/2022    Procedure: INJECTION,TENDON SHEATH OR LIGAMENT,1 TENDON SHEATH OR LIGAMENT;  Surgeon: Montana Russ Jr., MD;  Location: [facility] OR;  Service: Orthopedics;  Laterality: Right;    INSERTION OF INFLATABLE PENILE PROSTHESIS N/A 7/8/2024    Procedure: INSERTION, PENILE PROSTHESIS, INFLATABLE;  Surgeon: Jamey Rodrigues MD;  Location: [facility] OR;  Service: Urology;  Laterality: N/A;    KNEE SURGERY      x 5    LAPAROSCOPIC APPENDECTOMY N/A 2/13/2023    Procedure: APPENDECTOMY,  LAPAROSCOPIC;  Surgeon: Babs Cano MD;  Location: [facility] OR;  Service: General;  Laterality: N/A;    REPAIR, HERNIA, UMBILICAL  2/13/2023    Procedure: REPAIR, HERNIA, UMBILICAL;  Surgeon: Babs Cano MD;  Location: [facility] OR;  Service: General;;    TOTAL KNEE REPLACEMENT USING COMPUTER NAVIGATION Left 4/11/2024    Procedure: ARTHROPLASTY, KNEE, TOTAL, MADY COMPUTER-ASSISTED NAVIGATION: LEFT: SAME DAY;  Surgeon: Romeo Ortiz MD;  Location: [facility] OR;  Service: Orthopedics;  Laterality: Left;    VASECTOMY         FAMILY HISTORY:   Family History   Problem Relation Name Age of Onset    Hypertension Father      Prostate cancer Father      Kidney cancer Neg Hx         SOCIAL HISTORY:   Social History     Socioeconomic History    Marital status:    Occupational History    Occupation:      Employer: [employer]   Tobacco Use    Smoking status: Some Days     Types: Cigars     Passive exposure: Current    Smokeless tobacco: Never    Tobacco comments:     Cigar every once a while   Substance and Sexual Activity    Alcohol use: Yes     Alcohol/week: 2.0 standard drinks of alcohol     Types: 2 Cans of beer per week     Comment: 2/wk    Drug use: Never    Sexual activity: Yes     Partners: Female     Social Drivers of Health     Financial Resource Strain: Low Risk  (2/5/2024)    Overall Financial Resource Strain (CARDIA)     Difficulty of Paying Living Expenses: Not very hard   Food Insecurity: No Food Insecurity (2/5/2024)    Hunger Vital Sign     Worried About Running Out of Food in the Last Year: Never true     Ran Out of Food in the Last Year: Never true   Transportation Needs: No Transportation Needs (2/5/2024)    PRAPARE - Transportation     Lack of Transportation (Medical): No     Lack of Transportation (Non-Medical): No   Physical Activity: Unknown (2/5/2024)    Exercise Vital Sign     Days of Exercise per Week: 1 day     Minutes of Exercise per Session: Patient declined    Stress: No Stress Concern Present (2/5/2024)    [employer] - Occupational Stress Questionnaire     Feeling of Stress : Only a little   Housing Stability: Low Risk  (2/5/2024)    Housing Stability Vital Sign     Unable to Pay for Housing in the Last Year: No     Number of Places Lived in the Last Year: 1     Unstable Housing in the Last Year: No       MEDICATIONS:     Current Outpatient Medications:     acetaminophen (TYLENOL) 650 MG TbSR, Take 1 tablet (650 mg total) by mouth every 8 (eight) hours., Disp: 120 tablet, Rfl: 0    cetirizine (ZYRTEC) 10 MG tablet, Take 1 tablet (10 mg total) by mouth once daily., Disp: 30 tablet, Rfl: 0    EPINEPHrine (EPIPEN) 0.3 mg/0.3 mL AtIn, Inject 0.3 mLs (0.3 mg total) into the muscle as needed., Disp: 1 each, Rfl: 0    tadalafiL (CIALIS) 5 MG tablet, Take 1 tablet (5 mg total) by mouth once daily. For BPH, Disp: 90 tablet, Rfl: 3    erythromycin (ROMYCIN) ophthalmic ointment, Place a 1/2 inch ribbon of ointment into the lower eyelid. (Patient not taking: Reported on 12/24/2024), Disp: 3.5 g, Rfl: 0    famotidine (PEPCID) 40 MG tablet, Take 1 tablet (40 mg total) by mouth once daily. for 5 days, Disp: 5 tablet, Rfl: 0    HYDROcodone-acetaminophen (NORCO) 7.5-325 mg per tablet, Take 1 tablet by mouth every 6 (six) hours as needed for Pain. (Patient not taking: Reported on 12/24/2024), Disp: 6 tablet, Rfl: 0    tamsulosin (FLOMAX) 0.4 mg Cap, Take 1 capsule (0.4 mg total) by mouth once daily. (Patient taking differently: Take 0.4 mg by mouth once daily. Pt still taking), Disp: 30 capsule, Rfl: 11  No current facility-administered medications for this visit.    Facility-Administered Medications Ordered in Other Visits:     triamcinolone acetonide injection 40 mg, 40 mg, Intra-articular, , Sergey Kruse MD, 40 mg at 11/11/14 1327    ALLERGIES:   Review of patient's allergies indicates:   Allergen Reactions    Bactrim  [sulfamethoxazole-trimethoprim] Blisters     SJS, Swelling    Eliquis [apixaban] Anaphylaxis and Hives     Hives and throat swelling    Sulfa (sulfonamide antibiotics) Swelling    Neosporin (neomycin-polymyx) Rash     Topical irritation     Aspirin Swelling    Latex, natural rubber Hives and Itching    Shellfish containing products Swelling       VITAL SIGNS:   /82   Pulse 80   Ht 6' (1.829 m)   Wt 126.6 kg (279 lb 1.6 oz)   BMI 37.85 kg/m²      PHYSICAL EXAMINATION:  General:  The patient is alert and oriented x 3.  Mood is pleasant.  Observation of ears, eyes and nose reveal no gross abnormalities.  No labored breathing observed.    RIGHT KNEE EXAMINATION     OBSERVATION / INSPECTION   Gait:   Nonantalgic   Alignment:  Neutral   Scars:   None   Muscle atrophy: Mild  Effusion:  None   Warmth:  None   Discoloration:   none     TENDERNESS / CREPITUS (T / C):          T / C      T / C   Patella   - / -   Lateral joint line   - / -   Peripatellar medial  -  Medial joint line    - / -   Peripatellar lateral -  Medial plica   - / -   Patellar tendon -   Popliteal fossa   - / -   Quad tendon   -   Gastrocnemius   -   Prepatellar Bursa - / -   Quadricep   -   Tibial tubercle  -  Thigh/hamstring  -   Pes anserine/HS -  Fibula    -   ITB   - / -  Tibia     -   Tib/fib joint  - / -  LCL    -     MFC   - / -   MCL: Proximal  -    LFC   - / -    Distal   -          ROM: (* = pain)  PASSIVE   ACTIVE    Left :   5 / 0 / 145   5 / 0 / 145     Right :    5 / 0 / 145   5 / 0 / 145    Patellofemoral examination:  See above noted areas of tenderness.   Patella position    Subluxation / dislocation: Centered           Sup. / Inf;   Normal   Crepitus (PF):    Absent   Patellar Mobility:       Medial-lateral:   Normal    Superior-inferior:  Normal    Inferior tilt   Normal    Patellar tendon:  Normal   Lateral tilt:    Normal   J-sign:     None   Patellofemoral grind:   No pain       MENISCAL SIGNS:     Pain on terminal  extension:  -  Pain on terminal flexion:  +  Jamess maneuver:  -   Squat     +     LIGAMENT EXAMINATION:  ACL / Lachman:  normal (-1 to 2mm)    PCL-Post.  drawer: normal 0 to 2mm  MCL- Valgus:  normal 0 to 2mm  LCL- Varus:  normal 0 to 2mm  Pivot shift: normal (Equal)   Dial Test: difference c/w other side   At 30° flexion: normal (< 5°)    At 90° flexion: normal (< 5°)   Reverse Pivot Shift:   normal (Equal)     STRENGTH: (* = with pain) PAINFUL SIDE   Quadricep   5/5   Hamstrin/5    EXTREMITY NEURO-VASCULAR EXAMINATION:   Sensation:  Grossly intact to light touch all dermatomal regions.   Motor Function:  Fully intact motor function at hip, knee, foot and ankle    DTRs;  quadriceps and  achilles 2+.  No clonus and downgoing Babinski.    Vascular status:  DP and PT pulses 2+, brisk capillary refill, symmetric.     IMAGING:    X-rays including standing, weight bearing AP and flexion bilateral knees, lateral and merchant views ordered and images reviewed by me show:    No fracture, dislocation or other pathology   Medial compartment: mild DJD    Lateral compartment: no degenerative changes   Patellofemoral compartment: no degenerative changes   Kellgren ann grade 2     MRI Knee Without Contrast Right  Narrative: EXAMINATION:  MRI KNEE WITHOUT CONTRAST RIGHT    CLINICAL HISTORY:  Meniscal tear, untreated, new symptoms;Pain in right knee    TECHNIQUE:  Multiplanar, multisequence images were performed about the right knee.    COMPARISON:  Knee radiograph 2024, 2024.  MR knee 2021.    FINDINGS:  Menisci: New complex tear of the lateral meniscus, predominantly in the posterior horn.  Medial meniscus is intact.Meniscal root attachments are normal.    Ligaments: Thickening of the ACL, similar to prior, compatible with mucoid degeneration.  PCL is intact.MCL and LCL complex are intact.    Tendons:  The quadriceps and patellar tendons are normal.    Cartilage:    Patellofemoral:  Moderate-sized full-thickness chondral defect over the central trochlea with subchondral edema.  New small high-grade chondral defect over the lateral patellar facet.    Medial tibiofemoral: Small low-grade chondral superficial fissuring over the femoral condyle.    Lateral tibiofemoral: Moderate-sized full-thickness chondral defects over the weight-bearing femoral condyle.  Small full-thickness chondral defect over the central tibial plateau.    Bone: No fracture or marrow replacing process.    Miscellaneous: Small joint effusion and synovitis. Low-signal intraarticular low bodies in the posterior joint space.  Ganglion formation at the level of the intercondylar notch.  Small Baker's cyst.  Impression: Complex tear of the posterior horn lateral meniscus, new from prior.    Tricompartmental cartilage loss, progressed from 2021.    Small joint effusion/synovitis and small ossified loose bodies.    Electronically signed by resident: Ag Laurent  Date:    12/18/2024  Time:    08:20    Electronically signed by: Diallo Mariscal  Date:    12/18/2024  Time:    16:04          ASSESSMENT:    Right Knee Pain, Probable   1. Chronic pain of right knee    2. Complex tear of lateral meniscus of right knee as current injury    3. Chondromalacia, right knee          PLAN:   1. Euflexxa right knee      2. F/u for injections       All questions were answered, pt will contact us for questions or concerns in the interim.

## 2025-01-14 ENCOUNTER — OFFICE VISIT (OUTPATIENT)
Dept: SPORTS MEDICINE | Facility: CLINIC | Age: 55
End: 2025-01-14
Payer: COMMERCIAL

## 2025-01-14 VITALS
HEIGHT: 72 IN | SYSTOLIC BLOOD PRESSURE: 141 MMHG | DIASTOLIC BLOOD PRESSURE: 93 MMHG | WEIGHT: 285.69 LBS | BODY MASS INDEX: 38.7 KG/M2 | HEART RATE: 76 BPM

## 2025-01-14 DIAGNOSIS — M17.11 PRIMARY OSTEOARTHRITIS OF RIGHT KNEE: Primary | ICD-10-CM

## 2025-01-14 PROCEDURE — 99999 PR PBB SHADOW E&M-EST. PATIENT-LVL III: CPT | Mod: PBBFAC,,, | Performed by: ORTHOPAEDIC SURGERY

## 2025-01-14 PROCEDURE — 99499 UNLISTED E&M SERVICE: CPT | Mod: S$GLB,,, | Performed by: ORTHOPAEDIC SURGERY

## 2025-01-14 PROCEDURE — 20610 DRAIN/INJ JOINT/BURSA W/O US: CPT | Mod: RT,S$GLB,, | Performed by: ORTHOPAEDIC SURGERY

## 2025-01-14 NOTE — PROCEDURES
Large Joint Aspiration/Injection: R knee    Date/Time: 1/14/2025 1:00 PM    Performed by: Sergey Kruse MD  Authorized by: Sergey Kruse MD    Consent Done?:  Yes (Verbal)  Indications:  Pain  Site marked: the procedure site was marked    Timeout: prior to procedure the correct patient, procedure, and site was verified    Prep: patient was prepped and draped in usual sterile fashion      Details:  Needle Size:  22 G  Ultrasonic Guidance for needle placement?: No    Approach:  Superior  Location:  Knee  Site:  R knee  Medications:  10 mg sodium hyaluronate (EUFLEXXA) 10 mg/mL(mw 2.4 -3.6 million)  Patient tolerance:  Patient tolerated the procedure well with no immediate complications

## 2025-01-14 NOTE — PROGRESS NOTES
Patient is here for follow up of right knee arthritis. Pt is requesting Euflexxa injection #1.    Patient has painful DJD of right knee and has failed other conservative modalities including NSAIDS, activity modification, weight loss and rehabilitation exercises.      Patient has received good relief with these injections in the past.      The prior shots were tolerated well.  RADIOGRAPHIC IMAGING:   Kellgren-Fam Grade 2-3.   PHYSICAL EXAMINATION:      General: The patient is alert and oriented x 3. Mood is pleasant.   Observation of ears, eyes and nose reveals no gross abnormalities. No   labored breathing observed.      No signs of infection or adverse reaction to Right knee.      Euflexxa Injection Procedure #1     A time out was performed, including verification of patient ID, procedure, site and side, availability of information and equipment, review of safety issues, and agreement with consent, the procedure site was marked.     The patient was prepped aseptically with povidone-iodine swabsticks. A diagnostic and therapeutic injection of 2cc Euflexxa   was given under sterile technique using a 21.5g x 1.5 needle from the superolateral aspect of the right knee joint in the supine position.       he had no adverse reactions to the medication. Pain decreased. he   was instructed to apply ice to the joint for 20 minutes and avoid strenuous activities for 24-36 hours following the injection. he   was warned of possible blood pressure changes during that time. Following that time, he can resume activities as prior to the injection.     he was reminded to call the clinic immediately for any adverse side effects as explained in clinic today.     Lot: [id number]  Exp: 11/21/25

## 2025-01-20 ENCOUNTER — TELEPHONE (OUTPATIENT)
Dept: SPORTS MEDICINE | Facility: CLINIC | Age: 55
End: 2025-01-20
Payer: COMMERCIAL

## 2025-01-20 NOTE — TELEPHONE ENCOUNTER
LVM for patient to reschedule his injection because of the incoming weather. Told that we will cancel the appointment tomorrow and to come for his 2nd injection next week.

## 2025-01-28 ENCOUNTER — OFFICE VISIT (OUTPATIENT)
Dept: SPORTS MEDICINE | Facility: CLINIC | Age: 55
End: 2025-01-28
Payer: COMMERCIAL

## 2025-01-28 DIAGNOSIS — M17.11 PRIMARY OSTEOARTHRITIS OF RIGHT KNEE: Primary | ICD-10-CM

## 2025-01-28 PROCEDURE — 99999 PR PBB SHADOW E&M-EST. PATIENT-LVL I: CPT | Mod: PBBFAC,,, | Performed by: ORTHOPAEDIC SURGERY

## 2025-01-28 PROCEDURE — 99499 UNLISTED E&M SERVICE: CPT | Mod: S$GLB,,, | Performed by: ORTHOPAEDIC SURGERY

## 2025-01-28 NOTE — PROGRESS NOTES
Patient is here for follow up of right knee arthritis. Pt is requesting Euflexxa injection #2.    Patient has painful DJD of right knee and has failed other conservative modalities including NSAIDS, activity modification, weight loss and rehabilitation exercises.      Patient has received good relief with these injections in the past.      The prior shots were tolerated well.  RADIOGRAPHIC IMAGING:   Kellgren-Fam Grade 2-3.   PHYSICAL EXAMINATION:      General: The patient is alert and oriented x 3. Mood is pleasant.   Observation of ears, eyes and nose reveals no gross abnormalities. No   labored breathing observed.      No signs of infection or adverse reaction to Right knee.      Euflexxa Injection Procedure #2     A time out was performed, including verification of patient ID, procedure, site and side, availability of information and equipment, review of safety issues, and agreement with consent, the procedure site was marked.     The patient was prepped aseptically with povidone-iodine swabsticks. A diagnostic and therapeutic injection of 2cc Euflexxa   was given under sterile technique using a 21.5g x 1.5 needle from the superolateral aspect of the right knee joint in the supine position.       he had no adverse reactions to the medication. Pain decreased. he   was instructed to apply ice to the joint for 20 minutes and avoid strenuous activities for 24-36 hours following the injection. he   was warned of possible blood pressure changes during that time. Following that time, he can resume activities as prior to the injection.     he was reminded to call the clinic immediately for any adverse side effects as explained in clinic today.     Lot: [id number]  Exp: 11/21/25

## 2025-02-04 ENCOUNTER — OFFICE VISIT (OUTPATIENT)
Dept: SPORTS MEDICINE | Facility: CLINIC | Age: 55
End: 2025-02-04
Payer: COMMERCIAL

## 2025-02-04 VITALS
BODY MASS INDEX: 38.87 KG/M2 | DIASTOLIC BLOOD PRESSURE: 95 MMHG | WEIGHT: 286.63 LBS | SYSTOLIC BLOOD PRESSURE: 155 MMHG | HEART RATE: 92 BPM

## 2025-02-04 DIAGNOSIS — M17.11 PRIMARY OSTEOARTHRITIS OF RIGHT KNEE: Primary | ICD-10-CM

## 2025-02-04 DIAGNOSIS — M25.562 LEFT KNEE PAIN, UNSPECIFIED CHRONICITY: ICD-10-CM

## 2025-02-04 PROCEDURE — 20610 DRAIN/INJ JOINT/BURSA W/O US: CPT | Mod: RT,S$GLB,, | Performed by: ORTHOPAEDIC SURGERY

## 2025-02-04 PROCEDURE — 99499 UNLISTED E&M SERVICE: CPT | Mod: S$GLB,,, | Performed by: ORTHOPAEDIC SURGERY

## 2025-02-04 PROCEDURE — 99999 PR PBB SHADOW E&M-EST. PATIENT-LVL III: CPT | Mod: PBBFAC,,, | Performed by: ORTHOPAEDIC SURGERY

## 2025-02-04 NOTE — PROCEDURES
Large Joint Aspiration/Injection: R knee    Date/Time: 2/4/2025 1:15 PM    Performed by: Sergey Kruse MD  Authorized by: Sergey Kruse MD    Consent Done?:  Yes (Verbal)  Indications:  Pain  Site marked: the procedure site was marked    Timeout: prior to procedure the correct patient, procedure, and site was verified    Prep: patient was prepped and draped in usual sterile fashion      Details:  Needle Size:  22 G  Ultrasonic Guidance for needle placement?: No    Approach:  Superior  Location:  Knee  Site:  R knee  Medications:  10 mg sodium hyaluronate (EUFLEXXA) 10 mg/mL(mw 2.4 -3.6 million)  Patient tolerance:  Patient tolerated the procedure well with no immediate complications

## 2025-02-04 NOTE — PROGRESS NOTES
Patient is here for follow up of right knee arthritis. Pt is requesting Euflexxa injection #3.    Patient has painful DJD of right knee and has failed other conservative modalities including NSAIDS, activity modification, weight loss and rehabilitation exercises.      Patient has received good relief with these injections in the past.      The prior shots were tolerated well.  RADIOGRAPHIC IMAGING:   Kellgren-Fam Grade 2-3.   PHYSICAL EXAMINATION:      General: The patient is alert and oriented x 3. Mood is pleasant.   Observation of ears, eyes and nose reveals no gross abnormalities. No   labored breathing observed.      No signs of infection or adverse reaction to Right knee.      Euflexxa Injection Procedure #3     A time out was performed, including verification of patient ID, procedure, site and side, availability of information and equipment, review of safety issues, and agreement with consent, the procedure site was marked.     The patient was prepped aseptically with povidone-iodine swabsticks. A diagnostic and therapeutic injection of 2cc Euflexxa   was given under sterile technique using a 21.5g x 1.5 needle from the superolateral aspect of the right knee joint in the supine position.       he had no adverse reactions to the medication. Pain decreased. he   was instructed to apply ice to the joint for 20 minutes and avoid strenuous activities for 24-36 hours following the injection. he   was warned of possible blood pressure changes during that time. Following that time, he can resume activities as prior to the injection.     he was reminded to call the clinic immediately for any adverse side effects as explained in clinic today.     Lot: [id number]  Exp: 11/21/25

## 2025-02-11 ENCOUNTER — CLINICAL SUPPORT (OUTPATIENT)
Dept: REHABILITATION | Facility: HOSPITAL | Age: 55
End: 2025-02-11
Payer: COMMERCIAL

## 2025-02-11 DIAGNOSIS — M25.662 DECREASED RANGE OF MOTION OF LEFT KNEE: Primary | ICD-10-CM

## 2025-02-11 DIAGNOSIS — R53.1 DECREASED STRENGTH: ICD-10-CM

## 2025-02-11 DIAGNOSIS — M17.11 PRIMARY OSTEOARTHRITIS OF RIGHT KNEE: ICD-10-CM

## 2025-02-11 DIAGNOSIS — M25.562 LEFT KNEE PAIN, UNSPECIFIED CHRONICITY: ICD-10-CM

## 2025-02-11 PROCEDURE — 97162 PT EVAL MOD COMPLEX 30 MIN: CPT | Mod: PN

## 2025-02-12 NOTE — PROGRESS NOTES
Outpatient Rehab    Physical Therapy Evaluation    Patient Name: Esa Yang  MRN: [id number]  YOB: 1970  Today's Date: 2/11/2025    Therapy Diagnosis:   Encounter Diagnoses   Name Primary?    Primary osteoarthritis of right knee     Left knee pain, unspecified chronicity     Decreased range of motion of left knee Yes    Decreased strength      Physician: Sergey Kruse MD    Physician Orders: Eval and Treat  Medical Diagnosis:   M17.11 (ICD-10-CM) - Primary osteoarthritis of right knee   M25.562 (ICD-10-CM) - Left knee pain, unspecified chronicity       Visit # / Visits Authorized:  1 / 10   Date of Evaluation:  2/11/2025   Insurance Authorization Period: 2/11/2025 to 12/31/2025  Plan of Care Certification:  2/11/2025 to 3/24/2025      Time In: 0830   Time Out: 0900  Total Time: 30   Total Billable Time: 30    Intake Outcome Measure for FOTO Survey    Therapist reviewed FOTO scores for Esa Yang on 2/11/2025.   FOTO report - see Media section or FOTO account episode details.     Intake Score: 52%         Subjective   History of Present Illness  Esa is a 55 y.o. male who reports to physical therapy with a chief concern of Bilateral knee pain. According to the patient's chart, Esa has a past medical history of Allergy, Appendicitis, Arthritis, Back pain, Chest pain, Fatigue, GERD (gastroesophageal reflux disease), Hesitancy, Hyperlipidemia, Low testosterone in male, Nausea and vomiting, Palpitations, Post-void dribbling, S/P ACL reconstruction, S/P appendectomy, S/P hernia repair, S/P L knee surgery, arthroscopic medial/lateral menisectomy, loose body removal (12/31/14), Slow urinary stream, and Umbilical hernia without obstruction and without gangrene. Esa has a past surgical history that includes Knee surgery; Ankle surgery (Left); Back surgery (01/30/2017); Vasectomy; Arthroscopic repair of rotator cuff of shoulder (Left, 12/16/2022); Arthroscopy of shoulder with  decompression of subacromial space (Left, 12/16/2022); injection, tendon sheath or ligament, 1 tendon sheath or ligament (Right, 12/16/2022); Laparoscopic appendectomy (N/A, 2/13/2023); repair, hernia, umbilical (2/13/2023); Total knee replacement using computer navigation (Left, 4/11/2024); and Insertion of inflatable penile prosthesis (N/A, 7/8/2024).    The patient reports a medical diagnosis of M17.11 (ICD-10-CM) - Primary osteoarthritis of right knee  M25.562 (ICD-10-CM) - Left knee pain, unspecified chronicity.    Diagnostic tests related to this condition: MRI studies.   MRI Studies Details: Complex tear of the posterior horn lateral meniscus, new from prior.     Tricompartmental cartilage loss, progressed from 2021.     Small joint effusion/synovitis and small ossified loose bodies.    History of Present Condition/Illness: History of left TKA by Dr. Ortiz in April 2024. Still has pain at lateral aspect of his left knee. History of tearing hamstring off the bone in his late 20's and got a hamstring repair from his knowledge. Has increased pain with exercise, knee flexion, stairs, and kneeling. Biggest complaint is pain when he actively bends his knee. Also, has right knee pain and has been receiving Euflexxa injections to right knee, 3 rounds in 3 weeks. No pain in right knee at this time since injection. Prior to injection, swelling would wax/wane, increased pain stairs, walking, and standing. Endorses clicking to right knee prior to injection.    Pain     Patient reports a current pain level of 3/10. Pain at best is reported as 3/10. Pain at worst is reported as 10/10.   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       Past Medical History/Physical Systems Review:   Esa Yang  has a past medical history of Allergy, Appendicitis, Arthritis, Back pain, Chest pain, Fatigue, GERD (gastroesophageal reflux disease), Hesitancy, Hyperlipidemia, Low testosterone in male, Nausea and vomiting, Palpitations, Post-void dribbling, S/P ACL reconstruction, S/P appendectomy, S/P hernia repair, S/P L knee surgery, arthroscopic medial/lateral menisectomy, loose body removal (12/31/14), Slow urinary stream, and Umbilical hernia without obstruction and without gangrene.    Esa Yang  has a past surgical history that includes Knee surgery; Ankle surgery (Left); Back surgery (01/30/2017); Vasectomy; Arthroscopic repair of rotator cuff of shoulder (Left, 12/16/2022); Arthroscopy of shoulder with decompression of subacromial space (Left, 12/16/2022); injection, tendon sheath or ligament, 1 tendon sheath or ligament (Right, 12/16/2022); Laparoscopic appendectomy (N/A, 2/13/2023); repair, hernia, umbilical (2/13/2023); Total knee replacement using computer navigation (Left, 4/11/2024); and Insertion of inflatable penile prosthesis (N/A, 7/8/2024).    Esa has a current medication list which includes the following prescription(s): acetaminophen, cetirizine, epinephrine, famotidine, hydrocodone-acetaminophen, tadalafil, and tamsulosin, and the following Facility-Administered Medications: triamcinolone acetonide.    Review of patient's allergies indicates:   Allergen Reactions    Bactrim [sulfamethoxazole-trimethoprim] Blisters     SJS, Swelling    Eliquis [apixaban] Anaphylaxis and Hives     Hives and throat swelling    Sulfa (sulfonamide antibiotics) Swelling    Neosporin (neomycin-polymyx) Rash     Topical irritation     Aspirin Swelling    Latex, natural rubber Hives and Itching    Shellfish containing products Swelling        Objective   Posture                 Bilateral ankles/feet exhibit: Toe In       Knee  Observations  Left Knee Observations  Present: Adhesive Scar and Incision  Left Knee Incision Observations  Present: Clean and Dry  Not Present: Drainage     Left knee TKA incision; Left incision at lateral knee from prior hamstring repair.       Knee Palpation     TTP lateral joint line       No tenderness to palpation of hamstring tendons or muscle bellies, ITB, gastroc heads, or posterior knee capsule. Lateral femoral condyle sensitivity. Numbness surrounding incisions.          Knee Range of Motion   Right Knee   Active (deg) Passive (deg) Pain   Flexion 123       Extension 2           Left Knee   Active (deg) Passive (deg) Pain   Flexion 115       Extension 3                          Hip Strength - Planes of Motion   Right Strength Right Pain Left Strength Left  Pain   Flexion (L2) 4+   4+     Extension           ABduction 4   4     ADduction           Internal Rotation           External Rotation               Knee Strength   Right Strength Right Pain Left Strength Left  Pain   Flexion (S2)           Prone Flexion 5   5     Extension (L3) 5   5                Knee Special Tests  Knee Ligament Tests  Negative: Right Anterior Drawer and Right Posterior Drawer  Anterior Drawer: (+) laxity with clicking sensation noted - Left; Posterior Drawer: (-) - Left    (-) end range flexion, (-) forced terminal knee extnesion, (+) lateral joint line pain, (+) report of catching/locking, (-) McMuaary- Right Knee               Knee Patellar Screening  Right Patellar Static Positioning: Ching  Left Patellar Static Positioning: Normal    Left Patellar Mobility  Hypomobile: Medial, Lateral, Superior, and Inferior  Significant increase in patellar size on L>R           Treatment:   None- out of time secondary to patient's late arrival and paperwork.                             Assessment & Plan   Assessment  Esa presents with a condition of Moderate complexity.   Presentation of Symptoms: Stable  Will Comorbidities Impact Care:  Yes  Hx of left hamstring repair, Hx of left TKA,    Functional Limitations: Activity tolerance, Functional mobility, Getting off the floor, Painful locomotion/ambulation, Participating in sports, Participating in leisure activities, Range of motion, Squatting  Impairments: Activity intolerance, Abnormal or restricted range of motion, Impaired physical strength, Pain with functional activity  Personal Factors Affecting Prognosis: Other (Comment)  Other Personal Factors Affecting Prognosis: Work schedule    Patient Goal for Therapy (PT): Improve lateral knee pain and be able to kneel  Prognosis: Good  Assessment Details: Patient presents with complaints of bilateral knee pain. History of left hamstring tendon repair and recent TKA on 2024. Right knee MRI demonstrates a complex tear of the posterior horn lateral meniscus and tricompartmental cartilage loss. Bigger complaint of left lateral knee pain at this time. Range of motion deficits still noted from prior TKA. Slight instability noted with prior TKA especially anterior translation with clicking noted. No increased pain with resisted hamstring or quadriceps testing. However, objective tests and measures limited from 30 minute delay in start time from patient arriving late.    Plan  From a physical therapy perspective, the patient would benefit from: Skilled Rehab Services    Planned therapy interventions include: Therapeutic exercise, Manual therapy, Therapeutic activities, Neuromuscular re-education, and Gait training.    Planned modalities to include: Cryotherapy (cold pack) and Thermotherapy (hot pack).        Visit Frequency: 2 times Per Week for 6 Weeks.       This plan was discussed with Patient and Therapy assistant.   Discussion participants: Agreed Upon Plan of Care             Patient's spiritual, cultural, and educational needs considered and patient agreeable to plan of care and goals.     Education  Education was done with Patient. The patient's  learning style includes Demonstration and Listening. The patient Demonstrates understanding and Verbalizes understanding.                 Goals:   Active       Long Term Goals       Patient will be able to perform tall kneeling pain-free to improve tolerance to kneeling.        Start:  02/11/25    Expected End:  03/25/25            Patient will be able to perform 10 repetitions of double leg squats to improve tolerance to closed chain activities.       Start:  02/11/25    Expected End:  03/25/25            Patient will deny pain at work to return to prior level of function.       Start:  02/11/25    Expected End:  03/25/25            Patient will be able to kneel onto his bed to improve functional activity tolerance.       Start:  02/11/25    Expected End:  03/25/25               Short Term Goals        Patient will be independent with home exercise program to improve functional compliance.        Start:  02/11/25    Expected End:  03/04/25            Patient will improve left knee range of motion to 0-0-120 degrees to improve functional mobility.       Start:  02/11/25            Patient will report decreased pain levels to <2/10 while ambulating to improve tolerance to long duration walking.       Start:  02/11/25    Expected End:  03/04/25                Juana Ribeiro, PT

## 2025-02-13 ENCOUNTER — CLINICAL SUPPORT (OUTPATIENT)
Dept: REHABILITATION | Facility: HOSPITAL | Age: 55
End: 2025-02-13
Payer: COMMERCIAL

## 2025-02-13 DIAGNOSIS — M25.662 DECREASED RANGE OF MOTION OF LEFT KNEE: Primary | ICD-10-CM

## 2025-02-13 DIAGNOSIS — R53.1 DECREASED STRENGTH: ICD-10-CM

## 2025-02-13 PROCEDURE — 97112 NEUROMUSCULAR REEDUCATION: CPT | Mod: PN

## 2025-02-13 PROCEDURE — 97530 THERAPEUTIC ACTIVITIES: CPT | Mod: PN

## 2025-02-13 NOTE — PROGRESS NOTES
"  Outpatient Rehab    Physical Therapy Visit    Patient Name: Esa Yang  MRN: [id number]  YOB: 1970  Today's Date: 2/17/2025    Therapy Diagnosis: No diagnosis found.  Physician: Sergey Kruse MD    Physician Orders: Eval and Treat  Physician Orders: Eval and Treat  Medical Diagnosis:   M17.11 (ICD-10-CM) - Primary osteoarthritis of right knee   M25.562 (ICD-10-CM) - Left knee pain, unspecified chronicity   Visit # / Visits Authorized:  2 / 10   Date of Evaluation:  2/11/2025   Insurance Authorization Period: 2/11/2025 to 12/31/2025  Plan of Care Certification:  2/11/2025 to 3/24/2025      Time In: 0805   Time Out: 0900  Total Time: 55   Total Billable Time: 55    FOTO:  Intake Score:  %  Survey Score 1:  %  Survey Score 2:  %         Subjective   primary complaints are left knee pain. Pain is typically posterior and lateral..  Pain reported as 3/10.      Objective            Treatment:  Balance/Neuromuscular Re-Education  Balance/Neuromuscular Re-Education Activity 1: Quad set: 5"x20  Balance/Neuromuscular Re-Education Activity 2: SLR: 3x10  Balance/Neuromuscular Re-Education Activity 3: Side lying hip abduction: 3x10  Balance/Neuromuscular Re-Education Activity 4: Sinblge leg lateral step downs: 3x10 - green step  Balance/Neuromuscular Re-Education Activity 5: Standing hip abduction: 3x10 - GTB    Therapeutic Activity  Therapeutic Activity 1: Hook lying bent knee fall outs: 5"x15  Therapeutic Activity 2: Piriformis stretch: 3x30"  Therapeutic Activity 3: Gastroc fitter stretch: 3x30"  Therapeutic Activity 4: Soleus fitter oscillations: 20x    Assessment & Plan   Assessment: Patient presents with complaints of bilateral knee pain. History of left hamstring tendon repair and recent TKA on 2024. Right knee MRI demonstrates a complex tear of the posterior horn lateral meniscus and tricompartmental cartilage loss. Bigger complaint of left lateral knee pain at this time. Range of motion " deficits still noted from prior TKA. Slight instability noted with prior TKA especially anterior translation with clicking noted. First visit following initial evaluation focused on hip mobility and stability.  Evaluation/Treatment Tolerance: Patient tolerated treatment well    Patient will continue to benefit from skilled outpatient physical therapy to address the deficits listed in the problem list box on initial evaluation, provide pt/family education and to maximize pt's level of independence in the home and community environment.     Patient's spiritual, cultural, and educational needs considered and patient agreeable to plan of care and goals.           Plan:      Goals:   Active       Long Term Goals       Patient will be able to perform tall kneeling pain-free to improve tolerance to kneeling.        Start:  02/11/25    Expected End:  03/25/25            Patient will be able to perform 10 repetitions of double leg squats to improve tolerance to closed chain activities.       Start:  02/11/25    Expected End:  03/25/25            Patient will deny pain at work to return to prior level of function.       Start:  02/11/25    Expected End:  03/25/25            Patient will be able to kneel onto his bed to improve functional activity tolerance.       Start:  02/11/25    Expected End:  03/25/25               Short Term Goals        Patient will be independent with home exercise program to improve functional compliance.        Start:  02/11/25    Expected End:  03/04/25            Patient will improve left knee range of motion to 0-0-120 degrees to improve functional mobility.       Start:  02/11/25            Patient will report decreased pain levels to <2/10 while ambulating to improve tolerance to long duration walking.       Start:  02/11/25    Expected End:  03/04/25                Ramu Hebert, PT

## 2025-02-17 ENCOUNTER — CLINICAL SUPPORT (OUTPATIENT)
Dept: REHABILITATION | Facility: HOSPITAL | Age: 55
End: 2025-02-17
Payer: COMMERCIAL

## 2025-02-17 DIAGNOSIS — R53.1 DECREASED STRENGTH: ICD-10-CM

## 2025-02-17 DIAGNOSIS — M25.662 DECREASED RANGE OF MOTION OF LEFT KNEE: Primary | ICD-10-CM

## 2025-02-17 PROCEDURE — 97530 THERAPEUTIC ACTIVITIES: CPT | Mod: PN

## 2025-02-17 PROCEDURE — 97140 MANUAL THERAPY 1/> REGIONS: CPT | Mod: PN

## 2025-02-17 PROCEDURE — 97112 NEUROMUSCULAR REEDUCATION: CPT | Mod: PN

## 2025-02-17 NOTE — PROGRESS NOTES
"  Outpatient Rehab    Physical Therapy Visit    Patient Name: Esa Yang  MRN: [id number]  YOB: 1970  Today's Date: 2/17/2025    Therapy Diagnosis: No diagnosis found.  Physician: Sergey Kruse MD    Physician Orders: Eval and Treat  Medical Diagnosis:   M17.11 (ICD-10-CM) - Primary osteoarthritis of right knee   M25.562 (ICD-10-CM) - Left knee pain, unspecified chronicity         Visit # / Visits Authorized:  1 / 10   Date of Evaluation:  2/11/2025   Insurance Authorization Period: 2/11/2025 to 12/31/2025  Plan of Care Certification:  2/11/2025 to 3/24/2025      Time In: 1100   Time Out: 1155  Total Time: 55   Total Billable Time: 55 minutes     FOTO:  Intake Score:  %  Survey Score 1:  %  Survey Score 2:  %         Subjective   Gross knee pain after last session. Pain medial and lateral aspect of knee. Was having difficulty walking Saturday and Sunday..  Pain reported as 6/10. Left knee medial and lateral aspect of knee    Objective            Treatment:  Therapeutic Exercise  Therapeutic Exercise Activity 1: BKFO: 15x5" bilateral  Therapeutic Exercise Activity 2: Heel slides: 20x5"  Therapeutic Exercise Activity 3: Hamstring stretch: 3x30"    Manual Therapy  Manual Therapy Activity 1: Patellar mobilizations - all directions  Manual Therapy Activity 2: Superior patellar mobilizations with passive terminal knee extension  Manual Therapy Activity 3: STM with theracane to left ITB    Balance/Neuromuscular Re-Education  Balance/Neuromuscular Re-Education Activity 1: Terminal knee extensions: green theraband 20x5" left  Balance/Neuromuscular Re-Education Activity 2: SLR: 3x10  Balance/Neuromuscular Re-Education Activity 3: Sidelying clamshells: green band 3x10  Balance/Neuromuscular Re-Education Activity 4: Sidelying reverse clamshelS: 3x10  Balance/Neuromuscular Re-Education Activity 5: TKEs: green band - 20x5" left  Balance/Neuromuscular Re-Education Activity 6: Long lever hamstring " bridges: 3x10 on peanut    Therapeutic Activity  Therapeutic Activity 1: Cybex leg press: 10 plates 3x10  Therapeutic Activity 2: Cybex hamstring curls: 5 plates 3x10              Assessment & Plan   Assessment: Patient presents with complaints of bilateral knee pain. History of left hamstring tendon repair, ACL/MCL repair and recent left TKA on 2024. Right knee MRI demonstrates a complex tear of the posterior horn lateral meniscus and tricompartmental cartilage loss. Continued articular clicking with open chain hinging. Reduces with postional change. ITB tightness noted as well as hip mobility deficits. STM to ITB for tissue restrictions and mobility. Held closed chain quadriceps loading due to pain previous session.  Evaluation/Treatment Tolerance: Patient tolerated treatment well    Patient will continue to benefit from skilled outpatient physical therapy to address the deficits listed in the problem list box on initial evaluation, provide pt/family education and to maximize pt's level of independence in the home and community environment.     Patient's spiritual, cultural, and educational needs considered and patient agreeable to plan of care and goals.           Plan: Gross knee stabilization, knee ROM, hip mobility, posterolateral hip strengthening    Goals:   Active       Long Term Goals       Patient will be able to perform tall kneeling pain-free to improve tolerance to kneeling.  (Progressing)       Start:  02/11/25    Expected End:  03/25/25            Patient will be able to perform 10 repetitions of double leg squats to improve tolerance to closed chain activities. (Progressing)       Start:  02/11/25    Expected End:  03/25/25            Patient will deny pain at work to return to prior level of function. (Progressing)       Start:  02/11/25    Expected End:  03/25/25            Patient will be able to kneel onto his bed to improve functional activity tolerance. (Progressing)       Start:  02/11/25     Expected End:  03/25/25               Short Term Goals        Patient will be independent with home exercise program to improve functional compliance.  (Progressing)       Start:  02/11/25    Expected End:  03/04/25            Patient will improve left knee range of motion to 0-0-120 degrees to improve functional mobility. (Progressing)       Start:  02/11/25            Patient will report decreased pain levels to <2/10 while ambulating to improve tolerance to long duration walking. (Progressing)       Start:  02/11/25    Expected End:  03/04/25                Juana Ribeiro PT

## 2025-02-21 ENCOUNTER — CLINICAL SUPPORT (OUTPATIENT)
Dept: REHABILITATION | Facility: HOSPITAL | Age: 55
End: 2025-02-21
Payer: COMMERCIAL

## 2025-02-21 DIAGNOSIS — M17.11 PRIMARY OSTEOARTHRITIS OF RIGHT KNEE: ICD-10-CM

## 2025-02-21 DIAGNOSIS — M25.562 LEFT KNEE PAIN, UNSPECIFIED CHRONICITY: ICD-10-CM

## 2025-02-21 DIAGNOSIS — R53.1 DECREASED STRENGTH: ICD-10-CM

## 2025-02-21 DIAGNOSIS — M25.662 DECREASED RANGE OF MOTION OF LEFT KNEE: Primary | ICD-10-CM

## 2025-02-21 PROCEDURE — 97112 NEUROMUSCULAR REEDUCATION: CPT | Mod: PN

## 2025-02-21 PROCEDURE — 97530 THERAPEUTIC ACTIVITIES: CPT | Mod: PN

## 2025-02-21 PROCEDURE — 97110 THERAPEUTIC EXERCISES: CPT | Mod: PN,CQ

## 2025-02-21 PROCEDURE — 97140 MANUAL THERAPY 1/> REGIONS: CPT | Mod: PN,CQ

## 2025-02-21 NOTE — PROGRESS NOTES
"  Outpatient Rehab    Physical Therapy Visit    Patient Name: Esa Yang  MRN: [id number]  YOB: 1970  Today's Date: 2/21/2025    Therapy Diagnosis:   Encounter Diagnoses   Name Primary?    Decreased range of motion of left knee Yes    Decreased strength     Left knee pain, unspecified chronicity     Primary osteoarthritis of right knee        Physician: Sergey Kruse MD    Physician Orders: Eval and Treat  Medical Diagnosis:   M17.11 (ICD-10-CM) - Primary osteoarthritis of right knee   M25.562 (ICD-10-CM) - Left knee pain, unspecified chronicity         Visit # / Visits Authorized:  2 / 10   Date of Evaluation:  2/11/2025   Insurance Authorization Period: 2/11/2025 to 12/31/2025  Plan of Care Certification:  2/11/2025 to 3/24/2025                 Time In: 1200   Time Out: 1255  Total Time: 55   Total Billable Time: 55 minutes     FOTO:  Intake Score:  %  Survey Score 1:  %  Survey Score 2:  %         Subjective   Gross knee pain after last session. Pain medial and lateral aspect of knee. Usually sore after I leave here..    Left knee medial and lateral aspect of knee    Objective            Treatment:  Therapeutic Exercise  Therapeutic Exercise Activity 1: BKFO: 15x5" bilateral  Therapeutic Exercise Activity 2: Heel slides: 20x5"  Therapeutic Exercise Activity 3: Standing Hamstring stretch: 3x30" foot on 3rd step         Balance/Neuromuscular Re-Education  Balance/Neuromuscular Re-Education Activity 1: Quad set: 5"x20  Balance/Neuromuscular Re-Education Activity 2: SLR: 3x10-2#  Balance/Neuromuscular Re-Education Activity 3: Sidelying clamshells: green band 3x10  Balance/Neuromuscular Re-Education Activity 4: Sidelying reverse clamshelS: 3x10  Balance/Neuromuscular Re-Education Activity 5: TKEs: green band - 20x5" left  Balance/Neuromuscular Re-Education Activity 6: Long lever hamstring bridges: 3x10 on peanut    Therapeutic Activity  Therapeutic Activity 1: Cybex leg press: 10 plates " "3x10  Therapeutic Activity 2: Cybex hamstring curls: 6 plates 3x10 (dL)  Therapeutic Activity 3: Gastroc fitter stretch: 3x30"  Therapeutic Activity 4: Soleus fitter oscillations: 20x    Assessment & Plan   Assessment: Patient presents with complaints of bilateral knee pain. History of left hamstring tendon repair, ACL/MCL repair and recent left TKA on 2024. Right knee MRI demonstrates a complex tear of the posterior horn lateral meniscus and tricompartmental cartilage loss. Continued articular clicking with open chain hinging. Reduces with postional change. ITB tightness noted as well as hip mobility deficits. STM to ITB for tissue restrictions and mobility. Held closed chain quadriceps loading due to pain previous session.       Patient will continue to benefit from skilled outpatient physical therapy to address the deficits listed in the problem list box on initial evaluation, provide pt/family education and to maximize pt's level of independence in the home and community environment.     Patient's spiritual, cultural, and educational needs considered and patient agreeable to plan of care and goals.           Plan: Gross knee stabilization, knee ROM, hip mobility, posterolateral hip strengthening    Goals:   Active       Long Term Goals       Patient will be able to perform tall kneeling pain-free to improve tolerance to kneeling.  (Progressing)       Start:  02/11/25    Expected End:  03/25/25            Patient will be able to perform 10 repetitions of double leg squats to improve tolerance to closed chain activities. (Progressing)       Start:  02/11/25    Expected End:  03/25/25            Patient will deny pain at work to return to prior level of function. (Progressing)       Start:  02/11/25    Expected End:  03/25/25            Patient will be able to kneel onto his bed to improve functional activity tolerance. (Progressing)       Start:  02/11/25    Expected End:  03/25/25               Short Term Goals   "      Patient will be independent with home exercise program to improve functional compliance.  (Progressing)       Start:  02/11/25    Expected End:  03/04/25            Patient will improve left knee range of motion to 0-0-120 degrees to improve functional mobility. (Progressing)       Start:  02/11/25            Patient will report decreased pain levels to <2/10 while ambulating to improve tolerance to long duration walking. (Progressing)       Start:  02/11/25    Expected End:  03/04/25                Zeeshan Taylor, PTA

## 2025-02-28 ENCOUNTER — CLINICAL SUPPORT (OUTPATIENT)
Dept: REHABILITATION | Facility: HOSPITAL | Age: 55
End: 2025-02-28
Payer: COMMERCIAL

## 2025-02-28 DIAGNOSIS — M17.11 PRIMARY OSTEOARTHRITIS OF RIGHT KNEE: ICD-10-CM

## 2025-02-28 DIAGNOSIS — R53.1 DECREASED STRENGTH: ICD-10-CM

## 2025-02-28 DIAGNOSIS — M25.562 LEFT KNEE PAIN, UNSPECIFIED CHRONICITY: ICD-10-CM

## 2025-02-28 DIAGNOSIS — G89.29 CHRONIC PAIN OF LEFT KNEE: ICD-10-CM

## 2025-02-28 DIAGNOSIS — M25.662 DECREASED RANGE OF MOTION OF LEFT KNEE: Primary | ICD-10-CM

## 2025-02-28 DIAGNOSIS — M25.562 CHRONIC PAIN OF LEFT KNEE: ICD-10-CM

## 2025-02-28 PROCEDURE — 97112 NEUROMUSCULAR REEDUCATION: CPT | Mod: PN

## 2025-02-28 PROCEDURE — 97110 THERAPEUTIC EXERCISES: CPT | Mod: PN,CQ

## 2025-02-28 PROCEDURE — 97530 THERAPEUTIC ACTIVITIES: CPT | Mod: PN

## 2025-02-28 PROCEDURE — 97140 MANUAL THERAPY 1/> REGIONS: CPT | Mod: PN,CQ

## 2025-02-28 NOTE — PROGRESS NOTES
"  Outpatient Rehab    Physical Therapy Visit    Patient Name: Esa Yang  MRN: [id number]  YOB: 1970  Encounter Date: 2/28/2025    Therapy Diagnosis:  Encounter Diagnoses   Name Primary?    Decreased range of motion of left knee Yes    Decreased strength     Left knee pain, unspecified chronicity     Primary osteoarthritis of right knee     Chronic pain of left knee        Physician: Sergey Kruse MD    PMedical Diagnosis:   M17.11 (ICD-10-CM) - Primary osteoarthritis of right knee   M25.562 (ICD-10-CM) - Left knee pain, unspecified chronicity         Visit # / Visits Authorized:  2 / 10   Date of Evaluation:  2/11/2025   Insurance Authorization Period: 2/11/2025 to 12/31/2025  Plan of Care Certification:  2/11/2025 to 3/24/2025                 Time In: 1200   Time Out: 1255  Total Time: 55   Total Billable Time: 55 minutes    hysician Orders: Eval and Treat    FOTO:  Intake Score:  %  Survey Score 1:  %  Survey Score 2:  %         Subjective   Gross knee pain after last session. Pain medial and lateral aspect of knee. Usually sore after I leave here."Today is the best day od the last 4 days. No pain at rest. Walking is the most  painful 10/10.    Left knee medial and lateral aspect of knee    Objective            Treatment:  Therapeutic Exercise  Therapeutic Exercise Activity 1: BKFO: 15x5" bilateral  Therapeutic Exercise Activity 2: Heel slides: 20x5"  Therapeutic Exercise Activity 3: Standing Hamstring stretch: 3x30" foot on 3rd step    Manual Therapy  Manual Therapy Activity 1: Patellar mobilizations - all directions  Manual Therapy Activity 2: Superior patellar mobilizations with passive terminal knee extension  Manual Therapy Activity 3: Static and dynamic cupping of IT nand and distal lateral knee    Balance/Neuromuscular Re-Education  Balance/Neuromuscular Re-Education Activity 1: Quad set: 5"x20  Balance/Neuromuscular Re-Education Activity 2: SLR: 3x10-2# B  Balance/Neuromuscular " "Re-Education Activity 3: Sidelying clamshells: green band 3x10 B  Balance/Neuromuscular Re-Education Activity 4: Sidelying reverse clamshells: 3x10 B  Balance/Neuromuscular Re-Education Activity 5: TKEs: green band - 20x5" left  Balance/Neuromuscular Re-Education Activity 6: Long lever hamstring bridges: 3x10 on peanut    Therapeutic Activity  Therapeutic Activity 1: Cybex leg press: 10 plates 3x10  Therapeutic Activity 2: Cybex hamstring curls: 6 plates 3x10 (dL)  Therapeutic Activity 3: Gastroc fitter stretch: 3x30"  Therapeutic Activity 4: Soleus fitter oscillations: 20x         Assessment & Plan   Assessment: Patient presents with complaints of bilateral knee pain. History of left hamstring tendon repair, ACL/MCL repair and recent left TKA on 2024. Right knee MRI demonstrates a complex tear of the posterior horn lateral meniscus and tricompartmental cartilage loss. Continued articular clicking with open chain hinging. Reduces with postional change. ITB tightness noted as well as hip mobility deficits. STM to ITB for tissue restrictions and mobility. Held closed chain quadriceps loading due to pain previous session.       Patient will continue to benefit from skilled outpatient physical therapy to address the deficits listed in the problem list box on initial evaluation, provide pt/family education and to maximize pt's level of independence in the home and community environment.     Patient's spiritual, cultural, and educational needs considered and patient agreeable to plan of care and goals.           Plan: Gross knee stabilization, knee ROM, hip mobility, posterolateral hip strengthening    Goals:   Active       Long Term Goals       Patient will be able to perform tall kneeling pain-free to improve tolerance to kneeling.  (Progressing)       Start:  02/11/25    Expected End:  03/25/25            Patient will be able to perform 10 repetitions of double leg squats to improve tolerance to closed chain " activities. (Progressing)       Start:  02/11/25    Expected End:  03/25/25            Patient will deny pain at work to return to prior level of function. (Progressing)       Start:  02/11/25    Expected End:  03/25/25            Patient will be able to kneel onto his bed to improve functional activity tolerance. (Progressing)       Start:  02/11/25    Expected End:  03/25/25               Short Term Goals        Patient will be independent with home exercise program to improve functional compliance.  (Progressing)       Start:  02/11/25    Expected End:  03/04/25            Patient will improve left knee range of motion to 0-0-120 degrees to improve functional mobility. (Progressing)       Start:  02/11/25            Patient will report decreased pain levels to <2/10 while ambulating to improve tolerance to long duration walking. (Progressing)       Start:  02/11/25    Expected End:  03/04/25                Zeeshan Taylor PTA

## 2025-03-03 ENCOUNTER — CLINICAL SUPPORT (OUTPATIENT)
Dept: REHABILITATION | Facility: HOSPITAL | Age: 55
End: 2025-03-03
Payer: COMMERCIAL

## 2025-03-03 DIAGNOSIS — R53.1 DECREASED STRENGTH: ICD-10-CM

## 2025-03-03 DIAGNOSIS — M25.662 DECREASED RANGE OF MOTION OF LEFT KNEE: Primary | ICD-10-CM

## 2025-03-03 PROCEDURE — 97530 THERAPEUTIC ACTIVITIES: CPT | Mod: PN

## 2025-03-03 NOTE — PROGRESS NOTES
Outpatient Rehab    Physical Therapy Visit    Patient Name: Esa Yang  MRN: [id number]  YOB: 1970  Encounter Date: 3/3/2025    Therapy Diagnosis:  Encounter Diagnoses   Name Primary?    Decreased range of motion of left knee Yes    Decreased strength          Physician: Sergey Kruse MD    Medical Diagnosis:   M17.11 (ICD-10-CM) - Primary osteoarthritis of right knee   M25.562 (ICD-10-CM) - Left knee pain, unspecified chronicity         Visit # / Visits Authorized:  5 / 10   Date of Evaluation:  2/11/2025   Insurance Authorization Period: 2/11/2025 to 12/31/2025  Plan of Care Certification:  2/11/2025 to 3/24/2025                 Time In: 1500   Time Out: 1530  Total Time: 30  Total Billable Time: 30 minutes       FOTO:  Intake Score:  %  Survey Score 1:  %  Survey Score 2:  %         Subjective   Continued knee pain. Can feels his knee clunking as he walks or kicks it out in an open chain position. Feels as if therapy makes it worse..  Pain reported as 6/10. Left knee medial and lateral aspect of knee    Objective          MicroFet testing  Quadriceps: Left (52.5 kg) and Right (34 kg)  Hamstring: Left (15.5 kg) and Right (16.0 kg)    Right Knee    Active (deg) Passive (deg) Pain   Flexion 123       Extension 2             Left Knee    Active (deg) Passive (deg) Pain   Flexion 122       Extension 2            Observation: palpable clicking with open chain knee extension      Treatment:     Therapeutic activities to improve functional performance for 30 minutes, including:  FOTO  Objective tests and measures  Outcome measures  Home exercise program overview   Questions and answers   Discharge       Assessment & Plan   Assessment: Patient presents with complaints of bilateral knee pain. History of left hamstring tendon repair, ACL/MCL repair and recent left TKA on 2024. Right knee non-bothersome. On left knee, continued articular clicking with open chain knee hinging. Reduces with  positional change. Verbalizes instability and clunking during ambulation. Suspect instability with joint replacement. Referring patient back to MD for further testing as physical therapy increases patient's pain levels. Updated home exercise program. Patient discharged at this time.  Evaluation/Treatment Tolerance: Patient tolerated treatment well    Patient will continue to benefit from skilled outpatient physical therapy to address the deficits listed in the problem list box on initial evaluation, provide pt/family education and to maximize pt's level of independence in the home and community environment.     Patient's spiritual, cultural, and educational needs considered and patient agreeable to plan of care and goals.           Plan: discharge.    Goals:   Active       Long Term Goals       Patient will be able to perform tall kneeling pain-free to improve tolerance to kneeling.  (Unable to Meet)       Start:  02/11/25    Expected End:  03/25/25            Patient will be able to perform 10 repetitions of double leg squats to improve tolerance to closed chain activities. (Met)       Start:  02/11/25    Expected End:  03/25/25    Resolved:  03/24/25         Patient will deny pain at work to return to prior level of function. (Unable to Meet)       Start:  02/11/25    Expected End:  03/25/25            Patient will be able to kneel onto his bed to improve functional activity tolerance. (Unable to Meet)       Start:  02/11/25    Expected End:  03/25/25              Resolved       Short Term Goals        Patient will be independent with home exercise program to improve functional compliance.  (Met)       Start:  02/11/25    Expected End:  03/04/25    Resolved:  03/24/25         Patient will improve left knee range of motion to 0-0-120 degrees to improve functional mobility. (Met)       Start:  02/11/25    Resolved:  03/24/25         Patient will report decreased pain levels to <2/10 while ambulating to improve  tolerance to long duration walking. (Met)       Start:  02/11/25    Expected End:  03/04/25    Resolved:  03/24/25             Juana Ribeiro PT

## 2025-05-21 DIAGNOSIS — Z96.652 S/P TOTAL KNEE REPLACEMENT, LEFT: Primary | ICD-10-CM

## 2025-05-28 ENCOUNTER — OFFICE VISIT (OUTPATIENT)
Dept: ORTHOPEDICS | Facility: CLINIC | Age: 55
End: 2025-05-28
Payer: COMMERCIAL

## 2025-05-28 ENCOUNTER — HOSPITAL ENCOUNTER (OUTPATIENT)
Dept: RADIOLOGY | Facility: HOSPITAL | Age: 55
Discharge: HOME | End: 2025-05-28
Attending: ORTHOPAEDIC SURGERY
Payer: COMMERCIAL

## 2025-05-28 VITALS — HEIGHT: 72 IN | BODY MASS INDEX: 37.97 KG/M2 | WEIGHT: 280.31 LBS

## 2025-05-28 DIAGNOSIS — M25.561 CHRONIC PAIN OF RIGHT KNEE: ICD-10-CM

## 2025-05-28 DIAGNOSIS — G89.29 CHRONIC PAIN OF RIGHT KNEE: ICD-10-CM

## 2025-05-28 DIAGNOSIS — Z96.652 S/P TOTAL KNEE REPLACEMENT, LEFT: ICD-10-CM

## 2025-05-28 DIAGNOSIS — Z96.652 PRESENCE OF LEFT ARTIFICIAL KNEE JOINT: Primary | ICD-10-CM

## 2025-05-28 PROCEDURE — 3008F BODY MASS INDEX DOCD: CPT | Mod: CPTII,S$GLB,, | Performed by: ORTHOPAEDIC SURGERY

## 2025-05-28 PROCEDURE — 99999 PR PBB SHADOW E&M-EST. PATIENT-LVL III: CPT | Mod: PBBFAC,,, | Performed by: ORTHOPAEDIC SURGERY

## 2025-05-28 PROCEDURE — 1159F MED LIST DOCD IN RCRD: CPT | Mod: CPTII,S$GLB,, | Performed by: ORTHOPAEDIC SURGERY

## 2025-05-28 PROCEDURE — 73562 X-RAY EXAM OF KNEE 3: CPT | Mod: 26,,, | Performed by: RADIOLOGY

## 2025-05-28 PROCEDURE — 73562 X-RAY EXAM OF KNEE 3: CPT | Mod: TC,50

## 2025-05-28 PROCEDURE — 99213 OFFICE O/P EST LOW 20 MIN: CPT | Mod: S$GLB,,, | Performed by: ORTHOPAEDIC SURGERY

## 2025-05-28 NOTE — PROGRESS NOTES
Subjective:      Patient ID: Esa Yang is a 55 y.o. male.    Chief Complaint: Pain of the Left Knee and Pain of the Right Knee    HPI  Esa Yang has bilateral knee pain.  Occasional right knee pain which can be severe at times.  He has seen Dr. Kruse for this.  The left knee it is not painful.  Occasionally he feels like a hamstring tendon gets caught posteriorly and he has difficulty gaining full flexion.  This resolves quickly.  He is somewhat concerned about this.  Review of Systems   Constitutional: Negative for chills, fever and night sweats.   HENT:  Negative for hearing loss.    Eyes:  Negative for blurred vision and double vision.   Cardiovascular:  Negative for chest pain, claudication and leg swelling.   Respiratory:  Negative for shortness of breath.    Endocrine: Negative for polydipsia, polyphagia and polyuria.   Hematologic/Lymphatic: Negative for adenopathy and bleeding problem. Does not bruise/bleed easily.   Skin:  Negative for poor wound healing.   Gastrointestinal:  Negative for diarrhea and heartburn.   Genitourinary:  Negative for bladder incontinence.   Neurological:  Negative for focal weakness, headaches, numbness, paresthesias and sensory change.   Psychiatric/Behavioral:  The patient is not nervous/anxious.    Allergic/Immunologic: Negative for persistent infections.         Objective:      Body mass index is 38.02 kg/m².  Vitals:    05/28/25 1549   Weight: 127.2 kg (280 lb 5 oz)   Height: 6' (1.829 m)           General    Constitutional: He is oriented to person, place, and time. He appears well-developed and well-nourished.   HENT:   Head: Normocephalic and atraumatic.   Eyes: EOM are normal.   Cardiovascular:  Normal rate.            Pulmonary/Chest: Effort normal.   Neurological: He is alert and oriented to person, place, and time.   Psychiatric: He has a normal mood and affect. His behavior is normal.     General Musculoskeletal Exam   Gait: normal       Right Knee  Exam     Inspection   Erythema: absent  Scars: absent  Swelling: present (mils)  Effusion: absent  Deformity: absent  Bruising: absent    Tenderness   The patient is experiencing no tenderness.     Range of Motion   Extension:  0   Flexion:  130     Tests   Ligament Examination   Lachman: normal (-1 to 2mm)   MCL - Valgus: normal (0 to 2mm)  LCL - Varus: normal  Patella   Passive Patellar Tilt: neutral    Other   Sensation: normal    Left Knee Exam     Inspection   Erythema: absent  Scars: present  Swelling: present  Effusion: absent  Deformity: absent  Bruising: absent    Tenderness   The patient is experiencing no tenderness.     Range of Motion   Extension:  0   Flexion:  130     Tests   Stability   Lachman: normal (-1 to 2mm)   MCL - Valgus: normal (0 to 2mm)  LCL - Varus: normal (0 to 2mm)  Patella   Passive Patellar Tilt: neutral    Other   Sensation: normal    Muscle Strength   Right Lower Extremity   Hip Abduction: 5/5   Quadriceps:  5/5   Hamstrin/5   Left Lower Extremity   Hip Abduction: 5/5   Quadriceps:  5/5   Hamstrin/5     Vascular Exam     Right Pulses  Dorsalis Pedis:      2+          Left Pulses  Dorsalis Pedis:      2+          Edema  Right Lower Leg: absent  Left Lower Leg: absent  I could not get the locking to occur in the left knee.      Radiographs obtained today and reviewed by me demonstrate well-fixed and positioned left total knee arthroplasty.  There is Kellgren Fam grade 2-3 changes of the right knee.          Assessment:       Encounter Diagnoses   Name Primary?    Presence of left artificial knee joint Yes    Chronic pain of right knee           Plan:       Esa was seen today for pain and pain.    Diagnoses and all orders for this visit:    Presence of left artificial knee joint  -     MRI Knee Without Contrast Left; Future    Chronic pain of right knee      We will get a mars MRI of the left knee to determine if there is an issue with his hamstring tendon.

## 2025-06-04 ENCOUNTER — OFFICE VISIT (OUTPATIENT)
Dept: UROLOGY | Facility: CLINIC | Age: 55
End: 2025-06-04
Payer: COMMERCIAL

## 2025-06-04 VITALS
OXYGEN SATURATION: 96 % | DIASTOLIC BLOOD PRESSURE: 80 MMHG | BODY MASS INDEX: 37.67 KG/M2 | HEART RATE: 98 BPM | WEIGHT: 277.75 LBS | SYSTOLIC BLOOD PRESSURE: 126 MMHG

## 2025-06-04 DIAGNOSIS — Z96.89 HISTORY OF IMPLANTATION OF PENILE PROSTHESIS: ICD-10-CM

## 2025-06-04 DIAGNOSIS — N40.1 BENIGN PROSTATIC HYPERPLASIA WITH URINARY FREQUENCY: Primary | ICD-10-CM

## 2025-06-04 DIAGNOSIS — R35.0 BENIGN PROSTATIC HYPERPLASIA WITH URINARY FREQUENCY: Primary | ICD-10-CM

## 2025-06-04 DIAGNOSIS — N52.03 COMBINED ARTERIAL INSUFFICIENCY AND CORPORO-VENOUS OCCLUSIVE ERECTILE DYSFUNCTION: ICD-10-CM

## 2025-06-04 PROCEDURE — 1160F RVW MEDS BY RX/DR IN RCRD: CPT | Mod: CPTII,S$GLB,, | Performed by: UROLOGY

## 2025-06-04 PROCEDURE — 3008F BODY MASS INDEX DOCD: CPT | Mod: CPTII,S$GLB,, | Performed by: UROLOGY

## 2025-06-04 PROCEDURE — 3079F DIAST BP 80-89 MM HG: CPT | Mod: CPTII,S$GLB,, | Performed by: UROLOGY

## 2025-06-04 PROCEDURE — 1159F MED LIST DOCD IN RCRD: CPT | Mod: CPTII,S$GLB,, | Performed by: UROLOGY

## 2025-06-04 PROCEDURE — 99999 PR PBB SHADOW E&M-EST. PATIENT-LVL III: CPT | Mod: PBBFAC,,, | Performed by: UROLOGY

## 2025-06-04 PROCEDURE — 99215 OFFICE O/P EST HI 40 MIN: CPT | Mod: S$GLB,,, | Performed by: UROLOGY

## 2025-06-04 PROCEDURE — 3074F SYST BP LT 130 MM HG: CPT | Mod: CPTII,S$GLB,, | Performed by: UROLOGY

## 2025-06-04 RX ORDER — SILDENAFIL 100 MG/1
100 TABLET, FILM COATED ORAL DAILY PRN
Qty: 10 TABLET | Refills: 11 | Status: SHIPPED | OUTPATIENT
Start: 2025-06-04 | End: 2026-06-04

## 2025-06-04 RX ORDER — TAMSULOSIN HYDROCHLORIDE 0.4 MG/1
0.8 CAPSULE ORAL DAILY
Qty: 180 CAPSULE | Refills: 3 | Status: SHIPPED | OUTPATIENT
Start: 2025-06-04 | End: 2026-06-04

## 2025-06-04 RX ORDER — METRONIDAZOLE 500 MG/1
500 TABLET ORAL EVERY 12 HOURS
Qty: 28 TABLET | Refills: 0 | Status: SHIPPED | OUTPATIENT
Start: 2025-06-04 | End: 2025-06-18

## 2025-06-04 RX ORDER — TAMSULOSIN HYDROCHLORIDE 0.4 MG/1
0.4 CAPSULE ORAL DAILY
Qty: 90 CAPSULE | Refills: 3 | Status: SHIPPED | OUTPATIENT
Start: 2025-06-04 | End: 2025-06-04 | Stop reason: SDUPTHER

## 2025-06-04 RX ORDER — POTASSIUM CITRATE 1080 MG/1
10 TABLET, EXTENDED RELEASE ORAL
Qty: 90 TABLET | Refills: 11 | Status: SHIPPED | OUTPATIENT
Start: 2025-06-04 | End: 2026-06-04

## 2025-06-10 ENCOUNTER — TELEPHONE (OUTPATIENT)
Dept: ORTHOPEDICS | Facility: CLINIC | Age: 55
End: 2025-06-10
Payer: COMMERCIAL

## 2025-06-10 ENCOUNTER — HOSPITAL ENCOUNTER (OUTPATIENT)
Dept: RADIOLOGY | Facility: HOSPITAL | Age: 55
Discharge: HOME | End: 2025-06-10
Attending: ORTHOPAEDIC SURGERY
Payer: COMMERCIAL

## 2025-06-10 ENCOUNTER — RESULTS FOLLOW-UP (OUTPATIENT)
Dept: ORTHOPEDICS | Facility: CLINIC | Age: 55
End: 2025-06-10

## 2025-06-10 DIAGNOSIS — Z96.652 PRESENCE OF LEFT ARTIFICIAL KNEE JOINT: ICD-10-CM

## 2025-06-10 PROCEDURE — 73721 MRI JNT OF LWR EXTRE W/O DYE: CPT | Mod: TC,LT

## 2025-06-10 PROCEDURE — 73721 MRI JNT OF LWR EXTRE W/O DYE: CPT | Mod: 26,LT,, | Performed by: RADIOLOGY

## 2025-06-10 NOTE — TELEPHONE ENCOUNTER
Called pt and informed him that Dr. Ortiz reviewed his MRI and it showed sine nonspecific thickening of the joint lining and nothing with the hamstring. Scheduled pt for 6/25 at 3:00 PM.   ----- Message -----  From: Romeo Ortiz MD  Sent: 6/10/2025   3:28 PM CDT  To: [facility] Staff    Please call patient.  The MRI demonstrated some nonspecific thickening of the joint lining.  Nothing with the hamstring tendon.  Schedule follow-up next available  ----- Message -----  From: Interface, Rad Results In  Sent: 6/10/2025   2:37 PM CDT  To: Romeo Ortiz MD

## 2025-06-25 ENCOUNTER — OFFICE VISIT (OUTPATIENT)
Dept: ORTHOPEDICS | Facility: CLINIC | Age: 55
End: 2025-06-25
Payer: COMMERCIAL

## 2025-06-25 VITALS — WEIGHT: 290.56 LBS | HEIGHT: 72 IN | BODY MASS INDEX: 39.36 KG/M2

## 2025-06-25 DIAGNOSIS — M76.52 PATELLAR TENDINITIS OF LEFT KNEE: Primary | ICD-10-CM

## 2025-06-25 PROCEDURE — 1159F MED LIST DOCD IN RCRD: CPT | Mod: CPTII,S$GLB,, | Performed by: ORTHOPAEDIC SURGERY

## 2025-06-25 PROCEDURE — 99214 OFFICE O/P EST MOD 30 MIN: CPT | Mod: S$GLB,,, | Performed by: ORTHOPAEDIC SURGERY

## 2025-06-25 PROCEDURE — 99999 PR PBB SHADOW E&M-EST. PATIENT-LVL III: CPT | Mod: PBBFAC,,, | Performed by: ORTHOPAEDIC SURGERY

## 2025-06-25 PROCEDURE — 3008F BODY MASS INDEX DOCD: CPT | Mod: CPTII,S$GLB,, | Performed by: ORTHOPAEDIC SURGERY

## 2025-06-25 RX ORDER — MELOXICAM 15 MG/1
15 TABLET ORAL DAILY
Qty: 30 TABLET | Refills: 1 | Status: SHIPPED | OUTPATIENT
Start: 2025-06-25 | End: 2025-08-24

## 2025-06-25 NOTE — PROGRESS NOTES
Subjective:      Patient ID: Esa Yang is a 55 y.o. male.    Chief Complaint: Pain of the Left Knee      History of Present Illness    CHIEF COMPLAINT:  - Left knee pain    HPI:  - presents for follow-up with left knee pain worsened over the past six months  - pain localized to the front of the knee, distinct from a previously mentioned numb area  - knee consistently moves involuntarily when sitting down, resolved by applying pressure  - wife has noticed a limp  - denies any recent injury or change in activity causing increased pain  - questions if overexerting himself, as MRI report mentioned thickening possibly due to overuse  - mentions longstanding issue with knee locking, giving out, or catching, but not primary concern during this visit  - denies any new locking, giving out, or catching of the knee         Review of Systems   Constitutional: Negative for chills, fever and night sweats.   HENT:  Negative for hearing loss.    Eyes:  Negative for blurred vision and double vision.   Cardiovascular:  Negative for chest pain, claudication and leg swelling.   Respiratory:  Negative for shortness of breath.    Endocrine: Negative for polydipsia, polyphagia and polyuria.   Hematologic/Lymphatic: Negative for adenopathy and bleeding problem. Does not bruise/bleed easily.   Skin:  Negative for poor wound healing.   Gastrointestinal:  Negative for diarrhea and heartburn.   Genitourinary:  Negative for bladder incontinence.   Neurological:  Negative for focal weakness, headaches, numbness, paresthesias and sensory change.   Psychiatric/Behavioral:  The patient is not nervous/anxious.    Allergic/Immunologic: Negative for persistent infections.         Objective:      Body mass index is 39.41 kg/m².  Vitals:    06/25/25 0915   Weight: 131.8 kg (290 lb 9.1 oz)   Height: 6' (1.829 m)           General    Constitutional: He is oriented to person, place, and time. He appears well-developed and well-nourished.   HENT:    Head: Normocephalic and atraumatic.   Eyes: EOM are normal.   Cardiovascular:  Normal rate.            Pulmonary/Chest: Effort normal.   Neurological: He is alert and oriented to person, place, and time.   Psychiatric: He has a normal mood and affect. His behavior is normal.             Left Knee Exam     Inspection   Erythema: absent  Scars: present  Swelling: present  Effusion: absent  Deformity: absent  Bruising: absent    Tenderness   The patient tender to palpation of the patellar tendon.    Range of Motion   Extension:  0   Flexion:  130     Tests   Stability   Lachman: normal (-1 to 2mm)   MCL - Valgus: normal (0 to 2mm)  LCL - Varus: normal (0 to 2mm)    Muscle Strength   Left Lower Extremity   Hip Abduction: 5/5   Quadriceps:  5/5   Hamstrin/5     Vascular Exam       Edema  Left Lower Leg: absent      Physical Exam    IMAGING:  - MRI Left Knee: Showed some nonspecific thickening of the joint lining. No specific findings were noted in the area where the hamstring was previously bothering the patient.               Assessment:       Encounter Diagnosis   Name Primary?    Patellar tendinitis of left knee Yes          Plan:       Esa was seen today for pain.    Diagnoses and all orders for this visit:    Patellar tendinitis of left knee    Other orders  -     meloxicam (MOBIC) 15 MG tablet; Take 1 tablet (15 mg total) by mouth once daily.      Assessment & Plan          Options discussed.    Esa Yang was given a prescription for Meloxicam.  The patient was given instructions on how to take the medication and side effects were discussed.  F/U in 6 weeks            This note was generated with the assistance of ambient listening technology. Verbal consent was obtained by the patient and accompanying visitor(s) for the recording of patient appointment to facilitate this note. I attest to having reviewed and edited the generated note for accuracy, though some syntax or spelling errors may  persist. Please contact the author of this note for any clarification.

## 2025-06-26 RX ORDER — TADALAFIL 5 MG/1
TABLET ORAL
Qty: 90 TABLET | Refills: 3 | Status: SHIPPED | OUTPATIENT
Start: 2025-06-26

## 2025-08-04 ENCOUNTER — OFFICE VISIT (OUTPATIENT)
Dept: ORTHOPEDICS | Facility: CLINIC | Age: 55
End: 2025-08-04
Payer: COMMERCIAL

## 2025-08-04 VITALS — WEIGHT: 283.31 LBS | BODY MASS INDEX: 38.37 KG/M2 | HEIGHT: 72 IN

## 2025-08-04 DIAGNOSIS — M76.52 PATELLAR TENDINITIS OF LEFT KNEE: ICD-10-CM

## 2025-08-04 DIAGNOSIS — Z96.652 PRESENCE OF LEFT ARTIFICIAL KNEE JOINT: Primary | ICD-10-CM

## 2025-08-04 PROCEDURE — 3008F BODY MASS INDEX DOCD: CPT | Mod: CPTII,S$GLB,, | Performed by: ORTHOPAEDIC SURGERY

## 2025-08-04 PROCEDURE — 1159F MED LIST DOCD IN RCRD: CPT | Mod: CPTII,S$GLB,, | Performed by: ORTHOPAEDIC SURGERY

## 2025-08-04 PROCEDURE — 99213 OFFICE O/P EST LOW 20 MIN: CPT | Mod: S$GLB,,, | Performed by: ORTHOPAEDIC SURGERY

## 2025-08-04 PROCEDURE — 99999 PR PBB SHADOW E&M-EST. PATIENT-LVL III: CPT | Mod: PBBFAC,,, | Performed by: ORTHOPAEDIC SURGERY

## 2025-08-04 RX ORDER — MELOXICAM 15 MG/1
15 TABLET ORAL DAILY
Qty: 30 TABLET | Refills: 1 | Status: SHIPPED | OUTPATIENT
Start: 2025-08-04 | End: 2025-10-03

## 2025-08-04 NOTE — PROGRESS NOTES
Subjective:      Patient ID: Esa Yang is a 55 y.o. male.    Chief Complaint: Pain of the Left Knee      History of Present Illness    CHIEF COMPLAINT:  - Left knee pain and catching    HPI:  - Presents for follow-up regarding left knee, localizing the issue posterior medially  - Describes a catching sensation and clicking sound when adjusting the knee  - Can bend the knee further back after readjustment  - Onset of symptoms dates back approximately 15-16 months  - Reports knee is manageable with anti-inflammatory medication  - Still experiences a catching problem which he can tolerate  - Knee remains somewhat sore  - When missing a few days of medication, he can feel it swell  - Attributes swelling to possibly working excessively  - Characterizes ability to manage symptoms: can cope with medication, but catching sensation persists  - Can adjust knee when catching occurs  - Acknowledges a need to lose weight    WORK STATUS:  - Works at [employer]        Review of Systems   Constitutional: Negative for chills, fever and night sweats.   HENT:  Negative for hearing loss.    Eyes:  Negative for blurred vision and double vision.   Cardiovascular:  Negative for chest pain, claudication and leg swelling.   Respiratory:  Negative for shortness of breath.    Endocrine: Negative for polydipsia, polyphagia and polyuria.   Hematologic/Lymphatic: Negative for adenopathy and bleeding problem. Does not bruise/bleed easily.   Skin:  Negative for poor wound healing.   Gastrointestinal:  Negative for diarrhea and heartburn.   Genitourinary:  Negative for bladder incontinence.   Neurological:  Negative for focal weakness, headaches, numbness, paresthesias and sensory change.   Psychiatric/Behavioral:  The patient is not nervous/anxious.    Allergic/Immunologic: Negative for persistent infections.         Objective:      Body mass index is 38.42 kg/m².  Vitals:    08/04/25 1005   Weight: 128.5 kg (283 lb 4.7 oz)    Height: 6' (1.829 m)           General    Constitutional: He is oriented to person, place, and time. He appears well-developed and well-nourished.   HENT:   Head: Normocephalic and atraumatic.   Eyes: EOM are normal.   Cardiovascular:  Normal rate.            Pulmonary/Chest: Effort normal.   Neurological: He is alert and oriented to person, place, and time.   Psychiatric: He has a normal mood and affect. His behavior is normal.             Left Knee Exam     Inspection   Erythema: absent  Scars: present  Swelling: present  Effusion: absent  Deformity: absent  Bruising: absent    Tenderness   The patient is experiencing no tenderness.     Range of Motion   Extension:  0   Flexion:  120     Tests   Stability   Lachman: normal (-1 to 2mm)   MCL - Valgus: normal (0 to 2mm)  LCL - Varus: normal (0 to 2mm)    Other   Popliteal (Baker's) Cyst: absent    Muscle Strength   Left Lower Extremity   Hip Abduction: 5/5   Quadriceps:  5/5   Hamstrin/5     Vascular Exam       Edema  Left Lower Leg: absent      Physical Exam    IMAGING:  - MRI Left Knee: Showed some scarring but no specific findings               Assessment:       Encounter Diagnoses   Name Primary?    Presence of left artificial knee joint Yes    Patellar tendinitis of left knee           Plan:       Esa was seen today for pain.    Diagnoses and all orders for this visit:    Presence of left artificial knee joint    Patellar tendinitis of left knee    Other orders  -     meloxicam (MOBIC) 15 MG tablet; Take 1 tablet (15 mg total) by mouth once daily.      Assessment & Plan                I renewed the meloxicam.  F/U in April with xrays and PROMS.  Sooner if needed      This note was generated with the assistance of ambient listening technology. Verbal consent was obtained by the patient and accompanying visitor(s) for the recording of patient appointment to facilitate this note. I attest to having reviewed and edited the generated note for accuracy,  though some syntax or spelling errors may persist. Please contact the author of this note for any clarification.

## (undated) DEVICE — GOWN ECLIPSE POLY REINF 3XL

## (undated) DEVICE — MIXER BONE CEMENT

## (undated) DEVICE — SCISSOR 5MMX35CM DIRECT DRIVE

## (undated) DEVICE — WRAP KNEE ACCU THERM GEL PACK

## (undated) DEVICE — WRAP PROTECTIVE LEG POS STRL

## (undated) DEVICE — DRAPE STERI INSTRUMENT 1018

## (undated) DEVICE — SUT ETHILON 3/0 18IN PS-1

## (undated) DEVICE — GLOVE BIOGEL ECLIPSE SZ 7.5

## (undated) DEVICE — TUBE SET INFLOW/OUTFLOW

## (undated) DEVICE — SUPPORT SLING SHOT II MEDIUM

## (undated) DEVICE — KIT CHECKPOINT MAKO

## (undated) DEVICE — ADHESIVE DERMABOND ADVANCED

## (undated) DEVICE — SUT MONOCRYL 4-0 PS-2

## (undated) DEVICE — DRESSING TEGADERM 2 3/8 X 2.75

## (undated) DEVICE — PACK SURGERY START

## (undated) DEVICE — SHAVER ULTRAFFR 4.2MM

## (undated) DEVICE — MANIFOLD 4 PORT

## (undated) DEVICE — PAD CAST SPECIALIST STRL 4

## (undated) DEVICE — APPLICATOR CHLORAPREP ORN 26ML

## (undated) DEVICE — Device

## (undated) DEVICE — SUT VICRYL PLUS 2-0 CT1 18

## (undated) DEVICE — DRAPE PLASTIC U 60X72

## (undated) DEVICE — NDL INSUF ULTRA VERESS 120MM

## (undated) DEVICE — DRAPE STERI U-SHAPED 47X51IN

## (undated) DEVICE — KIT VIZADISC KNEE TRACKING

## (undated) DEVICE — SET CEMENT (SCULP)

## (undated) DEVICE — GLOVE SENSICARE PI GRN 8

## (undated) DEVICE — ELECTRODE REM PLYHSV RETURN 9

## (undated) DEVICE — IRRIGATOR ENDOSCOPY DISP.

## (undated) DEVICE — BANDAGE ELASTIC 3X5 VELCRO ST

## (undated) DEVICE — TOWEL OR DISP STRL BLUE 4/PK

## (undated) DEVICE — DRESSING XEROFORM FOIL PK 1X8

## (undated) DEVICE — KIT IRR SUCTION HND PIECE

## (undated) DEVICE — TAPE SURG DURAPORE 2 X10YD

## (undated) DEVICE — CONN SMARTSTITCH PERFECTPASSER

## (undated) DEVICE — SEE MEDLINE ITEM 146231

## (undated) DEVICE — SEE MEDLINE ITEM 146308

## (undated) DEVICE — PAD ABD TNDRSRB 7.5X8 STRL

## (undated) DEVICE — BLADE SURG #15 CARBON STEEL

## (undated) DEVICE — HOOD T7 W/ PEEL AWAY LENS

## (undated) DEVICE — COVER CAMERA OPERATING ROOM

## (undated) DEVICE — TOURNIQUET SB QC DP 34X4IN

## (undated) DEVICE — SUT VICRYL+ 1 CT1 18IN

## (undated) DEVICE — SOL BETADINE 5%

## (undated) DEVICE — SOL NACL IRR 1000ML BTL

## (undated) DEVICE — SUT MONOCRYL 3-0 PS-1

## (undated) DEVICE — GLOVE SENSICARE PI ORTHO 8

## (undated) DEVICE — SUT VICRYL PLUS 0 CT1 18IN

## (undated) DEVICE — KIT TRIMANO CHIN

## (undated) DEVICE — DRAPE THREE-QTR REINF 53X77IN

## (undated) DEVICE — SUT CTD VICRYL BR CR/CT-2

## (undated) DEVICE — SUT STRATAFIX 3-0 30CM

## (undated) DEVICE — COVER LIGHT HANDLE 80/CA

## (undated) DEVICE — TUBING SUC UNIV W/CONN 12FT

## (undated) DEVICE — BUCKET PLASTER DISPOSABLE

## (undated) DEVICE — WAND COBLATION TURBOVAC 90

## (undated) DEVICE — UNDERGLOVES BIOGEL PI SIZE 8.5

## (undated) DEVICE — GLOVE BIOGEL SKINSENSE PI 6.5

## (undated) DEVICE — GLOVE SURG BIOGEL LATEX SZ 7.5

## (undated) DEVICE — COVER PROXIMA MAYO STAND

## (undated) DEVICE — BAG TISSUE RETRIEVAL 225ML

## (undated) DEVICE — TAPE ADH MEDIPORE 4 X 10YDS

## (undated) DEVICE — DRAPE SHOULDER 160X102IN 10/CA

## (undated) DEVICE — PAD DERMAPROX SM 8X11X.5IN

## (undated) DEVICE — COVER TABLE HVY DTY 60X90IN

## (undated) DEVICE — MAT SUCTION PUDDLEVAC ORANGE

## (undated) DEVICE — COVER OVERHEAD SURG LT BLUE

## (undated) DEVICE — STAPLER INT LINEAR ARTC 3.5-45

## (undated) DEVICE — KIT TOTAL KNEE TKOFG OMC

## (undated) DEVICE — SEE MEDLINE ITEM 146417

## (undated) DEVICE — ADHESIVE MASTISOL VIAL 48/BX

## (undated) DEVICE — DRESSING AQUACEL AG ADV 3.5X12

## (undated) DEVICE — SOL 9P NACL IRR PIC IL

## (undated) DEVICE — SPONGE DERMA 8PLY 2X2

## (undated) DEVICE — KIT DRAPE RIO ONE PIECE W/POCK

## (undated) DEVICE — GAUZE SPONGE 4X4 12PLY

## (undated) DEVICE — PACK BASIC

## (undated) DEVICE — CONNECTOR TUBING STR 5 IN 1

## (undated) DEVICE — DRESSING AQUACEL FOAM 4X4IN

## (undated) DEVICE — SEALER LIGASURE LAP 37CM 5MM

## (undated) DEVICE — PAD ABD 8X10 STERILE

## (undated) DEVICE — PENCIL ELECTROSURG HOLST W/BLD

## (undated) DEVICE — SUT 0 VICRYL / UR6 (J603)

## (undated) DEVICE — GOWN POLY REINF BRTH SLV XL

## (undated) DEVICE — TOURNIQUET SB QC SP 18X4IN

## (undated) DEVICE — SLING ARM COMFT NAVY BLU LG

## (undated) DEVICE — NDL 22GA X1 1/2 REG BEVEL

## (undated) DEVICE — NDL 18GA X1 1/2 REG BEVEL

## (undated) DEVICE — PAD CAST SPECIALIST STRL 3

## (undated) DEVICE — DRESSING AQUACEL SACRAL 8 X 7

## (undated) DEVICE — BLADE SCALP OPHTL BEVEL STR

## (undated) DEVICE — TROCAR ENDOPATH XCEL 5MM 7.5CM

## (undated) DEVICE — GLOVE BIOGEL SKINSENSE PI 8.0

## (undated) DEVICE — DRESSING MEDIPORE CLTH 3.5X6IN

## (undated) DEVICE — BLADE SHAVER 4.5 6/BX

## (undated) DEVICE — CORD FOR BIPOLAR FORCEPS 12

## (undated) DEVICE — DRAPE THYROID WITH ARMBOARD

## (undated) DEVICE — SUPPORT ULNA NERVE PROTECTOR

## (undated) DEVICE — BLADE MAKO STANDARD

## (undated) DEVICE — DRAPE INCISE IOBAN 2 23X33IN

## (undated) DEVICE — SPONGE GAUZE 16PLY 4X4

## (undated) DEVICE — DRAPE U SPLIT SHEET 54X76IN

## (undated) DEVICE — SUT ETHIBOND EXCEL 0 CT2 30

## (undated) DEVICE — CART STAPLE FLEX ETX 3.5MM BLU

## (undated) DEVICE — BLADE SAGITTAL 18 X 1.27 X 90M

## (undated) DEVICE — SEE MEDLINE ITEM 157216

## (undated) DEVICE — BLADE LONG 31.0MM X 9.0MM

## (undated) DEVICE — SOL IRR NACL .9% 3000ML

## (undated) DEVICE — BLADE SURG CARBON STEEL SZ11

## (undated) DEVICE — SPLINT PLASTER FAST SET 5X30IN

## (undated) DEVICE — PAD ABDOMINAL 5X9 STERILE

## (undated) DEVICE — PIN BONE 4 X 140MM STERILE
Type: IMPLANTABLE DEVICE | Site: KNEE | Status: NON-FUNCTIONAL
Removed: 2024-04-11

## (undated) DEVICE — SUT SMARTSTITCH PERFECTPASS

## (undated) DEVICE — TROCAR ENDOPATH XCEL 12X100MM

## (undated) DEVICE — CART STAPLE RELD 45MM WHT

## (undated) DEVICE — INSTRAMOD SHOULDER TRACTION

## (undated) DEVICE — DRESSING XEROFORM 1X8IN

## (undated) DEVICE — TIP SUCTION IRRIGATOR

## (undated) DEVICE — DRAPE T EXTRM SURG 121X128X90

## (undated) DEVICE — GOWN POLY REINF BRTH SLV LG

## (undated) DEVICE — SPONGE LAP 18X18 PREWASHED

## (undated) DEVICE — GLOVE SENSICARE PI GRN 8.5

## (undated) DEVICE — CONTAINER MULTIPURPOSE/SPECIME

## (undated) DEVICE — WIRE SUTURE PASSING

## (undated) DEVICE — SLING ARM X-LARGE FOAM STRAP

## (undated) DEVICE — SEE MEDLINE ITEM 157166

## (undated) DEVICE — ALCOHOL 70% ISOP W/GREEN 16OZ

## (undated) DEVICE — PIN BONE 4X110MM
Type: IMPLANTABLE DEVICE | Site: KNEE | Status: NON-FUNCTIONAL
Removed: 2024-04-11

## (undated) DEVICE — CLOSURE SKIN STERI STRIP 1/2X4

## (undated) DEVICE — NDL SPINAL 18GX3.5 SPINOCAN

## (undated) DEVICE — SYR 30CC LUER LOCK

## (undated) DEVICE — PACK UPPER EXTREMITY BAPTIST